# Patient Record
Sex: FEMALE | Race: WHITE | NOT HISPANIC OR LATINO
[De-identification: names, ages, dates, MRNs, and addresses within clinical notes are randomized per-mention and may not be internally consistent; named-entity substitution may affect disease eponyms.]

---

## 2017-01-04 ENCOUNTER — APPOINTMENT (OUTPATIENT)
Dept: INTERNAL MEDICINE | Facility: CLINIC | Age: 82
End: 2017-01-04

## 2017-06-28 ENCOUNTER — APPOINTMENT (OUTPATIENT)
Dept: INTERNAL MEDICINE | Facility: CLINIC | Age: 82
End: 2017-06-28

## 2017-07-19 ENCOUNTER — APPOINTMENT (OUTPATIENT)
Dept: INTERNAL MEDICINE | Facility: CLINIC | Age: 82
End: 2017-07-19

## 2017-08-31 ENCOUNTER — APPOINTMENT (OUTPATIENT)
Dept: INTERNAL MEDICINE | Facility: CLINIC | Age: 82
End: 2017-08-31
Payer: MEDICARE

## 2017-08-31 PROCEDURE — 99215 OFFICE O/P EST HI 40 MIN: CPT

## 2017-09-27 ENCOUNTER — APPOINTMENT (OUTPATIENT)
Dept: INTERNAL MEDICINE | Facility: CLINIC | Age: 82
End: 2017-09-27
Payer: MEDICARE

## 2017-09-27 PROCEDURE — 93000 ELECTROCARDIOGRAM COMPLETE: CPT

## 2017-09-27 PROCEDURE — 99215 OFFICE O/P EST HI 40 MIN: CPT | Mod: 25

## 2017-11-07 ENCOUNTER — APPOINTMENT (OUTPATIENT)
Dept: INTERNAL MEDICINE | Facility: CLINIC | Age: 82
End: 2017-11-07
Payer: MEDICARE

## 2017-11-07 PROCEDURE — 90686 IIV4 VACC NO PRSV 0.5 ML IM: CPT

## 2017-11-07 PROCEDURE — 96372 THER/PROPH/DIAG INJ SC/IM: CPT

## 2017-11-07 PROCEDURE — G0008: CPT

## 2017-11-07 PROCEDURE — 99214 OFFICE O/P EST MOD 30 MIN: CPT | Mod: 25

## 2017-11-15 ENCOUNTER — APPOINTMENT (OUTPATIENT)
Dept: INTERNAL MEDICINE | Facility: CLINIC | Age: 82
End: 2017-11-15
Payer: MEDICARE

## 2017-11-15 PROCEDURE — 99212 OFFICE O/P EST SF 10 MIN: CPT | Mod: 25

## 2017-11-15 PROCEDURE — 96372 THER/PROPH/DIAG INJ SC/IM: CPT

## 2017-11-22 ENCOUNTER — APPOINTMENT (OUTPATIENT)
Dept: INTERNAL MEDICINE | Facility: CLINIC | Age: 82
End: 2017-11-22
Payer: MEDICARE

## 2017-11-22 PROCEDURE — 96372 THER/PROPH/DIAG INJ SC/IM: CPT

## 2017-11-22 PROCEDURE — 99212 OFFICE O/P EST SF 10 MIN: CPT | Mod: 25

## 2017-11-29 ENCOUNTER — APPOINTMENT (OUTPATIENT)
Dept: INTERNAL MEDICINE | Facility: CLINIC | Age: 82
End: 2017-11-29
Payer: MEDICARE

## 2017-11-29 PROCEDURE — 99213 OFFICE O/P EST LOW 20 MIN: CPT | Mod: 25

## 2017-11-29 PROCEDURE — 96372 THER/PROPH/DIAG INJ SC/IM: CPT

## 2018-01-02 ENCOUNTER — APPOINTMENT (OUTPATIENT)
Dept: INTERNAL MEDICINE | Facility: CLINIC | Age: 83
End: 2018-01-02
Payer: MEDICARE

## 2018-01-02 PROCEDURE — 99213 OFFICE O/P EST LOW 20 MIN: CPT | Mod: 25

## 2018-01-02 PROCEDURE — 96372 THER/PROPH/DIAG INJ SC/IM: CPT

## 2018-02-13 ENCOUNTER — APPOINTMENT (OUTPATIENT)
Dept: INTERNAL MEDICINE | Facility: CLINIC | Age: 83
End: 2018-02-13
Payer: MEDICARE

## 2018-02-13 PROCEDURE — 99214 OFFICE O/P EST MOD 30 MIN: CPT | Mod: 25

## 2018-02-13 PROCEDURE — 96372 THER/PROPH/DIAG INJ SC/IM: CPT

## 2018-03-26 ENCOUNTER — APPOINTMENT (OUTPATIENT)
Dept: INTERNAL MEDICINE | Facility: CLINIC | Age: 83
End: 2018-03-26
Payer: MEDICARE

## 2018-03-26 PROCEDURE — 36415 COLL VENOUS BLD VENIPUNCTURE: CPT

## 2018-03-26 PROCEDURE — 96372 THER/PROPH/DIAG INJ SC/IM: CPT

## 2018-03-26 PROCEDURE — 99214 OFFICE O/P EST MOD 30 MIN: CPT | Mod: 25

## 2018-05-11 ENCOUNTER — APPOINTMENT (OUTPATIENT)
Dept: INTERNAL MEDICINE | Facility: CLINIC | Age: 83
End: 2018-05-11
Payer: MEDICARE

## 2018-05-11 PROCEDURE — 96372 THER/PROPH/DIAG INJ SC/IM: CPT

## 2018-06-18 ENCOUNTER — APPOINTMENT (OUTPATIENT)
Dept: INTERNAL MEDICINE | Facility: CLINIC | Age: 83
End: 2018-06-18
Payer: MEDICARE

## 2018-06-18 DIAGNOSIS — E66.9 OBESITY, UNSPECIFIED: ICD-10-CM

## 2018-06-18 DIAGNOSIS — Z82.49 FAMILY HISTORY OF ISCHEMIC HEART DISEASE AND OTHER DISEASES OF THE CIRCULATORY SYSTEM: ICD-10-CM

## 2018-06-18 PROCEDURE — 99213 OFFICE O/P EST LOW 20 MIN: CPT | Mod: 25

## 2018-06-18 PROCEDURE — 96372 THER/PROPH/DIAG INJ SC/IM: CPT

## 2018-07-20 PROBLEM — Z82.49 FAMILY HISTORY OF CARDIAC DISORDER: Status: ACTIVE | Noted: 2018-07-20

## 2018-07-20 PROBLEM — E66.9 OBESITY: Status: ACTIVE | Noted: 2018-07-20

## 2018-08-03 ENCOUNTER — RX RENEWAL (OUTPATIENT)
Age: 83
End: 2018-08-03

## 2018-08-13 ENCOUNTER — APPOINTMENT (OUTPATIENT)
Dept: INTERNAL MEDICINE | Facility: CLINIC | Age: 83
End: 2018-08-13

## 2018-08-20 ENCOUNTER — APPOINTMENT (OUTPATIENT)
Dept: INTERNAL MEDICINE | Facility: CLINIC | Age: 83
End: 2018-08-20
Payer: MEDICARE

## 2018-08-20 VITALS
HEART RATE: 75 BPM | SYSTOLIC BLOOD PRESSURE: 138 MMHG | RESPIRATION RATE: 16 BRPM | DIASTOLIC BLOOD PRESSURE: 76 MMHG | HEIGHT: 65 IN

## 2018-08-20 PROCEDURE — 99214 OFFICE O/P EST MOD 30 MIN: CPT

## 2018-08-20 RX ORDER — ALPRAZOLAM 0.5 MG/1
0.5 TABLET ORAL
Refills: 0 | Status: DISCONTINUED | COMMUNITY
End: 2018-08-20

## 2018-08-20 NOTE — PLAN
[FreeTextEntry1] : Patient's last episode likely secondary to anxiety. In joint discussion with myself, the patient and daughters, it was decided that the best course of Bactrim would be to increase her Zoloft from 50 mg once a day to 75 mg once a day. We will try this for one month patient advised she is having any adverse effects that she would go back to her original dosage of 50 mg once a day.\par \par The rest of patient's blood pressure was within normal limits will continue her current blood pressure medications at this time.\par \par Counseling included abnormal lab results, differential diagnoses, treatment options, risks and benefits, lifestyle changes, prognosis, current condition, medications, and dose adjustments. \par The patient was interactive, attentive, asked questions, and verbalized understanding

## 2018-08-20 NOTE — HISTORY OF PRESENT ILLNESS
[Family Member] : family member [FreeTextEntry8] : Isabella is a 83-year-old female past medical history of high blood pressure, dementia, asthma hyperlipidemia, who comes to the office after going to the hospital on the EKG of August 18 "not feeling right" while shopping with her family. Patient was found to have elevated blood pressures at the hospital with the last blood pressure reading being 180's oer 70's. In the office today patient blood pressure reading was 138/76. Patient started to have bedside and her sodium information the patient is unable to provide. It appears that the patient was under a lot of stress and is experiencing a lot of anxiety at the time of his blood pressure. There is also issue of the patient not compliant with eating well taking her medications for dementia. Patient feels well and has no complaints at this time

## 2018-08-20 NOTE — PHYSICAL EXAM
[No Acute Distress] : no acute distress [Well Nourished] : well nourished [Well Developed] : well developed [Well-Appearing] : well-appearing [No JVD] : no jugular venous distention [Supple] : supple [No Respiratory Distress] : no respiratory distress  [Clear to Auscultation] : lungs were clear to auscultation bilaterally [No Accessory Muscle Use] : no accessory muscle use [Normal Rate] : normal rate  [Regular Rhythm] : with a regular rhythm [Normal S1, S2] : normal S1 and S2 [Soft] : abdomen soft [Non Tender] : non-tender [Non-distended] : non-distended [No CVA Tenderness] : no CVA  tenderness [No Spinal Tenderness] : no spinal tenderness [Coordination Grossly Intact] : coordination grossly intact [No Focal Deficits] : no focal deficits [de-identified] : 6cm x 6cm lipoma noted on anterior neck

## 2018-09-17 ENCOUNTER — APPOINTMENT (OUTPATIENT)
Dept: INTERNAL MEDICINE | Facility: CLINIC | Age: 83
End: 2018-09-17
Payer: MEDICARE

## 2018-09-17 PROCEDURE — 96372 THER/PROPH/DIAG INJ SC/IM: CPT

## 2018-09-17 PROCEDURE — G0008: CPT

## 2018-09-17 PROCEDURE — 99215 OFFICE O/P EST HI 40 MIN: CPT | Mod: 25

## 2018-09-17 PROCEDURE — 90662 IIV NO PRSV INCREASED AG IM: CPT

## 2018-09-17 RX ORDER — CYANOCOBALAMIN 1000 UG/ML
1000 INJECTION INTRAMUSCULAR; SUBCUTANEOUS
Qty: 0 | Refills: 0 | Status: COMPLETED | OUTPATIENT
Start: 2018-09-17

## 2018-09-17 RX ADMIN — CYANOCOBALAMIN 0 MCG/ML: 1000 INJECTION INTRAMUSCULAR; SUBCUTANEOUS at 00:00

## 2018-09-17 NOTE — PLAN
[FreeTextEntry1] : PT HERE WITH HER DAUGHTER \par PT HAS WON DECLINE SINCE I LAST SAW HER IN JUNE PT HAD ER VISIT IN AUGUST FOR SHAKINESS AND BP WAS UP\par PT WITH SLOW BROAD BASED GAIT IS ON ARICEPT AND NAMENDA FROM NEUROLOGIST\par I GAVE HER B12 SHOT TODAY\par HER MEMORY HAS DECLINED \par I AM NOT CONVINCED THE SHAKINESS IS TREMORS MORE LIKELY ANXIETY PT FEEL SIT IS MED RELATED SHE CAN TAPER OFF THE TWO MEDS BUT SUGGEST THAT HER DAUGHTER CONTACT HER NEUROLOGIST  ABOUT THIS ALSO THE GAIT IS CONCERNING BUT DAUGHTER STATES SHE HAS HAD WORK UP FOR THIS\par FLUZONE HIGH DOES GIVEN B12 SHOT GIVEN WILL FOLLOW UP IN ONE MONTH

## 2018-09-17 NOTE — HISTORY OF PRESENT ILLNESS
[FreeTextEntry1] : PT HERE WITH DAUGHTER DIFFICULTY WALKING [de-identified] : PT HERE WITH DAUGHTER SEVERAL ISSUES UNFORTUNATELY AT THE TIME PT WAS SEEN THE COMPUTER EMR SYSTEM WAS DOWN IN THE OFFICE PT DAUGHTER REPORTS ER VISIT TO [facility] AND PT WITH ANXIETY ALSO WITH MEMORY ISSUES AND HAS SEEN NEUROLOGIST IN [place] DR BRITO  PT DAUGHTER CONCERNED ABOUT HOW SHE WALKS

## 2018-09-17 NOTE — PHYSICAL EXAM
[No Acute Distress] : no acute distress [Well Nourished] : well nourished [Well Developed] : well developed [Well-Appearing] : well-appearing [Normal Sclera/Conjunctiva] : normal sclera/conjunctiva [PERRL] : pupils equal round and reactive to light [EOMI] : extraocular movements intact [Normal Outer Ear/Nose] : the outer ears and nose were normal in appearance [Normal Oropharynx] : the oropharynx was normal [No JVD] : no jugular venous distention [Supple] : supple [No Lymphadenopathy] : no lymphadenopathy [Thyroid Normal, No Nodules] : the thyroid was normal and there were no nodules present [No Respiratory Distress] : no respiratory distress  [Clear to Auscultation] : lungs were clear to auscultation bilaterally [No Accessory Muscle Use] : no accessory muscle use [Normal Rate] : normal rate  [Regular Rhythm] : with a regular rhythm [Normal S1, S2] : normal S1 and S2 [No Murmur] : no murmur heard [No Carotid Bruits] : no carotid bruits [No Abdominal Bruit] : a ~M bruit was not heard ~T in the abdomen [No Varicosities] : no varicosities [Pedal Pulses Present] : the pedal pulses are present [No Edema] : there was no peripheral edema [No Extremity Clubbing/Cyanosis] : no extremity clubbing/cyanosis [No Palpable Aorta] : no palpable aorta [Soft] : abdomen soft [Non Tender] : non-tender [Non-distended] : non-distended [No Masses] : no abdominal mass palpated [No HSM] : no HSM [Normal Bowel Sounds] : normal bowel sounds [Normal Posterior Cervical Nodes] : no posterior cervical lymphadenopathy [Normal Anterior Cervical Nodes] : no anterior cervical lymphadenopathy [No CVA Tenderness] : no CVA  tenderness [No Spinal Tenderness] : no spinal tenderness [No Joint Swelling] : no joint swelling [No Rash] : no rash [Normal Affect] : the affect was normal [Normal Insight/Judgement] : insight and judgment were intact [de-identified] : BROAD BASED GAIT SLOW  TREMORS

## 2018-09-17 NOTE — HEALTH RISK ASSESSMENT
[One fall no injury in past year] : Patient reported one fall in the past year without injury [] : No

## 2018-10-11 ENCOUNTER — RX RENEWAL (OUTPATIENT)
Age: 83
End: 2018-10-11

## 2018-10-12 ENCOUNTER — MEDICATION RENEWAL (OUTPATIENT)
Age: 83
End: 2018-10-12

## 2018-10-23 ENCOUNTER — APPOINTMENT (OUTPATIENT)
Dept: INTERNAL MEDICINE | Facility: CLINIC | Age: 83
End: 2018-10-23
Payer: MEDICARE

## 2018-10-23 VITALS
DIASTOLIC BLOOD PRESSURE: 80 MMHG | BODY MASS INDEX: 26.33 KG/M2 | SYSTOLIC BLOOD PRESSURE: 155 MMHG | HEIGHT: 65 IN | WEIGHT: 158 LBS

## 2018-10-23 DIAGNOSIS — J30.9 ALLERGIC RHINITIS, UNSPECIFIED: ICD-10-CM

## 2018-10-23 DIAGNOSIS — I35.0 NONRHEUMATIC AORTIC (VALVE) STENOSIS: ICD-10-CM

## 2018-10-23 PROCEDURE — 99214 OFFICE O/P EST MOD 30 MIN: CPT

## 2018-10-23 NOTE — HISTORY OF PRESENT ILLNESS
[FreeTextEntry1] : Pt Complain of Runny Nose [de-identified] : [facility]\par Pt here with daughter she is concerned about the patients  worsening dementia seen by neurologist  and seeing neuro orthopedic [facility]\par pt now on carbidopa for ? parkinson

## 2018-10-23 NOTE — PHYSICAL EXAM

## 2018-10-23 NOTE — PLAN
[FreeTextEntry1] : Patient is here for followup feels ok but with decline in memory and with tremors\par told daughter follow up with neurology\par overall feels ok\par long d/w daughter

## 2018-10-23 NOTE — PHYSICAL EXAM

## 2019-01-09 ENCOUNTER — APPOINTMENT (OUTPATIENT)
Dept: INTERNAL MEDICINE | Facility: CLINIC | Age: 84
End: 2019-01-09
Payer: MEDICARE

## 2019-01-09 VITALS
SYSTOLIC BLOOD PRESSURE: 121 MMHG | WEIGHT: 158 LBS | HEIGHT: 65 IN | DIASTOLIC BLOOD PRESSURE: 78 MMHG | BODY MASS INDEX: 26.33 KG/M2

## 2019-01-09 PROCEDURE — 99215 OFFICE O/P EST HI 40 MIN: CPT | Mod: 25

## 2019-01-09 PROCEDURE — 36415 COLL VENOUS BLD VENIPUNCTURE: CPT

## 2019-01-09 RX ORDER — TRIAMTERENE AND HYDROCHLOROTHIAZIDE 37.5; 25 MG/1; MG/1
37.5-25 CAPSULE ORAL
Refills: 0 | Status: DISCONTINUED | COMMUNITY
End: 2019-01-09

## 2019-01-09 NOTE — PLAN
[FreeTextEntry1] : Patient is here for followup feels ok but with decline in memory and with tremors\par told daughter follow up with neurology\par who she saw a dr miner and he has a follow up with optho they are concerned about parkinsonism\par overall feels ok\par

## 2019-01-09 NOTE — HISTORY OF PRESENT ILLNESS
[FreeTextEntry1] : meds [de-identified] :  Patient is a 83-year-old patient  who prior entering the exam room gave me a note about his mother\par

## 2019-01-13 LAB
25(OH)D3 SERPL-MCNC: 25.7 NG/ML
ALBUMIN SERPL ELPH-MCNC: 4.7 G/DL
ALP BLD-CCNC: 110 U/L
ALT SERPL-CCNC: 16 U/L
ANION GAP SERPL CALC-SCNC: 12 MMOL/L
AST SERPL-CCNC: 17 U/L
BASOPHILS # BLD AUTO: 0.05 K/UL
BASOPHILS NFR BLD AUTO: 0.7 %
BILIRUB SERPL-MCNC: 0.9 MG/DL
BUN SERPL-MCNC: 23 MG/DL
CALCIUM SERPL-MCNC: 10.5 MG/DL
CHLORIDE SERPL-SCNC: 108 MMOL/L
CHOLEST SERPL-MCNC: 213 MG/DL
CHOLEST/HDLC SERPL: 3.4 RATIO
CO2 SERPL-SCNC: 22 MMOL/L
CREAT SERPL-MCNC: 1.1 MG/DL
EOSINOPHIL # BLD AUTO: 0.06 K/UL
EOSINOPHIL NFR BLD AUTO: 0.9 %
GLUCOSE SERPL-MCNC: 122 MG/DL
HBA1C MFR BLD HPLC: 5.3 %
HCT VFR BLD CALC: 43.6 %
HDLC SERPL-MCNC: 63 MG/DL
HGB BLD-MCNC: 14.5 G/DL
IMM GRANULOCYTES NFR BLD AUTO: 0.1 %
LDLC SERPL CALC-MCNC: 110 MG/DL
LYMPHOCYTES # BLD AUTO: 1.72 K/UL
LYMPHOCYTES NFR BLD AUTO: 25.3 %
MAN DIFF?: NORMAL
MCHC RBC-ENTMCNC: 29.2 PG
MCHC RBC-ENTMCNC: 33.3 GM/DL
MCV RBC AUTO: 87.7 FL
MONOCYTES # BLD AUTO: 0.67 K/UL
MONOCYTES NFR BLD AUTO: 9.9 %
NEUTROPHILS # BLD AUTO: 4.28 K/UL
NEUTROPHILS NFR BLD AUTO: 63.1 %
PLATELET # BLD AUTO: 168 K/UL
POTASSIUM SERPL-SCNC: 4.2 MMOL/L
PROT SERPL-MCNC: 6.7 G/DL
RBC # BLD: 4.97 M/UL
RBC # FLD: 13.7 %
SODIUM SERPL-SCNC: 142 MMOL/L
T4 SERPL-MCNC: 5.2 UG/DL
TRIGL SERPL-MCNC: 199 MG/DL
TSH SERPL-ACNC: 3.58 UIU/ML
VIT B12 SERPL-MCNC: 666 PG/ML
WBC # FLD AUTO: 6.79 K/UL

## 2019-03-13 ENCOUNTER — RX RENEWAL (OUTPATIENT)
Age: 84
End: 2019-03-13

## 2019-03-13 ENCOUNTER — APPOINTMENT (OUTPATIENT)
Dept: INTERNAL MEDICINE | Facility: CLINIC | Age: 84
End: 2019-03-13
Payer: MEDICARE

## 2019-03-13 VITALS
SYSTOLIC BLOOD PRESSURE: 140 MMHG | WEIGHT: 154.4 LBS | DIASTOLIC BLOOD PRESSURE: 70 MMHG | BODY MASS INDEX: 25.72 KG/M2 | HEIGHT: 65 IN

## 2019-03-13 PROCEDURE — 99214 OFFICE O/P EST MOD 30 MIN: CPT

## 2019-03-14 NOTE — HISTORY OF PRESENT ILLNESS
[FreeTextEntry1] : F/U  on  B/P [de-identified] :  Patient is a 83-year-old patient  who IS  HERE WITH DAUGHTER  \par HAS SEEN NERUOLOGY PT WITH COGNITVE CHANGES MEMORY LOSS PT WITH BACK PAIN SPINAL STENOSIS\par

## 2019-03-14 NOTE — PLAN
[FreeTextEntry1] : Patient is here for followup feels ok but with decline in memory and with tremors\par tPT HAS FOLLOWED WITH  NEUROLOGY ON ARICEPT AND MEMANTINE \par who she saw a dr miner and he has a follow up with optho they are concerned about parkinsonism\par AND ON LEVODOPA CABIDOPA \par LONG D/W DAUGHTER  AND PT ENCOURAGE DHER TO MOVE TO INDEPENDENT LIVING  AS WOULD BE BETTER  MENTALLY \par

## 2019-03-15 ENCOUNTER — RX RENEWAL (OUTPATIENT)
Age: 84
End: 2019-03-15

## 2019-04-24 ENCOUNTER — APPOINTMENT (OUTPATIENT)
Dept: INTERNAL MEDICINE | Facility: CLINIC | Age: 84
End: 2019-04-24
Payer: MEDICARE

## 2019-04-24 VITALS
HEIGHT: 65 IN | DIASTOLIC BLOOD PRESSURE: 70 MMHG | SYSTOLIC BLOOD PRESSURE: 130 MMHG | WEIGHT: 153 LBS | BODY MASS INDEX: 25.49 KG/M2

## 2019-04-24 PROCEDURE — 99358 PROLONG SERVICE W/O CONTACT: CPT

## 2019-04-24 PROCEDURE — 99214 OFFICE O/P EST MOD 30 MIN: CPT | Mod: 25

## 2019-04-24 NOTE — PLAN
[FreeTextEntry1] : PT HERE FOR FOLLOW UP WITH DAUGHTER \par  PT WITH FALL AT HOME  WITH LARGE  ECCYMOSIS OVER  FACE\par PT TOLD  I BELIEVE SHE NEEDS ASSISTANCE AT HOME AND THAT WHE WAS  SALLY THIS TIME \par SHE SEEMS AGREEABLE \par WILL FOLLOW UP \par SHE DOES HABVE A MED ALERT [facility]\par WILL FOLLOW UP\par EXTENSIVE REVIEW OF HOSPITAL RECORDS\par DURATION OF REVIEW 35 MINUTES

## 2019-04-24 NOTE — PHYSICAL EXAM
[No Acute Distress] : no acute distress [Well Nourished] : well nourished [Well Developed] : well developed [Well-Appearing] : well-appearing [PERRL] : pupils equal round and reactive to light [EOMI] : extraocular movements intact [Normal Outer Ear/Nose] : the outer ears and nose were normal in appearance [Normal Oropharynx] : the oropharynx was normal [No JVD] : no jugular venous distention [Supple] : supple [No Lymphadenopathy] : no lymphadenopathy [Thyroid Normal, No Nodules] : the thyroid was normal and there were no nodules present [No Respiratory Distress] : no respiratory distress  [Clear to Auscultation] : lungs were clear to auscultation bilaterally [No Accessory Muscle Use] : no accessory muscle use [Normal Rate] : normal rate  [Regular Rhythm] : with a regular rhythm [Normal S1, S2] : normal S1 and S2 [No Murmur] : no murmur heard [No Carotid Bruits] : no carotid bruits [No Abdominal Bruit] : a ~M bruit was not heard ~T in the abdomen [No Varicosities] : no varicosities [Pedal Pulses Present] : the pedal pulses are present [No Edema] : there was no peripheral edema [No Extremity Clubbing/Cyanosis] : no extremity clubbing/cyanosis [No Palpable Aorta] : no palpable aorta [Soft] : abdomen soft [Non Tender] : non-tender [Non-distended] : non-distended [No Masses] : no abdominal mass palpated [No HSM] : no HSM [Normal Bowel Sounds] : normal bowel sounds [Normal Posterior Cervical Nodes] : no posterior cervical lymphadenopathy [Normal Anterior Cervical Nodes] : no anterior cervical lymphadenopathy [No CVA Tenderness] : no CVA  tenderness [No Spinal Tenderness] : no spinal tenderness [No Joint Swelling] : no joint swelling [Grossly Normal Strength/Tone] : grossly normal strength/tone [No Rash] : no rash [Normal Gait] : normal gait [Coordination Grossly Intact] : coordination grossly intact [No Focal Deficits] : no focal deficits [Deep Tendon Reflexes (DTR)] : deep tendon reflexes were 2+ and symmetric [Normal Affect] : the affect was normal [Normal Insight/Judgement] : insight and judgment were intact [de-identified] : ECCYMOSIS OVER ENTIRE FACE

## 2019-04-24 NOTE — HISTORY OF PRESENT ILLNESS
[FreeTextEntry8] : F/U  on  ER  Visit \par PT WITH RECENT FALL AT HOME WENT TO [facility] AND HAS LARGE AREA OF ECYMOSIS\par DAUGHTER WANTS PT TO HAVE HOME ATTENDANT BUT SO FAR PT HAS REFUSED \par

## 2019-04-24 NOTE — REVIEW OF SYSTEMS
[Negative] : Heme/Lymph [Confusion] : confusion [Memory Loss] : memory loss [Unsteady Walking] : ataxia

## 2019-04-30 ENCOUNTER — RX RENEWAL (OUTPATIENT)
Age: 84
End: 2019-04-30

## 2019-06-21 ENCOUNTER — APPOINTMENT (OUTPATIENT)
Dept: INTERNAL MEDICINE | Facility: CLINIC | Age: 84
End: 2019-06-21

## 2019-08-29 ENCOUNTER — APPOINTMENT (OUTPATIENT)
Dept: INTERNAL MEDICINE | Facility: CLINIC | Age: 84
End: 2019-08-29
Payer: MEDICARE

## 2019-08-29 VITALS
BODY MASS INDEX: 25.49 KG/M2 | WEIGHT: 153 LBS | DIASTOLIC BLOOD PRESSURE: 60 MMHG | SYSTOLIC BLOOD PRESSURE: 118 MMHG | HEIGHT: 65 IN

## 2019-08-29 DIAGNOSIS — F32.9 MAJOR DEPRESSIVE DISORDER, SINGLE EPISODE, UNSPECIFIED: ICD-10-CM

## 2019-08-29 DIAGNOSIS — Z00.00 ENCOUNTER FOR GENERAL ADULT MEDICAL EXAMINATION W/OUT ABNORMAL FINDINGS: ICD-10-CM

## 2019-08-29 DIAGNOSIS — R01.1 CARDIAC MURMUR, UNSPECIFIED: ICD-10-CM

## 2019-08-29 DIAGNOSIS — E78.00 PURE HYPERCHOLESTEROLEMIA, UNSPECIFIED: ICD-10-CM

## 2019-08-29 DIAGNOSIS — E55.9 VITAMIN D DEFICIENCY, UNSPECIFIED: ICD-10-CM

## 2019-08-29 DIAGNOSIS — M16.11 UNILATERAL PRIMARY OSTEOARTHRITIS, RIGHT HIP: ICD-10-CM
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
## 2019-08-29 DIAGNOSIS — R73.03 PREDIABETES.: ICD-10-CM

## 2019-08-29 DIAGNOSIS — Z09 ENCOUNTER FOR FOLLOW-UP EXAMINATION AFTER COMPLETED TREATMENT FOR CONDITIONS OTHER THAN MALIGNANT NEOPLASM: ICD-10-CM

## 2019-08-29 DIAGNOSIS — E53.8 DEFICIENCY OF OTHER SPECIFIED B GROUP VITAMINS: ICD-10-CM

## 2019-08-29 DIAGNOSIS — R19.7 DIARRHEA, UNSPECIFIED: ICD-10-CM

## 2019-08-29 PROCEDURE — 36415 COLL VENOUS BLD VENIPUNCTURE: CPT

## 2019-08-29 PROCEDURE — 99214 OFFICE O/P EST MOD 30 MIN: CPT | Mod: 25

## 2019-08-29 NOTE — ASSESSMENT
[FreeTextEntry1] : 83 YO MALE WITH HX OF DEMENITA PARKINSONISM HTN HERE FOR FOLLOWUP WITH DAUGHTER HAS HAD SOME FALLS  IS GOING TO PT\par WILL CHECK LABS DRAWN HERE\par CONTINUE PRESENT REGIMEN

## 2019-08-29 NOTE — HISTORY OF PRESENT ILLNESS
[FreeTextEntry1] : Follow Up On B/P  &  Other Medical Problems [de-identified] : PT HERE FOR FOLLOW UP FEEL DANIS HAS HAD SOME FALLS AT HOME SOINCE LAST VISIT AND HOSP ER VISIT ON JUNE 21 [facility] RECEIVING PT AT [facility]\par PT WITH WORSENING DEMENTIA AS   PER DAUGHTER

## 2019-08-29 NOTE — PHYSICAL EXAM
[Well Nourished] : well nourished [No Acute Distress] : no acute distress [Well-Appearing] : well-appearing [Well Developed] : well developed [Normal Sclera/Conjunctiva] : normal sclera/conjunctiva [PERRL] : pupils equal round and reactive to light [EOMI] : extraocular movements intact [Normal Outer Ear/Nose] : the outer ears and nose were normal in appearance [Normal Oropharynx] : the oropharynx was normal [No JVD] : no jugular venous distention [No Lymphadenopathy] : no lymphadenopathy [Thyroid Normal, No Nodules] : the thyroid was normal and there were no nodules present [Supple] : supple [No Accessory Muscle Use] : no accessory muscle use [No Respiratory Distress] : no respiratory distress  [Normal Rate] : normal rate  [Clear to Auscultation] : lungs were clear to auscultation bilaterally [Regular Rhythm] : with a regular rhythm [Normal S1, S2] : normal S1 and S2 [No Murmur] : no murmur heard [No Abdominal Bruit] : a ~M bruit was not heard ~T in the abdomen [No Carotid Bruits] : no carotid bruits [Pedal Pulses Present] : the pedal pulses are present [No Varicosities] : no varicosities [No Edema] : there was no peripheral edema [No Palpable Aorta] : no palpable aorta [Soft] : abdomen soft [No Extremity Clubbing/Cyanosis] : no extremity clubbing/cyanosis [Non Tender] : non-tender [No Masses] : no abdominal mass palpated [Non-distended] : non-distended [No HSM] : no HSM [Normal Bowel Sounds] : normal bowel sounds [Normal Posterior Cervical Nodes] : no posterior cervical lymphadenopathy [Normal Anterior Cervical Nodes] : no anterior cervical lymphadenopathy [No CVA Tenderness] : no CVA  tenderness [No Joint Swelling] : no joint swelling [No Spinal Tenderness] : no spinal tenderness [Grossly Normal Strength/Tone] : grossly normal strength/tone [No Rash] : no rash [Normal] : normal gait, coordination grossly intact, no focal deficits and deep tendon reflexes were 2+ and symmetric [Normal Affect] : the affect was normal [Normal Insight/Judgement] : insight and judgment were intact

## 2019-09-25 ENCOUNTER — RX RENEWAL (OUTPATIENT)
Age: 84
End: 2019-09-25

## 2019-09-29 LAB
25(OH)D3 SERPL-MCNC: 50.1 NG/ML
ALBUMIN SERPL ELPH-MCNC: 4.5 G/DL
ALP BLD-CCNC: 105 U/L
ALT SERPL-CCNC: 7 U/L
ANION GAP SERPL CALC-SCNC: 13 MMOL/L
AST SERPL-CCNC: 17 U/L
BASOPHILS # BLD AUTO: 0.07 K/UL
BASOPHILS NFR BLD AUTO: 1.1 %
BILIRUB SERPL-MCNC: 1 MG/DL
BUN SERPL-MCNC: 29 MG/DL
CALCIUM SERPL-MCNC: 10.5 MG/DL
CHLORIDE SERPL-SCNC: 101 MMOL/L
CHOLEST SERPL-MCNC: 185 MG/DL
CHOLEST/HDLC SERPL: 3.6 RATIO
CO2 SERPL-SCNC: 26 MMOL/L
CREAT SERPL-MCNC: 1.07 MG/DL
EOSINOPHIL # BLD AUTO: 0.03 K/UL
EOSINOPHIL NFR BLD AUTO: 0.5 %
ESTIMATED AVERAGE GLUCOSE: 91 MG/DL
GLUCOSE SERPL-MCNC: 123 MG/DL
HBA1C MFR BLD HPLC: 4.8 %
HCT VFR BLD CALC: 43.1 %
HDLC SERPL-MCNC: 52 MG/DL
HGB BLD-MCNC: 14 G/DL
IMM GRANULOCYTES NFR BLD AUTO: 0.2 %
LDLC SERPL CALC-MCNC: 109 MG/DL
LYMPHOCYTES # BLD AUTO: 1.57 K/UL
LYMPHOCYTES NFR BLD AUTO: 25.4 %
MAN DIFF?: NORMAL
MCHC RBC-ENTMCNC: 29.6 PG
MCHC RBC-ENTMCNC: 32.5 GM/DL
MCV RBC AUTO: 91.1 FL
MONOCYTES # BLD AUTO: 0.51 K/UL
MONOCYTES NFR BLD AUTO: 8.3 %
NEUTROPHILS # BLD AUTO: 3.98 K/UL
NEUTROPHILS NFR BLD AUTO: 64.5 %
PLATELET # BLD AUTO: 148 K/UL
POTASSIUM SERPL-SCNC: 4.5 MMOL/L
PROT SERPL-MCNC: 6.7 G/DL
RBC # BLD: 4.73 M/UL
RBC # FLD: 13.4 %
SODIUM SERPL-SCNC: 140 MMOL/L
T4 SERPL-MCNC: 5.4 UG/DL
TRIGL SERPL-MCNC: 118 MG/DL
TSH SERPL-ACNC: 1.71 UIU/ML
WBC # FLD AUTO: 6.17 K/UL

## 2019-10-23 ENCOUNTER — APPOINTMENT (OUTPATIENT)
Dept: INTERNAL MEDICINE | Facility: CLINIC | Age: 84
End: 2019-10-23

## 2020-01-09 ENCOUNTER — RX RENEWAL (OUTPATIENT)
Age: 85
End: 2020-01-09

## 2020-06-03 ENCOUNTER — APPOINTMENT (OUTPATIENT)
Dept: INTERNAL MEDICINE | Facility: CLINIC | Age: 85
End: 2020-06-03
Payer: MEDICARE

## 2020-06-03 VITALS
BODY MASS INDEX: 27.32 KG/M2 | DIASTOLIC BLOOD PRESSURE: 50 MMHG | WEIGHT: 164 LBS | HEIGHT: 65 IN | TEMPERATURE: 98 F | SYSTOLIC BLOOD PRESSURE: 150 MMHG

## 2020-06-03 DIAGNOSIS — E78.5 HYPERLIPIDEMIA, UNSPECIFIED: ICD-10-CM

## 2020-06-03 DIAGNOSIS — F41.9 ANXIETY DISORDER, UNSPECIFIED: ICD-10-CM

## 2020-06-03 DIAGNOSIS — I35.0 NONRHEUMATIC AORTIC (VALVE) STENOSIS: ICD-10-CM

## 2020-06-03 DIAGNOSIS — I10 ESSENTIAL (PRIMARY) HYPERTENSION: ICD-10-CM

## 2020-06-03 DIAGNOSIS — F03.90 UNSPECIFIED DEMENTIA W/OUT BEHAVIORAL DISTURBANCE: ICD-10-CM

## 2020-06-03 DIAGNOSIS — G20 PARKINSON'S DISEASE: ICD-10-CM

## 2020-06-03 DIAGNOSIS — R26.89 OTHER ABNORMALITIES OF GAIT AND MOBILITY: ICD-10-CM

## 2020-06-03 PROCEDURE — 99358 PROLONG SERVICE W/O CONTACT: CPT

## 2020-06-03 PROCEDURE — 99214 OFFICE O/P EST MOD 30 MIN: CPT | Mod: 25

## 2020-06-03 RX ORDER — CYANOCOBALAMIN 1000 UG/ML
1000 INJECTION INTRAMUSCULAR; SUBCUTANEOUS
Refills: 0 | Status: DISCONTINUED | COMMUNITY
End: 2020-06-03

## 2020-06-03 RX ORDER — METOPROLOL SUCCINATE 100 MG/1
100 TABLET, EXTENDED RELEASE ORAL
Qty: 90 | Refills: 2 | Status: DISCONTINUED | COMMUNITY
Start: 2018-10-11 | End: 2020-06-03

## 2020-06-03 RX ORDER — IPRATROPIUM BROMIDE 21 UG/1
0.03 SPRAY NASAL TWICE DAILY
Qty: 1 | Refills: 3 | Status: DISCONTINUED | COMMUNITY
Start: 2018-10-23 | End: 2020-06-03

## 2020-06-03 NOTE — PHYSICAL EXAM
[No Acute Distress] : no acute distress [Well-Appearing] : well-appearing [Well Nourished] : well nourished [Well Developed] : well developed [EOMI] : extraocular movements intact [Normal Sclera/Conjunctiva] : normal sclera/conjunctiva [PERRL] : pupils equal round and reactive to light [No JVD] : no jugular venous distention [Normal Outer Ear/Nose] : the outer ears and nose were normal in appearance [Normal Oropharynx] : the oropharynx was normal [Supple] : supple [No Lymphadenopathy] : no lymphadenopathy [Thyroid Normal, No Nodules] : the thyroid was normal and there were no nodules present [No Respiratory Distress] : no respiratory distress  [No Accessory Muscle Use] : no accessory muscle use [Normal Rate] : normal rate  [Clear to Auscultation] : lungs were clear to auscultation bilaterally [Regular Rhythm] : with a regular rhythm [No Carotid Bruits] : no carotid bruits [No Murmur] : no murmur heard [Normal S1, S2] : normal S1 and S2 [Pedal Pulses Present] : the pedal pulses are present [No Abdominal Bruit] : a ~M bruit was not heard ~T in the abdomen [No Varicosities] : no varicosities [No Palpable Aorta] : no palpable aorta [No Edema] : there was no peripheral edema [No Extremity Clubbing/Cyanosis] : no extremity clubbing/cyanosis [Non-distended] : non-distended [Soft] : abdomen soft [Non Tender] : non-tender [No HSM] : no HSM [No Masses] : no abdominal mass palpated [Normal Bowel Sounds] : normal bowel sounds [Normal Posterior Cervical Nodes] : no posterior cervical lymphadenopathy [No Spinal Tenderness] : no spinal tenderness [Normal Anterior Cervical Nodes] : no anterior cervical lymphadenopathy [No CVA Tenderness] : no CVA  tenderness [No Joint Swelling] : no joint swelling [Grossly Normal Strength/Tone] : grossly normal strength/tone [Normal] : normal gait, coordination grossly intact, no focal deficits and deep tendon reflexes were 2+ and symmetric [Normal Affect] : the affect was normal [Normal Insight/Judgement] : insight and judgment were intact [de-identified] : RIGHT GROIN/ THIGH HEMATOMA

## 2020-06-03 NOTE — HISTORY OF PRESENT ILLNESS
[FreeTextEntry1] : hosp f/up [de-identified] : PT HERE WITH TWO DAUGHTERS WAS HOSPITALIZED AT [facility] HAD FEVER AND CHILLS AND HAD UTI AND FOUND TO HAVE AORTIC STENOSIS  HAD CARDIAC CATH AND THERE WAS CONSIDERATION FOR TAVR ALSO WITH \par   REPORTED CLOT IN ILIAC ARTERY \par  SEEN BY VACULAR DR RASMUSSEN NO BLOOD  THINNER \par  NOW OFF METOPROLOL\par  PT WITH DEMENTIA

## 2020-06-03 NOTE — PLAN
[FreeTextEntry1] : PT HERE WITH TWO DAUGHTERS WAS HOSPITALIZED AT [facility] HAD FEVER AND CHILLS AND HAD UTI AND FOUND TO HAVE AORTIC STENOSIS  HAD CARDIAC CATH AND THERE WAS CONSIDERATION FOR TAVR ALSO WITH \par   REPORTED CLOT IN ILIAC ARTERY \par  SEEN BY VACULAR DR RASMUSSEN NO BLOOD  THINNER \par  NOW OFF METOPROLOL\par  PT WITH DEMENTIA   ANSWERS QUESTIONS BUT CONFUSED\par WILL FOLLOW UP WITH DR RASMUSSEN FOR VASC\par HAS APPT WITH DR GARCIA CARDIOLOGIST TO D/W FAMILY TAVR\par \par  EXTENSIVE REVIEW OF HOSP RECORDS INCLING RADIOLOGY RPROTS LABS CONSULTS\par DURATION OF REVIEW 35 MINUTES

## 2020-08-29 ENCOUNTER — RX RENEWAL (OUTPATIENT)
Age: 85
End: 2020-08-29

## 2020-10-02 ENCOUNTER — INPATIENT (INPATIENT)
Facility: HOSPITAL | Age: 85
LOS: 6 days | Discharge: NURSING, REHAB OR LONG-TERM CARE | DRG: 522 | End: 2020-10-09
Attending: HOSPITALIST | Admitting: SURGERY
Payer: MEDICARE

## 2020-10-02 VITALS
SYSTOLIC BLOOD PRESSURE: 143 MMHG | TEMPERATURE: 98 F | HEART RATE: 65 BPM | HEIGHT: 66 IN | RESPIRATION RATE: 18 BRPM | WEIGHT: 149.91 LBS | OXYGEN SATURATION: 94 % | DIASTOLIC BLOOD PRESSURE: 64 MMHG

## 2020-10-02 LAB
ANION GAP SERPL CALC-SCNC: 11 MMOL/L — SIGNIFICANT CHANGE UP (ref 5–17)
APTT BLD: 29.1 SEC — SIGNIFICANT CHANGE UP (ref 27.5–35.5)
BASOPHILS # BLD AUTO: 0.04 K/UL — SIGNIFICANT CHANGE UP (ref 0–0.2)
BASOPHILS NFR BLD AUTO: 0.4 % — SIGNIFICANT CHANGE UP (ref 0–2)
BLD GP AB SCN SERPL QL: SIGNIFICANT CHANGE UP
BUN SERPL-MCNC: 22 MG/DL — HIGH (ref 8–20)
CALCIUM SERPL-MCNC: 10 MG/DL — SIGNIFICANT CHANGE UP (ref 8.6–10.2)
CHLORIDE SERPL-SCNC: 104 MMOL/L — SIGNIFICANT CHANGE UP (ref 98–107)
CO2 SERPL-SCNC: 23 MMOL/L — SIGNIFICANT CHANGE UP (ref 22–29)
CREAT SERPL-MCNC: 0.91 MG/DL — SIGNIFICANT CHANGE UP (ref 0.5–1.3)
EOSINOPHIL # BLD AUTO: 0.08 K/UL — SIGNIFICANT CHANGE UP (ref 0–0.5)
EOSINOPHIL NFR BLD AUTO: 0.9 % — SIGNIFICANT CHANGE UP (ref 0–6)
GLUCOSE SERPL-MCNC: 130 MG/DL — HIGH (ref 70–99)
HCT VFR BLD CALC: 39.5 % — SIGNIFICANT CHANGE UP (ref 34.5–45)
HGB BLD-MCNC: 13.5 G/DL — SIGNIFICANT CHANGE UP (ref 11.5–15.5)
IMM GRANULOCYTES NFR BLD AUTO: 0.3 % — SIGNIFICANT CHANGE UP (ref 0–1.5)
INR BLD: 1.15 RATIO — SIGNIFICANT CHANGE UP (ref 0.88–1.16)
LYMPHOCYTES # BLD AUTO: 0.67 K/UL — LOW (ref 1–3.3)
LYMPHOCYTES # BLD AUTO: 7.3 % — LOW (ref 13–44)
MCHC RBC-ENTMCNC: 29.9 PG — SIGNIFICANT CHANGE UP (ref 27–34)
MCHC RBC-ENTMCNC: 34.2 GM/DL — SIGNIFICANT CHANGE UP (ref 32–36)
MCV RBC AUTO: 87.6 FL — SIGNIFICANT CHANGE UP (ref 80–100)
MONOCYTES # BLD AUTO: 0.6 K/UL — SIGNIFICANT CHANGE UP (ref 0–0.9)
MONOCYTES NFR BLD AUTO: 6.5 % — SIGNIFICANT CHANGE UP (ref 2–14)
NEUTROPHILS # BLD AUTO: 7.76 K/UL — HIGH (ref 1.8–7.4)
NEUTROPHILS NFR BLD AUTO: 84.6 % — HIGH (ref 43–77)
PLATELET # BLD AUTO: 106 K/UL — LOW (ref 150–400)
POTASSIUM SERPL-MCNC: 4 MMOL/L — SIGNIFICANT CHANGE UP (ref 3.5–5.3)
POTASSIUM SERPL-SCNC: 4 MMOL/L — SIGNIFICANT CHANGE UP (ref 3.5–5.3)
PROTHROM AB SERPL-ACNC: 13.2 SEC — SIGNIFICANT CHANGE UP (ref 10.6–13.6)
RBC # BLD: 4.51 M/UL — SIGNIFICANT CHANGE UP (ref 3.8–5.2)
RBC # FLD: 13.1 % — SIGNIFICANT CHANGE UP (ref 10.3–14.5)
SODIUM SERPL-SCNC: 138 MMOL/L — SIGNIFICANT CHANGE UP (ref 135–145)
WBC # BLD: 9.18 K/UL — SIGNIFICANT CHANGE UP (ref 3.8–10.5)
WBC # FLD AUTO: 9.18 K/UL — SIGNIFICANT CHANGE UP (ref 3.8–10.5)

## 2020-10-02 PROCEDURE — 71045 X-RAY EXAM CHEST 1 VIEW: CPT | Mod: 26

## 2020-10-02 PROCEDURE — 73552 X-RAY EXAM OF FEMUR 2/>: CPT | Mod: 26,RT

## 2020-10-02 PROCEDURE — 76377 3D RENDER W/INTRP POSTPROCES: CPT | Mod: 26

## 2020-10-02 PROCEDURE — 99285 EMERGENCY DEPT VISIT HI MDM: CPT | Mod: CS

## 2020-10-02 PROCEDURE — 72192 CT PELVIS W/O DYE: CPT | Mod: 26

## 2020-10-02 PROCEDURE — 73502 X-RAY EXAM HIP UNI 2-3 VIEWS: CPT | Mod: 26,RT

## 2020-10-02 RX ORDER — CARBIDOPA AND LEVODOPA 25; 100 MG/1; MG/1
1 TABLET ORAL THREE TIMES A DAY
Refills: 0 | Status: DISCONTINUED | OUTPATIENT
Start: 2020-10-02 | End: 2020-10-07

## 2020-10-02 RX ORDER — ATORVASTATIN CALCIUM 80 MG/1
20 TABLET, FILM COATED ORAL AT BEDTIME
Refills: 0 | Status: DISCONTINUED | OUTPATIENT
Start: 2020-10-02 | End: 2020-10-07

## 2020-10-02 RX ORDER — AMLODIPINE BESYLATE 2.5 MG/1
5 TABLET ORAL DAILY
Refills: 0 | Status: DISCONTINUED | OUTPATIENT
Start: 2020-10-02 | End: 2020-10-07

## 2020-10-02 RX ORDER — SERTRALINE 25 MG/1
100 TABLET, FILM COATED ORAL DAILY
Refills: 0 | Status: DISCONTINUED | OUTPATIENT
Start: 2020-10-02 | End: 2020-10-07

## 2020-10-02 RX ORDER — DONEPEZIL HYDROCHLORIDE 10 MG/1
5 TABLET, FILM COATED ORAL DAILY
Refills: 0 | Status: DISCONTINUED | OUTPATIENT
Start: 2020-10-02 | End: 2020-10-07

## 2020-10-02 RX ORDER — DONEPEZIL HYDROCHLORIDE 10 MG/1
5 TABLET, FILM COATED ORAL AT BEDTIME
Refills: 0 | Status: DISCONTINUED | OUTPATIENT
Start: 2020-10-02 | End: 2020-10-07

## 2020-10-02 RX ORDER — ASPIRIN/CALCIUM CARB/MAGNESIUM 324 MG
81 TABLET ORAL AT BEDTIME
Refills: 0 | Status: DISCONTINUED | OUTPATIENT
Start: 2020-10-02 | End: 2020-10-02

## 2020-10-02 RX ORDER — MEMANTINE HYDROCHLORIDE 10 MG/1
10 TABLET ORAL
Refills: 0 | Status: DISCONTINUED | OUTPATIENT
Start: 2020-10-02 | End: 2020-10-07

## 2020-10-02 RX ORDER — CHOLECALCIFEROL (VITAMIN D3) 125 MCG
2000 CAPSULE ORAL
Refills: 0 | Status: DISCONTINUED | OUTPATIENT
Start: 2020-10-02 | End: 2020-10-07

## 2020-10-02 RX ORDER — CEFAZOLIN SODIUM 1 G
2000 VIAL (EA) INJECTION ONCE
Refills: 0 | Status: COMPLETED | OUTPATIENT
Start: 2020-10-02 | End: 2020-10-02

## 2020-10-02 RX ORDER — ACETAMINOPHEN 500 MG
650 TABLET ORAL ONCE
Refills: 0 | Status: COMPLETED | OUTPATIENT
Start: 2020-10-02 | End: 2020-10-02

## 2020-10-02 RX ORDER — LOSARTAN POTASSIUM 100 MG/1
50 TABLET, FILM COATED ORAL DAILY
Refills: 0 | Status: DISCONTINUED | OUTPATIENT
Start: 2020-10-02 | End: 2020-10-07

## 2020-10-02 RX ADMIN — ATORVASTATIN CALCIUM 20 MILLIGRAM(S): 80 TABLET, FILM COATED ORAL at 21:58

## 2020-10-02 RX ADMIN — CARBIDOPA AND LEVODOPA 1 TABLET(S): 25; 100 TABLET ORAL at 21:58

## 2020-10-02 RX ADMIN — DONEPEZIL HYDROCHLORIDE 5 MILLIGRAM(S): 10 TABLET, FILM COATED ORAL at 19:53

## 2020-10-02 RX ADMIN — Medication 81 MILLIGRAM(S): at 19:53

## 2020-10-02 RX ADMIN — Medication 650 MILLIGRAM(S): at 21:58

## 2020-10-02 RX ADMIN — Medication 2000 UNIT(S): at 21:58

## 2020-10-02 RX ADMIN — MEMANTINE HYDROCHLORIDE 10 MILLIGRAM(S): 10 TABLET ORAL at 19:53

## 2020-10-02 NOTE — PROGRESS NOTE ADULT - ASSESSMENT
84 y/o female sustained right femoral neck fracture s/p mechanical fall. Patient denies LOC. Patient with Alzheimers dementia, history obtained by daughter at bedside. Pmh parkinsonism and critical Aortic Stenosis. Patient has remote history of anesthesia and denies any complications. Prior to Or will require cardiac evaluation/clearance and echocardiogram. Spinal is contraindicated in this patient and will require pre induction arterial line, +/- central access, and +/- intraoperative GALA.

## 2020-10-02 NOTE — ED PROVIDER NOTE - OBJECTIVE STATEMENT
86 yo female with pain r hip and femur s/p fall yesterday. No head trauma loc nv visual change  got up and went into bed, can stand today but has pain with ambulation  no other complaints. No peripheral paresthesias no lacs  Med hx

## 2020-10-02 NOTE — ED PROVIDER NOTE - CONSTITUTIONAL, MLM
normal... Well appearing, awake, alert, oriented to person,  time/situation and in no apparent distress.

## 2020-10-02 NOTE — ED PROVIDER NOTE - SHIFT CHANGE DETAILS
Put in dispo  awaiting to hear from trauma if they are admitting or if she should be admitted to medicine

## 2020-10-02 NOTE — ED ADULT NURSE NOTE - OBJECTIVE STATEMENT
Pt biba from home, pt is alert and oriented to person only (baseline), daughter at bedside.  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  As per daughter, pt fell last night, was unable to walk after fall.  Pt usually walks with a walker.  Daughter denies any head injury.  Pt is now c/o severe pain to left hip area.  Shortening noted of right leg.  Pt able to move all toes and ankle of RLE.  Abd soft nondistended, nontender.

## 2020-10-02 NOTE — ED ADULT TRIAGE NOTE - CHIEF COMPLAINT QUOTE
pt arrive by ambulance from home with reports of right leg pain after a mechanical fall 4 days ago. pt noted to be confused, as per EMS they do not know her baseline mental status.

## 2020-10-02 NOTE — CONSULT NOTE ADULT - SUBJECTIVE AND OBJECTIVE BOX
[facility], THE HEART CENTER                [address] [place] [address]                                     PHONE: [phone]                                       FAX: [phone]  [email or web address]      Reason for Consult:    HPI:      PAST MEDICAL & SURGICAL HISTORY:  Falls frequently    Parkinsonism, unspecified Parkinsonism type    Depression, unspecified depression type    Alzheimer&#x27;s dementia without behavioral disturbance, unspecified timing of dementia onset    Cholesterolosis    No significant past surgical history        Telemetry:     PREVIOUS DIAGNOSTIC TESTING:      EKG:     ECHO FINDINGS:    STRESS FINDINGS:    CATHETERIZATION FINDINGS:    MEDICATIONS  (STANDING):  amLODIPine   Tablet 5 milliGRAM(s) Oral daily  aspirin enteric coated 81 milliGRAM(s) Oral at bedtime  atorvastatin 20 milliGRAM(s) Oral at bedtime  carbidopa/levodopa  25/100 1 Tablet(s) Oral three times a day  cholecalciferol 2000 Unit(s) Oral every 48 hours  donepezil 5 milliGRAM(s) Oral daily  donepezil 5 milliGRAM(s) Oral at bedtime  losartan 50 milliGRAM(s) Oral daily  memantine 10 milliGRAM(s) Oral two times a day  sertraline 100 milliGRAM(s) Oral daily    MEDICATIONS  (PRN):      FAMILY HISTORY:      SOCIAL HISTORY:  CIGARETTES:  ALCOHOL:    ROS: Negative other than as mentioned in HPI.    Vital Signs Last 24 Hrs  T(C): 36.8 (02 Oct 2020 12:55), Max: 36.8 (02 Oct 2020 12:55)  T(F): 98.2 (02 Oct 2020 12:55), Max: 98.2 (02 Oct 2020 12:55)  HR: 65 (02 Oct 2020 12:55) (65 - 65)  BP: 143/64 (02 Oct 2020 12:55) (143/64 - 143/64)  BP(mean): --  RR: 18 (02 Oct 2020 12:55) (18 - 18)  SpO2: 94% (02 Oct 2020 12:55) (94% - 94%)    PHYSICAL EXAM:  General: Appears well developed, well nourished alert and cooperative.  HEENT: Head; normocephalic, atraumatic. sclera anicteric, MMM, no JVD, neck is supple   CARDIOVASCULAR; 1/6 NIR, no rub, gallop or lift. Normal S1 and S2.  LUNGS; No rales, rhonchi or wheeze. Normal breath sounds bilaterally.  ABDOMEN ; Soft, nontender without mass or organomegaly. bowel sounds normoactive.  EXTREMITIES; No clubbing, cyanosis or edema. Distal pulses wnl. ROM normal.  SKIN; warm and dry with normal turgor.  NEURO; Alert/oriented x 3/normal motor exam.     PSYCH; normal affect.        I&O's Detail      LABS:                        13.5   9.18  )-----------( 106      ( 02 Oct 2020 18:44 )             39.5     10-02    138  |  104  |  22.0<H>  ----------------------------<  130<H>  4.0   |  23.0  |  0.91    Ca    10.0      02 Oct 2020 18:44          PT/INR - ( 02 Oct 2020 18:44 )   PT: 13.2 sec;   INR: 1.15 ratio         PTT - ( 02 Oct 2020 18:44 )  PTT:29.1 sec    I&O's Summary      RADIOLOGY & ADDITIONAL STUDIES: [facility], THE HE[facility] CENTER                [address] [place] [address]                                     PHONE: [phone]                                       FAX: [phone]  [email or web address]      Reason for Consult: pre-operative evaluation     HPI: Patient is an 86 y/o woman with Parkinson's dementia with gait instability and recurrent fall, hypertension, dyslipidemia, mild non-obstructive CAD, severe AS pending consideration for possible TAVR, PVD (right iliac stenosis and possible chronic thrombus of the left iliac), precluding femoral approach for TAVR, who lives with 24 hour aides who presents post mechanical fall found to have a non-displaced impacted right subcapital femoral neck fracture for which she is pending consideration for operative repair.   She is difficult to orient however reports buttock/hip pain.      PAST MEDICAL & SURGICAL HISTORY:  Falls frequently  Parkinsonism, unspecified Parkinsonism type  Depression, unspecified depression type  Alzheimer&#x27;s dementia without behavioral disturbance, unspecified timing of dementia onset    No significant past surgical history      PREVIOUS DIAGNOSTIC TESTING:      EKG: 10/2/2020: Normal sinus rhythm with non-specific ST-T changes     ECHO FINDINGS:     CATHETERIZATION FINDINGS: mild CAD, severe AS    MEDICATIONS  (STANDING):  amLODIPine   Tablet 5 milliGRAM(s) Oral daily  aspirin enteric coated 81 milliGRAM(s) Oral at bedtime  atorvastatin 20 milliGRAM(s) Oral at bedtime  carbidopa/levodopa  25/100 1 Tablet(s) Oral three times a day  cholecalciferol 2000 Unit(s) Oral every 48 hours  donepezil 5 milliGRAM(s) Oral daily  donepezil 5 milliGRAM(s) Oral at bedtime  losartan 50 milliGRAM(s) Oral daily  memantine 10 milliGRAM(s) Oral two times a day  sertraline 100 milliGRAM(s) Oral daily    MEDICATIONS  (PRN):    FAMILY HISTORY: unable to provide     SOCIAL HISTORY:  CIGARETTES: denies   ALCOHOL: denies     ROS: Negative other than as mentioned in HPI. limited by dementia     Vital Signs Last 24 Hrs  T(C): 36.8 (02 Oct 2020 12:55), Max: 36.8 (02 Oct 2020 12:55)  T(F): 98.2 (02 Oct 2020 12:55), Max: 98.2 (02 Oct 2020 12:55)  HR: 65 (02 Oct 2020 12:55) (65 - 65)  BP: 143/64 (02 Oct 2020 12:55) (143/64 - 143/64)  BP(mean): --  RR: 18 (02 Oct 2020 12:55) (18 - 18)  SpO2: 94% (02 Oct 2020 12:55) (94% - 94%)    PHYSICAL EXAM:  General: Appears well developed, well nourished alert and cooperative.  HEENT: Head; normocephalic, atraumatic. sclera anicteric, MMM, no JVD, neck is supple   CARDIOVASCULAR; 3/6 NIR at RUSB, no rub, gallop or lift. Normal S1 and S2.  LUNGS; No rales, rhonchi or wheeze. Normal breath sounds bilaterally.  ABDOMEN ; Soft, nontender without mass or organomegaly. bowel sounds normoactive.  EXTREMITIES; No clubbing, cyanosis or edema. Distal pulses wnl. ROM normal.  SKIN; warm and dry with normal turgor.  NEURO; Alert/oriented x 1/normal motor exam.     PSYCH; normal affect.        I&O's Detail      LABS:                        13.5   9.18  )-----------( 106      ( 02 Oct 2020 18:44 )             39.5     10-02    138  |  104  |  22.0<H>  ----------------------------<  130<H>  4.0   |  23.0  |  0.91    Ca    10.0      02 Oct 2020 18:44          PT/INR - ( 02 Oct 2020 18:44 )   PT: 13.2 sec;   INR: 1.15 ratio         PTT - ( 02 Oct 2020 18:44 )  PTT:29.1 sec    I&O's Summary      RADIOLOGY & ADDITIONAL STUDIES:

## 2020-10-02 NOTE — ED PROVIDER NOTE - PMH
Alzheimer's dementia without behavioral disturbance, unspecified timing of dementia onset    Cholesterolosis    Depression, unspecified depression type    Falls frequently    Parkinsonism, unspecified Parkinsonism type

## 2020-10-02 NOTE — ED PROVIDER NOTE - CARE PLAN
Principal Discharge DX:	Hip pain  Secondary Diagnosis:	Falls frequently   Principal Discharge DX:	Closed fracture of right hip, initial encounter  Secondary Diagnosis:	Falls frequently

## 2020-10-02 NOTE — ED PROVIDER NOTE - CLINICAL SUMMARY MEDICAL DECISION MAKING FREE TEXT BOX
elderly female with parkinsonism who has falls as per daughter, fell last  night now pain RLE reggie hip and femur  pe as doc  xrays and if + will obtain labs and admit

## 2020-10-02 NOTE — CONSULT NOTE ADULT - SUBJECTIVE AND OBJECTIVE BOX
Pt Name: GENO VERA  MRN: [id number]    Patient is a 85y Female w/ pmhx of severe AS, Dementia presenting to the emergency department with a chief complaint of right hip pain. Patient has dementia and unable to provide history. Daughters at bedside to provide history. As per daughter, patient fell yesterday (unknown why). Was able to move leg but unable to weight bear on effected extremity. Daughters waited a day to monitor for improvement. Unable to weight bear and was instructed to go to ED. Patient denies sensory/motor changes.    Patient follows with Dr. Bhatt for cardiology. Recently was told she was not a surgical candidate for tavr due to AS.     REVIEW OF SYSTEMS  Unable to obtain due to patients cognition.     PAST MEDICAL & SURGICAL HISTORY:  PAST MEDICAL & SURGICAL HISTORY:  Falls frequently    Parkinsonism, unspecified Parkinsonism type    Depression, unspecified depression type  Alzheimer&#x27;s dementia without behavioral disturbance, unspecified timing of dementia onset  Cholesterolosis  No significant past surgical history        Allergies: No Known Allergies      Medications: amLODIPine   Tablet 5 milliGRAM(s) Oral daily  aspirin enteric coated 81 milliGRAM(s) Oral at bedtime  atorvastatin 20 milliGRAM(s) Oral at bedtime  carbidopa/levodopa  25/100 1 Tablet(s) Oral three times a day  cholecalciferol 2000 Unit(s) Oral every 48 hours  donepezil 5 milliGRAM(s) Oral daily  donepezil 5 milliGRAM(s) Oral at bedtime  losartan 50 milliGRAM(s) Oral daily  memantine 10 milliGRAM(s) Oral two times a day  sertraline 100 milliGRAM(s) Oral daily      FAMILY HISTORY:  : non-contributory    Social History:     Ambulation: Walking independently [ ] With Cane [ ] With Walker [ ]  Bedbound [ ]                           13.5   9.18  )-----------( 106      ( 02 Oct 2020 18:44 )             39.5       10-02    138  |  104  |  22.0<H>  ----------------------------<  130<H>  4.0   |  23.0  |  0.91    Ca    10.0      02 Oct 2020 18:44        Vital Signs Last 24 Hrs  T(C): 36.8 (02 Oct 2020 12:55), Max: 36.8 (02 Oct 2020 12:55)  T(F): 98.2 (02 Oct 2020 12:55), Max: 98.2 (02 Oct 2020 12:55)  HR: 65 (02 Oct 2020 12:55) (65 - 65)  BP: 143/64 (02 Oct 2020 12:55) (143/64 - 143/64)  BP(mean): --  RR: 18 (02 Oct 2020 12:55) (18 - 18)  SpO2: 94% (02 Oct 2020 12:55) (94% - 94%)    Daily Height in cm: 167.64 (02 Oct 2020 12:55)    Daily       PHYSICAL EXAM (limited due to dementia):  Appearance: Alert, responsive, in no acute distress. Confused.    Injured extremity (RLE)  Skin: no rash on visible skin. Skin is clean, dry and intact. No bleeding. No abrasions. No ulcerations.  Neurological: Sensation is grossly intact to light touch.   Motor: Limited +DF/PF  Vascular: 2+ distal pulses. Cap refill < 2 sec. No signs of venous insufficiency or stasis. No extremity ulcerations. No cyanosis.    Imaging Studies:     EXAM:  XR FEMUR 2 VIEWS RT                         EXAM:  XR HIP 2-3V RT                          PROCEDURE DATE:  10/02/2020          INTERPRETATION:  Radiographs of the RIGHT hip and RIGHT femur    CLINICAL INFORMATION:  Injury with   Pain.    TECHNIQUE:   AP and oblique views of the hip were obtained.  AP lateral RIGHT femur radiographs  FINDINGS:   No prior exams are available for comparison.    There are is an impacted fracture through the RIGHT femoral neck. Femoral head lies within acetabulum. Distal femur intact. Remaining  visualized RIGHT hemipelvis intact.    No soft tissue abnormality is recognized. [-.]    IMPRESSION:    Suspect an impacted RIGHT femoral neck fracture deformity. Confirmation CT scanning recommended.         EXAM:  CT PELVIS BONY ONLY                         EXAM:  CT 3D RECONSTRUCT W RENATAERUMBanner                          PROCEDURE DATE:  10/02/2020          INTERPRETATION:  HISTORY: Right femoral neck fracture.    Helical CT imaging of the bony pelvis was performed without intravenous contrast. Sagittal and coronal reformats were provided. 3-D reformats were performed on a separate workstation.    Correlation is made with prior radiographs from October 2, 2020.    Findings:    There is an acute subcapital right femoral neck fracture with impaction of fracture fragments with apex anterior angulation. There is no dislocation. There is surrounding soft tissue edema about the right hip.    There is chronic appearing angulation at the sacrococcygeal junction likely related to remote trauma. There is mild bilateral hip arthrosis. There is moderate pubic symphysis arthrosis. Sacroiliac joints are preserved. There is lower lumbar spondylosis.    There is atherosclerotic disease. Cystic structures are seen within the ovaries bilaterally measuring 4.3 x 2.3 cm on the left and 3.1 x 2 cm on the right. Further evaluation with pelvic ultrasound is recommended. There is colonic diverticulosis.    Impression:    Acute impacted right subcapital femoral neck fracture.    Nonspecific cystic lesions within the bilateral ovaries adrenal to 4.3 cm for which further evaluation with pelvic ultrasound is recommended.    Findings discussed with Dr. Ira SANTOYO M.D., ATTENDING RADIOLOGIST  This document has been electronically signed. Oct  2 2020  5:57PM        SUNNI TAMAYO M.D., ATTENDING RADIOLOGIST  This document has been electronically signed. Oct  2 2020  4:30PM      A/P:  Pt is a 85y Female with right subcap fx    PLAN:   * Orthopedic surgical intervention (hemiarthroplasty).  * Anesthesia notified. Cardiology at bedside.  * NPO for OR tomorrow if cleared  * IV fluids ordered and to start once NPO  * Pre-operative ABX ordered  * Bed rest

## 2020-10-03 DIAGNOSIS — S72.001A FRACTURE OF UNSPECIFIED PART OF NECK OF RIGHT FEMUR, INITIAL ENCOUNTER FOR CLOSED FRACTURE: ICD-10-CM

## 2020-10-03 LAB
SARS-COV-2 IGG SERPL QL IA: NEGATIVE — SIGNIFICANT CHANGE UP
SARS-COV-2 IGM SERPL IA-ACNC: 0.09 INDEX — SIGNIFICANT CHANGE UP
SARS-COV-2 RNA SPEC QL NAA+PROBE: SIGNIFICANT CHANGE UP

## 2020-10-03 PROCEDURE — 99221 1ST HOSP IP/OBS SF/LOW 40: CPT

## 2020-10-03 PROCEDURE — 99283 EMERGENCY DEPT VISIT LOW MDM: CPT

## 2020-10-03 RX ORDER — CARBIDOPA AND LEVODOPA 25; 100 MG/1; MG/1
1 TABLET ORAL THREE TIMES A DAY
Refills: 0 | Status: DISCONTINUED | OUTPATIENT
Start: 2020-10-03 | End: 2020-10-03

## 2020-10-03 RX ORDER — ENOXAPARIN SODIUM 100 MG/ML
30 INJECTION SUBCUTANEOUS
Refills: 0 | Status: DISCONTINUED | OUTPATIENT
Start: 2020-10-03 | End: 2020-10-03

## 2020-10-03 RX ORDER — AMLODIPINE BESYLATE 2.5 MG/1
5 TABLET ORAL DAILY
Refills: 0 | Status: DISCONTINUED | OUTPATIENT
Start: 2020-10-03 | End: 2020-10-03

## 2020-10-03 RX ORDER — MEMANTINE HYDROCHLORIDE 10 MG/1
10 TABLET ORAL
Refills: 0 | Status: DISCONTINUED | OUTPATIENT
Start: 2020-10-03 | End: 2020-10-03

## 2020-10-03 RX ORDER — DONEPEZIL HYDROCHLORIDE 10 MG/1
5 TABLET, FILM COATED ORAL AT BEDTIME
Refills: 0 | Status: DISCONTINUED | OUTPATIENT
Start: 2020-10-03 | End: 2020-10-03

## 2020-10-03 RX ORDER — ACETAMINOPHEN 500 MG
650 TABLET ORAL EVERY 6 HOURS
Refills: 0 | Status: DISCONTINUED | OUTPATIENT
Start: 2020-10-03 | End: 2020-10-07

## 2020-10-03 RX ORDER — ATORVASTATIN CALCIUM 80 MG/1
20 TABLET, FILM COATED ORAL AT BEDTIME
Refills: 0 | Status: DISCONTINUED | OUTPATIENT
Start: 2020-10-03 | End: 2020-10-03

## 2020-10-03 RX ORDER — SERTRALINE 25 MG/1
100 TABLET, FILM COATED ORAL DAILY
Refills: 0 | Status: DISCONTINUED | OUTPATIENT
Start: 2020-10-03 | End: 2020-10-03

## 2020-10-03 RX ORDER — SODIUM CHLORIDE 9 MG/ML
1000 INJECTION, SOLUTION INTRAVENOUS
Refills: 0 | Status: DISCONTINUED | OUTPATIENT
Start: 2020-10-03 | End: 2020-10-05

## 2020-10-03 RX ORDER — LOSARTAN POTASSIUM 100 MG/1
50 TABLET, FILM COATED ORAL DAILY
Refills: 0 | Status: DISCONTINUED | OUTPATIENT
Start: 2020-10-03 | End: 2020-10-03

## 2020-10-03 RX ADMIN — DONEPEZIL HYDROCHLORIDE 5 MILLIGRAM(S): 10 TABLET, FILM COATED ORAL at 21:49

## 2020-10-03 RX ADMIN — SODIUM CHLORIDE 80 MILLILITER(S): 9 INJECTION, SOLUTION INTRAVENOUS at 01:47

## 2020-10-03 RX ADMIN — CARBIDOPA AND LEVODOPA 1 TABLET(S): 25; 100 TABLET ORAL at 05:51

## 2020-10-03 RX ADMIN — Medication 650 MILLIGRAM(S): at 16:38

## 2020-10-03 RX ADMIN — CARBIDOPA AND LEVODOPA 1 TABLET(S): 25; 100 TABLET ORAL at 21:49

## 2020-10-03 RX ADMIN — MEMANTINE HYDROCHLORIDE 10 MILLIGRAM(S): 10 TABLET ORAL at 05:51

## 2020-10-03 RX ADMIN — MEMANTINE HYDROCHLORIDE 10 MILLIGRAM(S): 10 TABLET ORAL at 17:48

## 2020-10-03 RX ADMIN — SODIUM CHLORIDE 80 MILLILITER(S): 9 INJECTION, SOLUTION INTRAVENOUS at 21:50

## 2020-10-03 RX ADMIN — Medication 650 MILLIGRAM(S): at 17:46

## 2020-10-03 RX ADMIN — SERTRALINE 100 MILLIGRAM(S): 25 TABLET, FILM COATED ORAL at 14:24

## 2020-10-03 RX ADMIN — ATORVASTATIN CALCIUM 20 MILLIGRAM(S): 80 TABLET, FILM COATED ORAL at 21:49

## 2020-10-03 RX ADMIN — Medication 650 MILLIGRAM(S): at 01:23

## 2020-10-03 RX ADMIN — CARBIDOPA AND LEVODOPA 1 TABLET(S): 25; 100 TABLET ORAL at 14:24

## 2020-10-03 RX ADMIN — DONEPEZIL HYDROCHLORIDE 5 MILLIGRAM(S): 10 TABLET, FILM COATED ORAL at 14:24

## 2020-10-03 NOTE — H&P ADULT - ASSESSMENT
A 86 yo F, frail, w/ h/o Alz, PD, and aortic stenosis who fell and has a right femoral neck fx. Plan for ortho to perform arthroplasty.     - Admit to Trauma SErvice  - NPO past-midnight  - Cards consult appreciated, f/u TTE  - Anesthesia consult appretiated,   - Pain control as needed  - DVT ppx, hold AM for procedure   -  Resume home meds with sips of water

## 2020-10-03 NOTE — H&P ADULT - HISTORY OF PRESENT ILLNESS
A 86 yo F with h/o Alzheimer's who lives alone w/ 24/7 nursing aid fell 1 day ago. She normally mobilizes with walker and requires assistance for daily living. She was c/o pain to daughter and unable to bare weight. The rest of HPI was taken from daughter provided patient has Alz and PD and was slightly confused. Daughter denies that patient hit her head or had external signs of bleeding, takes no antiplatelet or anticoagulation therapy. Denies CP, SOB, vertigo, or tinnitus.

## 2020-10-03 NOTE — ED ADULT NURSE REASSESSMENT NOTE - NS ED NURSE REASSESS COMMENT FT1
Report given to CDU nurse, Patient in stable condition, awake and confused. No respiratory distress. 1:1 at bedside.

## 2020-10-03 NOTE — H&P ADULT - ATTENDING COMMENTS
The patient was seen and examined  Details per the resident's H&P  This is an 85-year old woman who fell a day prior to presentation  The patient was worked up by the ED and found to have a right hip fracture  No LOC  No hypotension    Exam:  Neurologic:  GCS=15, pupils equal and reactive  Lungs B/L air entry  Pelvis:  Right sided tenderness  Extremities:  R leg with external rotation, N/V intact    Impression:  R hip fracture    Plan:  Admit  Orthopedics consult  CXR, ECG  Cardiology consult  DVT prophylaxis

## 2020-10-03 NOTE — H&P ADULT - NSICDXPASTMEDICALHX_GEN_ALL_CORE_FT
PAST MEDICAL HISTORY:  Alzheimer's dementia without behavioral disturbance, unspecified timing of dementia onset     Cholesterolosis     Depression, unspecified depression type     Falls frequently     Parkinsonism, unspecified Parkinsonism type

## 2020-10-03 NOTE — H&P ADULT - NSHPPHYSICALEXAM_GEN_ALL_CORE
Gen: Awake, alert, disoriented, GCS 14   Cards: RRR, +murmur   Pulm: non-labored   Abd: soft, nd, non-tender  Pelvis: R hip hematoma  Ext: pulses palpable, no ischemic changes, no inversions/eversions

## 2020-10-03 NOTE — PROGRESS NOTE ADULT - SUBJECTIVE AND OBJECTIVE BOX
[facility], THE HEART CENTER                                   [address] [place] [address]                                                      PHONE: [phone]                                                         FAX: [phone]  [email or web address] [email or web address]  ---------------------------------------------------------------------------------------------------------------------------------    Overnight events/patient complaints:  Patient is confused this mornign and cannot give a hx    PAST MEDICAL & SURGICAL HISTORY:  Falls frequently    Parkinsonism, unspecified Parkinsonism type    Depression, unspecified depression type    Alzheimer&#x27;s dementia without behavioral disturbance, unspecified timing of dementia onset    Cholesterolosis    No significant past surgical history        No Known Allergies    MEDICATIONS  (STANDING):  amLODIPine   Tablet 5 milliGRAM(s) Oral daily  atorvastatin 20 milliGRAM(s) Oral at bedtime  carbidopa/levodopa  25/100 1 Tablet(s) Oral three times a day  cholecalciferol 2000 Unit(s) Oral every 48 hours  donepezil 5 milliGRAM(s) Oral daily  donepezil 5 milliGRAM(s) Oral at bedtime  lactated ringers. 1000 milliLiter(s) (80 mL/Hr) IV Continuous <Continuous>  losartan 50 milliGRAM(s) Oral daily  memantine 10 milliGRAM(s) Oral two times a day  sertraline 100 milliGRAM(s) Oral daily    MEDICATIONS  (PRN):      Vital Signs Last 24 Hrs  T(C): 36.7 (03 Oct 2020 07:49), Max: 37.1 (03 Oct 2020 00:51)  T(F): 98 (03 Oct 2020 07:49), Max: 98.8 (03 Oct 2020 00:51)  HR: 77 (03 Oct 2020 07:49) (65 - 77)  BP: 153/72 (03 Oct 2020 07:49) (120/68 - 153/72)  BP(mean): --  RR: 19 (03 Oct 2020 07:49) (18 - 19)  SpO2: 96% (03 Oct 2020 07:49) (94% - 96%)  ICU Vital Signs Last 24 Hrs  GENO VERA  I&O's Detail    I&O's Summary    Drug Dosing Weight  GENO VERA      PHYSICAL EXAM:  General: Appears well developed, well nourished alert and cooperative.  HEENT: Head; normocephalic, atraumatic.  Eyes: Pupils reactive, cornea wnl.  Neck: Supple, no nodes adenopathy, no NVD or carotid bruit or thyromegaly.  CARDIOVASCULAR: 3/6 NIR at RUSB, no rub, gallop or lift. Normal S1 and S2.  LUNGS: No rales, rhonchi or wheeze. Normal breath sounds bilaterally.  ABDOMEN: Soft, nontender without mass or organomegaly. bowel sounds normoactive.  EXTREMITIES: No clubbing, cyanosis or edema. Distal pulses wnl.   SKIN: warm and dry with normal turgor.  NEURO: Alert/oriented x 1    PSYCH: confused        LABS:                        13.5   9.18  )-----------( 106      ( 02 Oct 2020 18:44 )             39.5     10-02    138  |  104  |  22.0<H>  ----------------------------<  130<H>  4.0   |  23.0  |  0.91    Ca    10.0      02 Oct 2020 18:44      GENO VERA      PT/INR - ( 02 Oct 2020 18:44 )   PT: 13.2 sec;   INR: 1.15 ratio         PTT - ( 02 Oct 2020 18:44 )  PTT:29.1 sec      RADIOLOGY & ADDITIONAL STUDIES:    INTERPRETATION OF TELEMETRY (personally reviewed):    ECG:    ECHO:    STRESS TEST:    CARDIAC CATHETERIZATION:    ASSESSMENT AND PLAN:  In summary, GENO VERA is an 85y Female with past medical history significant for dementia, severe AS, nonobstructive CAD, PVD presents with fall found to have hip fx.    Preoperative risk assessment  Patient is a high risk for surgery.  She currently has an active cardiac contraindication with severe aortic stenosis.  If patient orthopedic surgery deems it appropriate to perform surgery under local anesthesia with less invasive procedure, she might tolerate it hemodynamically.  She would be high risk for general anesthesia.    If she needs a surgery with general anesthesia, would recommend BAV prior to surgery to bridge her through the surgery and then TAVR workup afterwards    HLD  Continue atorvastatin 20mg qhs    HTN  Continue losartan and amlodipine    Thank you for allowing [facility] to participate in the care of this patient.  Please feel free to call with any questions or concerns.

## 2020-10-04 LAB
ANION GAP SERPL CALC-SCNC: 12 MMOL/L — SIGNIFICANT CHANGE UP (ref 5–17)
APPEARANCE UR: ABNORMAL
APTT BLD: 28.4 SEC — SIGNIFICANT CHANGE UP (ref 27.5–35.5)
BACTERIA # UR AUTO: ABNORMAL
BASOPHILS # BLD AUTO: 0.05 K/UL — SIGNIFICANT CHANGE UP (ref 0–0.2)
BASOPHILS NFR BLD AUTO: 0.6 % — SIGNIFICANT CHANGE UP (ref 0–2)
BILIRUB UR-MCNC: NEGATIVE — SIGNIFICANT CHANGE UP
BUN SERPL-MCNC: 20 MG/DL — SIGNIFICANT CHANGE UP (ref 8–20)
CALCIUM SERPL-MCNC: 9.3 MG/DL — SIGNIFICANT CHANGE UP (ref 8.6–10.2)
CHLORIDE SERPL-SCNC: 104 MMOL/L — SIGNIFICANT CHANGE UP (ref 98–107)
CO2 SERPL-SCNC: 23 MMOL/L — SIGNIFICANT CHANGE UP (ref 22–29)
COLOR SPEC: YELLOW — SIGNIFICANT CHANGE UP
CREAT SERPL-MCNC: 0.78 MG/DL — SIGNIFICANT CHANGE UP (ref 0.5–1.3)
DIFF PNL FLD: ABNORMAL
EOSINOPHIL # BLD AUTO: 0.18 K/UL — SIGNIFICANT CHANGE UP (ref 0–0.5)
EOSINOPHIL NFR BLD AUTO: 2.2 % — SIGNIFICANT CHANGE UP (ref 0–6)
EPI CELLS # UR: SIGNIFICANT CHANGE UP
GLUCOSE SERPL-MCNC: 101 MG/DL — HIGH (ref 70–99)
GLUCOSE UR QL: NEGATIVE MG/DL — SIGNIFICANT CHANGE UP
HCT VFR BLD CALC: 35.7 % — SIGNIFICANT CHANGE UP (ref 34.5–45)
HGB BLD-MCNC: 12 G/DL — SIGNIFICANT CHANGE UP (ref 11.5–15.5)
IMM GRANULOCYTES NFR BLD AUTO: 0.5 % — SIGNIFICANT CHANGE UP (ref 0–1.5)
INR BLD: 1.1 RATIO — SIGNIFICANT CHANGE UP (ref 0.88–1.16)
KETONES UR-MCNC: ABNORMAL
LEUKOCYTE ESTERASE UR-ACNC: ABNORMAL
LYMPHOCYTES # BLD AUTO: 0.96 K/UL — LOW (ref 1–3.3)
LYMPHOCYTES # BLD AUTO: 11.6 % — LOW (ref 13–44)
MAGNESIUM SERPL-MCNC: 2 MG/DL — SIGNIFICANT CHANGE UP (ref 1.6–2.6)
MCHC RBC-ENTMCNC: 29.4 PG — SIGNIFICANT CHANGE UP (ref 27–34)
MCHC RBC-ENTMCNC: 33.6 GM/DL — SIGNIFICANT CHANGE UP (ref 32–36)
MCV RBC AUTO: 87.5 FL — SIGNIFICANT CHANGE UP (ref 80–100)
MONOCYTES # BLD AUTO: 0.98 K/UL — HIGH (ref 0–0.9)
MONOCYTES NFR BLD AUTO: 11.9 % — SIGNIFICANT CHANGE UP (ref 2–14)
NEUTROPHILS # BLD AUTO: 6.05 K/UL — SIGNIFICANT CHANGE UP (ref 1.8–7.4)
NEUTROPHILS NFR BLD AUTO: 73.2 % — SIGNIFICANT CHANGE UP (ref 43–77)
NITRITE UR-MCNC: NEGATIVE — SIGNIFICANT CHANGE UP
PH UR: 7 — SIGNIFICANT CHANGE UP (ref 5–8)
PHOSPHATE SERPL-MCNC: 1.9 MG/DL — LOW (ref 2.4–4.7)
PLATELET # BLD AUTO: 108 K/UL — LOW (ref 150–400)
POTASSIUM SERPL-MCNC: 3.7 MMOL/L — SIGNIFICANT CHANGE UP (ref 3.5–5.3)
POTASSIUM SERPL-SCNC: 3.7 MMOL/L — SIGNIFICANT CHANGE UP (ref 3.5–5.3)
PROT UR-MCNC: 30 MG/DL
PROTHROM AB SERPL-ACNC: 12.7 SEC — SIGNIFICANT CHANGE UP (ref 10.6–13.6)
RBC # BLD: 4.08 M/UL — SIGNIFICANT CHANGE UP (ref 3.8–5.2)
RBC # FLD: 13.2 % — SIGNIFICANT CHANGE UP (ref 10.3–14.5)
RBC CASTS # UR COMP ASSIST: ABNORMAL /HPF (ref 0–4)
SODIUM SERPL-SCNC: 139 MMOL/L — SIGNIFICANT CHANGE UP (ref 135–145)
SP GR SPEC: 1.01 — SIGNIFICANT CHANGE UP (ref 1.01–1.02)
UROBILINOGEN FLD QL: 4 MG/DL
WBC # BLD: 8.26 K/UL — SIGNIFICANT CHANGE UP (ref 3.8–10.5)
WBC # FLD AUTO: 8.26 K/UL — SIGNIFICANT CHANGE UP (ref 3.8–10.5)
WBC UR QL: >50

## 2020-10-04 PROCEDURE — 99231 SBSQ HOSP IP/OBS SF/LOW 25: CPT | Mod: GC

## 2020-10-04 RX ORDER — LANOLIN ALCOHOL/MO/W.PET/CERES
3 CREAM (GRAM) TOPICAL AT BEDTIME
Refills: 0 | Status: DISCONTINUED | OUTPATIENT
Start: 2020-10-04 | End: 2020-10-07

## 2020-10-04 RX ORDER — SODIUM,POTASSIUM PHOSPHATES 278-250MG
2 POWDER IN PACKET (EA) ORAL EVERY 4 HOURS
Refills: 0 | Status: COMPLETED | OUTPATIENT
Start: 2020-10-04 | End: 2020-10-04

## 2020-10-04 RX ADMIN — DONEPEZIL HYDROCHLORIDE 5 MILLIGRAM(S): 10 TABLET, FILM COATED ORAL at 11:37

## 2020-10-04 RX ADMIN — LOSARTAN POTASSIUM 50 MILLIGRAM(S): 100 TABLET, FILM COATED ORAL at 05:56

## 2020-10-04 RX ADMIN — ATORVASTATIN CALCIUM 20 MILLIGRAM(S): 80 TABLET, FILM COATED ORAL at 21:14

## 2020-10-04 RX ADMIN — Medication 650 MILLIGRAM(S): at 22:10

## 2020-10-04 RX ADMIN — MEMANTINE HYDROCHLORIDE 10 MILLIGRAM(S): 10 TABLET ORAL at 05:56

## 2020-10-04 RX ADMIN — AMLODIPINE BESYLATE 5 MILLIGRAM(S): 2.5 TABLET ORAL at 05:56

## 2020-10-04 RX ADMIN — CARBIDOPA AND LEVODOPA 1 TABLET(S): 25; 100 TABLET ORAL at 21:14

## 2020-10-04 RX ADMIN — SERTRALINE 100 MILLIGRAM(S): 25 TABLET, FILM COATED ORAL at 11:36

## 2020-10-04 RX ADMIN — Medication 2000 UNIT(S): at 18:11

## 2020-10-04 RX ADMIN — SODIUM CHLORIDE 80 MILLILITER(S): 9 INJECTION, SOLUTION INTRAVENOUS at 00:29

## 2020-10-04 RX ADMIN — Medication 62.5 MILLIMOLE(S): at 11:36

## 2020-10-04 RX ADMIN — Medication 3 MILLIGRAM(S): at 21:14

## 2020-10-04 RX ADMIN — CARBIDOPA AND LEVODOPA 1 TABLET(S): 25; 100 TABLET ORAL at 14:06

## 2020-10-04 RX ADMIN — CARBIDOPA AND LEVODOPA 1 TABLET(S): 25; 100 TABLET ORAL at 05:56

## 2020-10-04 RX ADMIN — MEMANTINE HYDROCHLORIDE 10 MILLIGRAM(S): 10 TABLET ORAL at 18:11

## 2020-10-04 RX ADMIN — Medication 650 MILLIGRAM(S): at 04:00

## 2020-10-04 RX ADMIN — Medication 2 PACKET(S): at 14:06

## 2020-10-04 RX ADMIN — Medication 650 MILLIGRAM(S): at 21:14

## 2020-10-04 RX ADMIN — Medication 2 PACKET(S): at 11:36

## 2020-10-04 RX ADMIN — DONEPEZIL HYDROCHLORIDE 5 MILLIGRAM(S): 10 TABLET, FILM COATED ORAL at 21:14

## 2020-10-04 RX ADMIN — Medication 650 MILLIGRAM(S): at 05:00

## 2020-10-04 NOTE — PROGRESS NOTE ADULT - SUBJECTIVE AND OBJECTIVE BOX
HPI/OVERNIGHT EVENTS: NAEON. Patient has baseline dementia and confusion, but has no new complaints. Patient's daughter, who is her health care proxy, provided additional information yesterday about patient's history, to include prior history of aortic stenosis. As patient is high risk for anesthesia given severe aortic stenosis, Cards is tentatively planning for balloon valvuloplasty prior to OR with ortho.    MEDICATIONS  (STANDING):  amLODIPine   Tablet 5 milliGRAM(s) Oral daily  atorvastatin 20 milliGRAM(s) Oral at bedtime  carbidopa/levodopa  25/100 1 Tablet(s) Oral three times a day  cholecalciferol 2000 Unit(s) Oral every 48 hours  donepezil 5 milliGRAM(s) Oral daily  donepezil 5 milliGRAM(s) Oral at bedtime  lactated ringers. 1000 milliLiter(s) (80 mL/Hr) IV Continuous <Continuous>  losartan 50 milliGRAM(s) Oral daily  memantine 10 milliGRAM(s) Oral two times a day  sertraline 100 milliGRAM(s) Oral daily    MEDICATIONS  (PRN):  acetaminophen   Tablet .. 650 milliGRAM(s) Oral every 6 hours PRN Moderate Pain (4 - 6)      Vital Signs Last 24 Hrs  T(C): 37.1 (04 Oct 2020 04:51), Max: 37.1 (04 Oct 2020 04:51)  T(F): 98.7 (04 Oct 2020 04:51), Max: 98.7 (04 Oct 2020 04:51)  HR: 66 (04 Oct 2020 04:51) (66 - 80)  BP: 145/75 (04 Oct 2020 04:51) (133/67 - 157/74)  RR: 18 (04 Oct 2020 04:51) (18 - 19)  SpO2: 91% (04 Oct 2020 04:51) (91% - 96%)    Gen: Alert but frequently confused. Redirectable. NAD  Pulm: non-labored respirations  CV: RRR,   Abd: soft, NTND  Ext: pulses palpable b/l LE      I&O's Detail    03 Oct 2020 07:01  -  04 Oct 2020 07:00  --------------------------------------------------------  IN:    Lactated Ringers: 480 mL  Total IN: 480 mL    OUT:    Voided (mL): 600 mL  Total OUT: 600 mL    Total NET: -120 mL          LABS:                        12.0   8.26  )-----------( 108      ( 04 Oct 2020 06:06 )             35.7     10-04    139  |  104  |  20.0  ----------------------------<  101<H>  3.7   |  23.0  |  0.78    Ca    9.3      04 Oct 2020 06:06  Phos  1.9     10-04  Mg     2.0     10-04      PT/INR - ( 04 Oct 2020 06:06 )   PT: 12.7 sec;   INR: 1.10 ratio         PTT - ( 04 Oct 2020 06:06 )  PTT:28.4 sec

## 2020-10-04 NOTE — PROGRESS NOTE ADULT - SUBJECTIVE AND OBJECTIVE BOX
[place] [facility] - [facility], THE HEART CENTER                                   [address] [place] [address]                                                      PHONE: [phone]                                                         FAX: [phone]  [email or web address]  ---------------------------------------------------------------------------------------------------------------------------------    Overnight events/patient complaints:  Patient feels well overnight.  I tried to reach out to the daughter and left a message to call back regarding BAV.     PAST MEDICAL & SURGICAL HISTORY:  Falls frequently    Parkinsonism, unspecified Parkinsonism type    Depression, unspecified depression type    Alzheimer&#x27;s dementia without behavioral disturbance, unspecified timing of dementia onset    Cholesterolosis    No significant past surgical history        No Known Allergies    MEDICATIONS  (STANDING):  amLODIPine   Tablet 5 milliGRAM(s) Oral daily  atorvastatin 20 milliGRAM(s) Oral at bedtime  carbidopa/levodopa  25/100 1 Tablet(s) Oral three times a day  cholecalciferol 2000 Unit(s) Oral every 48 hours  donepezil 5 milliGRAM(s) Oral daily  donepezil 5 milliGRAM(s) Oral at bedtime  lactated ringers. 1000 milliLiter(s) (80 mL/Hr) IV Continuous <Continuous>  losartan 50 milliGRAM(s) Oral daily  memantine 10 milliGRAM(s) Oral two times a day  potassium phosphate / sodium phosphate Powder (PHOS-NaK) 2 Packet(s) Oral every 4 hours  sertraline 100 milliGRAM(s) Oral daily  sodium phosphate IVPB 15 milliMole(s) IV Intermittent once    MEDICATIONS  (PRN):  acetaminophen   Tablet .. 650 milliGRAM(s) Oral every 6 hours PRN Moderate Pain (4 - 6)      Vital Signs Last 24 Hrs  T(C): 36.4 (04 Oct 2020 09:46), Max: 37.1 (04 Oct 2020 04:51)  T(F): 97.5 (04 Oct 2020 09:46), Max: 98.7 (04 Oct 2020 04:51)  HR: 70 (04 Oct 2020 09:46) (66 - 80)  BP: 117/66 (04 Oct 2020 09:46) (117/66 - 157/74)  BP(mean): --  RR: 18 (04 Oct 2020 09:46) (18 - 19)  SpO2: 95% (04 Oct 2020 09:46) (91% - 95%)  ICU Vital Signs Last 24 Hrs  GENOJOSE VERA  I&O's Detail    03 Oct 2020 07:01  -  04 Oct 2020 07:00  --------------------------------------------------------  IN:    Lactated Ringers: 480 mL  Total IN: 480 mL    OUT:    Voided (mL): 600 mL  Total OUT: 600 mL    Total NET: -120 mL        I&O's Summary    03 Oct 2020 07:01  -  04 Oct 2020 07:00  --------------------------------------------------------  IN: 480 mL / OUT: 600 mL / NET: -120 mL      Drug Dosing Weight  GENO VERA        PHYSICAL EXAM:  General: Appears well developed, well nourished alert and cooperative.  HEENT: Head; normocephalic, atraumatic.  Eyes: Pupils reactive, cornea wnl.  Neck: Supple, no nodes adenopathy, no NVD or carotid bruit or thyromegaly.  CARDIOVASCULAR: 3/6 NIR at RUSB, no rub, gallop or lift. Normal S1 and S2.  LUNGS: No rales, rhonchi or wheeze. Normal breath sounds bilaterally.  ABDOMEN: Soft, nontender without mass or organomegaly. bowel sounds normoactive.  EXTREMITIES: No clubbing, cyanosis or edema. Distal pulses wnl.   SKIN: warm and dry with normal turgor.  NEURO: Alert/oriented x 1    PSYCH: confused        LABS:                        12.0   8.26  )-----------( 108      ( 04 Oct 2020 06:06 )             35.7     10-04    139  |  104  |  20.0  ----------------------------<  101<H>  3.7   |  23.0  |  0.78    Ca    9.3      04 Oct 2020 06:06  Phos  1.9     10-04  Mg     2.0     10-04      GENO VERA      PT/INR - ( 04 Oct 2020 06:06 )   PT: 12.7 sec;   INR: 1.10 ratio         PTT - ( 04 Oct 2020 06:06 )  PTT:28.4 sec  Urinalysis Basic - ( 04 Oct 2020 10:13 )    Color: Yellow / Appearance: Slightly Turbid / S.010 / pH: x  Gluc: x / Ketone: Trace  / Bili: Negative / Urobili: 4 mg/dL   Blood: x / Protein: 30 mg/dL / Nitrite: Negative   Leuk Esterase: Moderate / RBC: 3-5 /HPF / WBC >50   Sq Epi: x / Non Sq Epi: Occasional / Bacteria: Many        RADIOLOGY & ADDITIONAL STUDIES:    INTERPRETATION OF TELEMETRY (personally reviewed):    ECG:    ECHO:    STRESS TEST:    CARDIAC CATHETERIZATION:    ASSESSMENT AND PLAN:  In summary, GENO VERA is an 85y Female with past medical history significant for dementia, severe AS, nonobstructive CAD, PVD presents with fall found to have hip fx.    Preoperative risk assessment  Patient is a high risk for surgery.  She currently has an active cardiac contraindication with severe aortic stenosis.  If patient orthopedic surgery deems it appropriate to perform surgery under local anesthesia with less invasive procedure, she might tolerate it hemodynamically.  She would be high risk for general anesthesia.    Would recommend BAV prior to surgery to bridge her through the surgery and then TAVR workup afterwards    HLD  Continue atorvastatin 20mg qhs    HTN  Continue losartan and amlodipine    Thank you for allowing [facility] to participate in the care of this patient.  Please feel free to call with any questions or concerns.    [place] [facility] - [facility], THE HEART CENTER                                   [address] [place] [address]                                                      PHONE: [phone]                                                         FAX: [phone]  [email or web address]  ---------------------------------------------------------------------------------------------------------------------------------    Overnight events/patient complaints:  Patient feels well overnight.  I tried to reach out to the daughter and left a message to call back regarding BAV.     PAST MEDICAL & SURGICAL HISTORY:  Falls frequently    Parkinsonism, unspecified Parkinsonism type    Depression, unspecified depression type    Alzheimer&#x27;s dementia without behavioral disturbance, unspecified timing of dementia onset    Cholesterolosis    No significant past surgical history        No Known Allergies    MEDICATIONS  (STANDING):  amLODIPine   Tablet 5 milliGRAM(s) Oral daily  atorvastatin 20 milliGRAM(s) Oral at bedtime  carbidopa/levodopa  25/100 1 Tablet(s) Oral three times a day  cholecalciferol 2000 Unit(s) Oral every 48 hours  donepezil 5 milliGRAM(s) Oral daily  donepezil 5 milliGRAM(s) Oral at bedtime  lactated ringers. 1000 milliLiter(s) (80 mL/Hr) IV Continuous <Continuous>  losartan 50 milliGRAM(s) Oral daily  memantine 10 milliGRAM(s) Oral two times a day  potassium phosphate / sodium phosphate Powder (PHOS-NaK) 2 Packet(s) Oral every 4 hours  sertraline 100 milliGRAM(s) Oral daily  sodium phosphate IVPB 15 milliMole(s) IV Intermittent once    MEDICATIONS  (PRN):  acetaminophen   Tablet .. 650 milliGRAM(s) Oral every 6 hours PRN Moderate Pain (4 - 6)      Vital Signs Last 24 Hrs  T(C): 36.4 (04 Oct 2020 09:46), Max: 37.1 (04 Oct 2020 04:51)  T(F): 97.5 (04 Oct 2020 09:46), Max: 98.7 (04 Oct 2020 04:51)  HR: 70 (04 Oct 2020 09:46) (66 - 80)  BP: 117/66 (04 Oct 2020 09:46) (117/66 - 157/74)  BP(mean): --  RR: 18 (04 Oct 2020 09:46) (18 - 19)  SpO2: 95% (04 Oct 2020 09:46) (91% - 95%)  ICU Vital Signs Last 24 Hrs  GENOJOSE VERA  I&O's Detail    03 Oct 2020 07:01  -  04 Oct 2020 07:00  --------------------------------------------------------  IN:    Lactated Ringers: 480 mL  Total IN: 480 mL    OUT:    Voided (mL): 600 mL  Total OUT: 600 mL    Total NET: -120 mL        I&O's Summary    03 Oct 2020 07:01  -  04 Oct 2020 07:00  --------------------------------------------------------  IN: 480 mL / OUT: 600 mL / NET: -120 mL      Drug Dosing Weight  GENO VERA        PHYSICAL EXAM:  General: Appears well developed, well nourished alert and cooperative.  HEENT: Head; normocephalic, atraumatic.  Eyes: Pupils reactive, cornea wnl.  Neck: Supple, no nodes adenopathy, no NVD or carotid bruit or thyromegaly.  CARDIOVASCULAR: 3/6 NIR at RUSB, no rub, gallop or lift. Normal S1 and S2.  LUNGS: No rales, rhonchi or wheeze. Normal breath sounds bilaterally.  ABDOMEN: Soft, nontender without mass or organomegaly. bowel sounds normoactive.  EXTREMITIES: No clubbing, cyanosis or edema. Distal pulses wnl.   SKIN: warm and dry with normal turgor.  NEURO: Alert/oriented x 1    PSYCH: confused        LABS:                        12.0   8.26  )-----------( 108      ( 04 Oct 2020 06:06 )             35.7     10-    139  |  104  |  20.0  ----------------------------<  101<H>  3.7   |  23.0  |  0.78    Ca    9.3      04 Oct 2020 06:06  Phos  1.9     10-04  Mg     2.0     10-04      GENO VERA      PT/INR - ( 04 Oct 2020 06:06 )   PT: 12.7 sec;   INR: 1.10 ratio         PTT - ( 04 Oct 2020 06:06 )  PTT:28.4 sec  Urinalysis Basic - ( 04 Oct 2020 10:13 )    Color: Yellow / Appearance: Slightly Turbid / S.010 / pH: x  Gluc: x / Ketone: Trace  / Bili: Negative / Urobili: 4 mg/dL   Blood: x / Protein: 30 mg/dL / Nitrite: Negative   Leuk Esterase: Moderate / RBC: 3-5 /HPF / WBC >50   Sq Epi: x / Non Sq Epi: Occasional / Bacteria: Many        RADIOLOGY & ADDITIONAL STUDIES:    INTERPRETATION OF TELEMETRY (personally reviewed):    ECG:    ECHO:    STRESS TEST:    CARDIAC CATHETERIZATION:    ASSESSMENT AND PLAN:  In summary, GENO VERA is an 85y Female with past medical history significant for dementia, severe AS, nonobstructive CAD, PVD presents with fall found to have hip fx.    Preoperative risk assessment  Patient is a high risk for surgery.  She currently has an active cardiac contraindication with severe aortic stenosis.  If patient orthopedic surgery deems it appropriate to perform surgery under local anesthesia with less invasive procedure, she might tolerate it hemodynamically.  She would be high risk for general anesthesia.    Would recommend BAV prior to surgery to bridge her through the surgery and then TAVR workup afterwards.  I will speak to the daughter about potential for BAV on 10/5    HLD  Continue atorvastatin 20mg qhs    HTN  Continue losartan and amlodipine    Thank you for allowing [facility] to participate in the care of this patient.  Please feel free to call with any questions or concerns.

## 2020-10-04 NOTE — PROGRESS NOTE ADULT - ASSESSMENT
A/P: 84 y/o F w/multiple medical comorbidities (including Alzheimer's, Parkinson's, and AS) admitted s/p fall and R femoral neck fx. Ortho planning for arthroplasty once medically stable per Cards.    -Tentatively planning for balloon valvuloplasty on Monday, 10/5 w/Cards (Dr. Guzman following)  -Strict NWB of RLE  -1-to-1 sitter  -F/U Ortho for any additional planning

## 2020-10-05 LAB
ANION GAP SERPL CALC-SCNC: 13 MMOL/L — SIGNIFICANT CHANGE UP (ref 5–17)
ANION GAP SERPL CALC-SCNC: 13 MMOL/L — SIGNIFICANT CHANGE UP (ref 5–17)
BASOPHILS # BLD AUTO: 0.04 K/UL — SIGNIFICANT CHANGE UP (ref 0–0.2)
BASOPHILS NFR BLD AUTO: 0.7 % — SIGNIFICANT CHANGE UP (ref 0–2)
BLD GP AB SCN SERPL QL: SIGNIFICANT CHANGE UP
BUN SERPL-MCNC: 17 MG/DL — SIGNIFICANT CHANGE UP (ref 8–20)
BUN SERPL-MCNC: 17 MG/DL — SIGNIFICANT CHANGE UP (ref 8–20)
CALCIUM SERPL-MCNC: 8.9 MG/DL — SIGNIFICANT CHANGE UP (ref 8.6–10.2)
CALCIUM SERPL-MCNC: 9.4 MG/DL — SIGNIFICANT CHANGE UP (ref 8.6–10.2)
CHLORIDE SERPL-SCNC: 104 MMOL/L — SIGNIFICANT CHANGE UP (ref 98–107)
CHLORIDE SERPL-SCNC: 106 MMOL/L — SIGNIFICANT CHANGE UP (ref 98–107)
CO2 SERPL-SCNC: 23 MMOL/L — SIGNIFICANT CHANGE UP (ref 22–29)
CO2 SERPL-SCNC: 24 MMOL/L — SIGNIFICANT CHANGE UP (ref 22–29)
CREAT SERPL-MCNC: 0.81 MG/DL — SIGNIFICANT CHANGE UP (ref 0.5–1.3)
CREAT SERPL-MCNC: 0.87 MG/DL — SIGNIFICANT CHANGE UP (ref 0.5–1.3)
EOSINOPHIL # BLD AUTO: 0.38 K/UL — SIGNIFICANT CHANGE UP (ref 0–0.5)
EOSINOPHIL NFR BLD AUTO: 6.2 % — HIGH (ref 0–6)
GLUCOSE SERPL-MCNC: 112 MG/DL — HIGH (ref 70–99)
GLUCOSE SERPL-MCNC: 97 MG/DL — SIGNIFICANT CHANGE UP (ref 70–99)
HCT VFR BLD CALC: 33 % — LOW (ref 34.5–45)
HCT VFR BLD CALC: 38.2 % — SIGNIFICANT CHANGE UP (ref 34.5–45)
HGB BLD-MCNC: 11 G/DL — LOW (ref 11.5–15.5)
HGB BLD-MCNC: 12.9 G/DL — SIGNIFICANT CHANGE UP (ref 11.5–15.5)
IMM GRANULOCYTES NFR BLD AUTO: 0.3 % — SIGNIFICANT CHANGE UP (ref 0–1.5)
LYMPHOCYTES # BLD AUTO: 1.1 K/UL — SIGNIFICANT CHANGE UP (ref 1–3.3)
LYMPHOCYTES # BLD AUTO: 18 % — SIGNIFICANT CHANGE UP (ref 13–44)
MAGNESIUM SERPL-MCNC: 1.9 MG/DL — SIGNIFICANT CHANGE UP (ref 1.6–2.6)
MAGNESIUM SERPL-MCNC: 2.6 MG/DL — SIGNIFICANT CHANGE UP (ref 1.6–2.6)
MCHC RBC-ENTMCNC: 29.1 PG — SIGNIFICANT CHANGE UP (ref 27–34)
MCHC RBC-ENTMCNC: 29.6 PG — SIGNIFICANT CHANGE UP (ref 27–34)
MCHC RBC-ENTMCNC: 33.3 GM/DL — SIGNIFICANT CHANGE UP (ref 32–36)
MCHC RBC-ENTMCNC: 33.8 GM/DL — SIGNIFICANT CHANGE UP (ref 32–36)
MCV RBC AUTO: 87.3 FL — SIGNIFICANT CHANGE UP (ref 80–100)
MCV RBC AUTO: 87.6 FL — SIGNIFICANT CHANGE UP (ref 80–100)
MONOCYTES # BLD AUTO: 0.93 K/UL — HIGH (ref 0–0.9)
MONOCYTES NFR BLD AUTO: 15.2 % — HIGH (ref 2–14)
NEUTROPHILS # BLD AUTO: 3.65 K/UL — SIGNIFICANT CHANGE UP (ref 1.8–7.4)
NEUTROPHILS NFR BLD AUTO: 59.6 % — SIGNIFICANT CHANGE UP (ref 43–77)
PHOSPHATE SERPL-MCNC: 3.1 MG/DL — SIGNIFICANT CHANGE UP (ref 2.4–4.7)
PLATELET # BLD AUTO: 108 K/UL — LOW (ref 150–400)
PLATELET # BLD AUTO: 148 K/UL — LOW (ref 150–400)
POTASSIUM SERPL-MCNC: 3.4 MMOL/L — LOW (ref 3.5–5.3)
POTASSIUM SERPL-MCNC: 4.1 MMOL/L — SIGNIFICANT CHANGE UP (ref 3.5–5.3)
POTASSIUM SERPL-SCNC: 3.4 MMOL/L — LOW (ref 3.5–5.3)
POTASSIUM SERPL-SCNC: 4.1 MMOL/L — SIGNIFICANT CHANGE UP (ref 3.5–5.3)
RBC # BLD: 3.78 M/UL — LOW (ref 3.8–5.2)
RBC # BLD: 4.36 M/UL — SIGNIFICANT CHANGE UP (ref 3.8–5.2)
RBC # FLD: 12.9 % — SIGNIFICANT CHANGE UP (ref 10.3–14.5)
RBC # FLD: 13.2 % — SIGNIFICANT CHANGE UP (ref 10.3–14.5)
SODIUM SERPL-SCNC: 141 MMOL/L — SIGNIFICANT CHANGE UP (ref 135–145)
SODIUM SERPL-SCNC: 141 MMOL/L — SIGNIFICANT CHANGE UP (ref 135–145)
WBC # BLD: 6.12 K/UL — SIGNIFICANT CHANGE UP (ref 3.8–10.5)
WBC # BLD: 6.74 K/UL — SIGNIFICANT CHANGE UP (ref 3.8–10.5)
WBC # FLD AUTO: 6.12 K/UL — SIGNIFICANT CHANGE UP (ref 3.8–10.5)
WBC # FLD AUTO: 6.74 K/UL — SIGNIFICANT CHANGE UP (ref 3.8–10.5)

## 2020-10-05 PROCEDURE — 93010 ELECTROCARDIOGRAM REPORT: CPT

## 2020-10-05 PROCEDURE — 99231 SBSQ HOSP IP/OBS SF/LOW 25: CPT

## 2020-10-05 PROCEDURE — 93306 TTE W/DOPPLER COMPLETE: CPT | Mod: 26

## 2020-10-05 PROCEDURE — 93308 TTE F-UP OR LMTD: CPT | Mod: 26,59

## 2020-10-05 PROCEDURE — 99232 SBSQ HOSP IP/OBS MODERATE 35: CPT

## 2020-10-05 RX ORDER — POTASSIUM CHLORIDE 20 MEQ
10 PACKET (EA) ORAL
Refills: 0 | Status: COMPLETED | OUTPATIENT
Start: 2020-10-05 | End: 2020-10-05

## 2020-10-05 RX ORDER — MAGNESIUM SULFATE 500 MG/ML
2 VIAL (ML) INJECTION ONCE
Refills: 0 | Status: COMPLETED | OUTPATIENT
Start: 2020-10-05 | End: 2020-10-05

## 2020-10-05 RX ORDER — HEPARIN SODIUM 5000 [USP'U]/ML
5000 INJECTION INTRAVENOUS; SUBCUTANEOUS EVERY 12 HOURS
Refills: 0 | Status: DISCONTINUED | OUTPATIENT
Start: 2020-10-06 | End: 2020-10-06

## 2020-10-05 RX ORDER — CHLORHEXIDINE GLUCONATE 213 G/1000ML
1 SOLUTION TOPICAL
Refills: 0 | Status: DISCONTINUED | OUTPATIENT
Start: 2020-10-05 | End: 2020-10-06

## 2020-10-05 RX ADMIN — AMLODIPINE BESYLATE 5 MILLIGRAM(S): 2.5 TABLET ORAL at 06:08

## 2020-10-05 RX ADMIN — Medication 50 GRAM(S): at 16:06

## 2020-10-05 RX ADMIN — Medication 3 MILLIGRAM(S): at 22:23

## 2020-10-05 RX ADMIN — Medication 100 MILLIEQUIVALENT(S): at 11:54

## 2020-10-05 RX ADMIN — CARBIDOPA AND LEVODOPA 1 TABLET(S): 25; 100 TABLET ORAL at 22:23

## 2020-10-05 RX ADMIN — LOSARTAN POTASSIUM 50 MILLIGRAM(S): 100 TABLET, FILM COATED ORAL at 06:08

## 2020-10-05 RX ADMIN — CARBIDOPA AND LEVODOPA 1 TABLET(S): 25; 100 TABLET ORAL at 06:08

## 2020-10-05 RX ADMIN — CHLORHEXIDINE GLUCONATE 1 APPLICATION(S): 213 SOLUTION TOPICAL at 18:06

## 2020-10-05 RX ADMIN — DONEPEZIL HYDROCHLORIDE 5 MILLIGRAM(S): 10 TABLET, FILM COATED ORAL at 22:27

## 2020-10-05 RX ADMIN — ATORVASTATIN CALCIUM 20 MILLIGRAM(S): 80 TABLET, FILM COATED ORAL at 22:33

## 2020-10-05 RX ADMIN — MEMANTINE HYDROCHLORIDE 10 MILLIGRAM(S): 10 TABLET ORAL at 18:06

## 2020-10-05 RX ADMIN — MEMANTINE HYDROCHLORIDE 10 MILLIGRAM(S): 10 TABLET ORAL at 06:08

## 2020-10-05 RX ADMIN — Medication 100 MILLIEQUIVALENT(S): at 10:26

## 2020-10-05 NOTE — PROGRESS NOTE ADULT - SUBJECTIVE AND OBJECTIVE BOX
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   She is difficult to orient however reports buttock/hip pain.      Pt presents to the [facility] area from inpatient unit ([address] [place] [address]) for BAV with Dr Juan Bhatt. A and O x1,   Pt had her sinemet, norvasc, cozaar, K+ supplementation, namenda this am    PRE-PROCEDURE ASSESSMENT    -NPO after midnight confirmed  -Patient seen and examined-pt has a large soft  erythematous mass-like lesion luis carlos-medial neck area  -Labs reviewed  -consent   -Questions answered    ASA-3  MALL-1  Creat-0.87  eGFR-61  BRA-2.6%    PAST MEDICAL & SURGICAL HISTORY:  Falls frequently  Parkinsonism, unspecified Parkinsonism type  Depression, unspecified depression type  Alzheimer&#x27;s dementia without behavioral disturbance, unspecified timing of dementia onset    No significant past surgical history      PREVIOUS DIAGNOSTIC TESTING:      EKG: 10/2/2020: Normal sinus rhythm with non-specific ST-T changes     ECHO FINDINGS:     CATHETERIZATION FINDINGS: mild CAD, severe AS     Patient is an 86 y/o woman with Parkinson's dementia with gait instability and recurrent fall, hypertension, dyslipidemia, mild non-obstructive CAD, severe AS pending consideration for possible TAVR, PVD (right iliac stenosis and possible chronic thrombus of the left iliac), precluding femoral approach for TAVR, who lives with 24 hour aides who presents post mechanical fall found to have a non-displaced impacted right subcapital femoral neck fracture for which she is pending consideration for operative repair.   She is difficult to orient however reports buttock/hip pain.      Pt presents to the [facility] area from inpatient unit ([address] [place] [address]) for BAV with Dr Juan Bhatt. A and O x1,   Pt had her sinemet, norvasc, cozaar, K+ supplementation, namenda this am    PRE-PROCEDURE ASSESSMENT    -NPO after midnight confirmed  -Patient seen and examined-pt has a large soft  erythematous mass-like lesion luis carlos-medial neck area  -Labs reviewed  -consent   -Questions answered    ASA-3  MALL-2  Creat-0.87  eGFR-61  BRA-2.6%    PAST MEDICAL & SURGICAL HISTORY:  Falls frequently  Parkinsonism, unspecified Parkinsonism type  Depression, unspecified depression type  Alzheimer&#x27;s dementia without behavioral disturbance, unspecified timing of dementia onset    No significant past surgical history      PREVIOUS DIAGNOSTIC TESTING:      EKG: 10/2/2020: Normal sinus rhythm with non-specific ST-T changes     ECHO FINDINGS:     CATHETERIZATION FINDINGS: mild CAD, severe AS     Patient is an 86 y/o woman with Parkinson's dementia with gait instability and recurrent fall, hypertension, dyslipidemia, mild non-obstructive CAD, severe AS pending consideration for possible TAVR, PVD (right iliac stenosis and possible chronic thrombus of the left iliac), precluding femoral approach for TAVR, who lives with 24 hour aides who presents post mechanical fall found to have a non-displaced impacted right subcapital femoral neck fracture for which she is pending consideration for operative repair.   She is difficult to orient however reports buttock/hip pain.      Pt presents to the [facility] area from inpatient unit ([address] [place] [address]) for BAV ( Balloon Aortic Valvuloplasty) with Dr Juan Bhatt. A and O x1,   Pt had her sinemet, norvasc, cozaar, K+ supplementation, namenda this am    PRE-PROCEDURE ASSESSMENT    -NPO after midnight confirmed  -Patient seen and examined-pt has a large soft  erythematous mass-like lesion luis carlos-medial neck area  -Labs reviewed  -consent   -Questions answered    ASA-3  MALL-2  Creat-0.87  eGFR-61  BRA-2.6%    PAST MEDICAL & SURGICAL HISTORY:  Falls frequently  Parkinsonism, unspecified Parkinsonism type  Depression, unspecified depression type  Alzheimer&#x27;s dementia without behavioral disturbance, unspecified timing of dementia onset    No significant past surgical history      PREVIOUS DIAGNOSTIC TESTING:      EKG: 10/2/2020: Normal sinus rhythm with non-specific ST-T changes     ECHO FINDINGS:     CATHETERIZATION FINDINGS: mild CAD, severe AS     Patient is an 84 y/o woman with Parkinson's dementia with gait instability and recurrent fall, hypertension, dyslipidemia, mild non-obstructive CAD, severe AS pending consideration for possible TAVR, PVD (right iliac stenosis and possible chronic thrombus of the left iliac), precluding femoral approach for TAVR, who lives with 24 hour aides who presents post mechanical fall found to have a non-displaced impacted right subcapital femoral neck fracture for which she is pending consideration for operative repair.   She is difficult to orient however reports buttock/hip pain.      Pt presents to the [facility] area from inpatient unit ([address] [place] [address]) for BAV ( Balloon Aorto Valvuloplasty) with Dr Juan Bhatt. A and O x1,   Pt had her sinemet, norvasc, cozaar, K+ supplementation, namenda this am    PRE-PROCEDURE ASSESSMENT    -NPO after midnight confirmed  -Patient seen and examined-pt has a large soft  erythematous mass-like lesion luis carlos-medial neck area  -Labs reviewed  -consent   -Questions answered    ASA-3  MALL-2  Creat-0.87  eGFR-61  BRA-2.6%    PAST MEDICAL & SURGICAL HISTORY:  Falls frequently  Parkinsonism, unspecified Parkinsonism type  Depression, unspecified depression type  Alzheimer&#x27;s dementia without behavioral disturbance, unspecified timing of dementia onset    No significant past surgical history      PREVIOUS DIAGNOSTIC TESTING:      EKG: 10/2/2020: Normal sinus rhythm with non-specific ST-T changes     ECHO FINDINGS:     CATHETERIZATION FINDINGS: mild CAD, severe AS     Patient is an 86 y/o woman with Parkinson's dementia with gait instability and recurrent fall, hypertension, dyslipidemia, mild non-obstructive CAD, severe AS pending consideration for possible TAVR, PVD (right iliac stenosis and possible chronic thrombus of the left iliac), precluding femoral approach for TAVR, who lives with 24 hour aides who presents post mechanical fall found to have a non-displaced impacted right subcapital femoral neck fracture for which she is pending consideration for operative repair.   She is difficult to orient however reports buttock/hip pain.      Pt presents to the [facility] area from inpatient unit ([address] [place] [address]) for BAV ( Balloon Aortic Valvuloplasty) with Dr Juan Bhatt. A and O x1,   Pt had her sinemet, norvasc, cozaar, K+ supplementation, namenda this am    PRE-PROCEDURE ASSESSMENT    -NPO after midnight confirmed  -Patient seen and examined-pt has a large soft  erythematous mass-like lesion luis carlos-medial neck area  -Labs reviewed  -consent   -Questions answered    ASA-3  MALL-2  Creat-0.87  eGFR-61  BRA-2.6%    PAST MEDICAL & SURGICAL HISTORY:  Falls frequently  Parkinsonism, unspecified Parkinsonism type  Depression, unspecified depression type  Alzheimer&#x27;s dementia without behavioral disturbance, unspecified timing of dementia onset    No significant past surgical history      PREVIOUS DIAGNOSTIC TESTING:      EKG: 10/2/2020: Normal sinus rhythm with non-specific ST-T changes     ECHO FINDINGS:     CATHETERIZATION FINDINGS: mild CAD, severe AS

## 2020-10-05 NOTE — PROGRESS NOTE ADULT - SUBJECTIVE AND OBJECTIVE BOX
Cardiology Nurse Practitioner Progress note:     s/p Balloon Aortic Valvuloplasty via #6LFA ( now with Perclose agent) and #5Fr LFV (sheath removed in Cath Lab and manual pressure applied) by Dr Juan Bhatt  Pt had episodes of WCT during procedure ( Mg and K supplementation in progress) and worsening AI-requests pt be transferred to ICU-spoke to MICU PA-report given-will come to cath holding to evaluate  Patient feels well.  Denies chest pain, shortness of breath, dizziness or palpitations at this time.        EKG:NSR with Inc LBBB HR -65, S-T and T-wave abnormality laterally  TELE: NSR with Inc LBBB HR 60s  GENERAL: appears comfortable, daughter @ bedside  CV: RRR, S1 S2, no murmur, rub, clicks or gallop noted  RESP: LCTA bilaterally, no wheeze, no rhonchi or crackles noted  GI/: soft, NT/ND  NEURO: AOx4, no focal deficits  EXTREMITIES: no edema, clubbing or cyanosis noted  PROCEDURE SITE:  Left groin dressing intact site CDI with no bleeding, no hematoma, area soft, non tender, positive pedal pulses bilaterally        T(C): 36.8 (10-05-20 @ 13:32), Max: 37.2 (10-05-20 @ 00:08)  HR: 65 (10-05-20 @ 16:15) (64 - 91)  BP: 152/67 (10-05-20 @ 16:15) (121/71 - 180/70)  RR: 17 (10-05-20 @ 16:15) (16 - 18)  SpO2: 95% (10-05-20 @ 16:15) (92% - 97%)  Wt(kg): --  CBC Full  -  ( 05 Oct 2020 05:35 )  WBC Count : 6.12 K/uL  RBC Count : 3.78 M/uL  Hemoglobin : 11.0 g/dL  Hematocrit : 33.0 %  Platelet Count - Automated : 108 K/uL  Mean Cell Volume : 87.3 fl  Mean Cell Hemoglobin : 29.1 pg  Mean Cell Hemoglobin Concentration : 33.3 gm/dL  Auto Neutrophil # : 3.65 K/uL  Auto Lymphocyte # : 1.10 K/uL  Auto Monocyte # : 0.93 K/uL  Auto Eosinophil # : 0.38 K/uL  Auto Basophil # : 0.04 K/uL  Auto Neutrophil % : 59.6 %  Auto Lymphocyte % : 18.0 %  Auto Monocyte % : 15.2 %  Auto Eosinophil % : 6.2 %  Auto Basophil % : 0.7 %    10-05    141  |  106  |  17.0  ----------------------------<  97  3.4<L>   |  23.0  |  0.87    Ca    8.9      05 Oct 2020 05:35  Phos  3.1     10-05  Mg     1.9     10-05              MEDICATIONS  (STANDING):  amLODIPine   Tablet 5 milliGRAM(s) Oral daily  atorvastatin 20 milliGRAM(s) Oral at bedtime  carbidopa/levodopa  25/100 1 Tablet(s) Oral three times a day  cholecalciferol 2000 Unit(s) Oral every 48 hours  donepezil 5 milliGRAM(s) Oral daily  donepezil 5 milliGRAM(s) Oral at bedtime  lactated ringers. 1000 milliLiter(s) (80 mL/Hr) IV Continuous <Continuous>  losartan 50 milliGRAM(s) Oral daily  melatonin 3 milliGRAM(s) Oral at bedtime  memantine 10 milliGRAM(s) Oral two times a day  potassium chloride  10 mEq/100 mL IVPB 10 milliEquivalent(s) IV Intermittent every 1 hour  sertraline 100 milliGRAM(s) Oral daily    MEDICATIONS  (PRN):  acetaminophen   Tablet .. 650 milliGRAM(s) Oral every 6 hours PRN Moderate Pain (4 - 6)    INDICATIONS: Aortic valve insufficiency.  HISTORY: The patient has hypertension.  PRIOR DIAGNOSTIC TEST RESULTS: Echocardiography: severe aortic valve  stenosis.  PROCEDURE:  --  Left heart catheterization.  --  Sonosite.  --  Temporary Pacemaker.  --  Valvuloplasty.  --  Hemostasis with Perclose-Intervention.  --  Hemostasis with Perclose-Intervention.  Local anesthetic given. Left femoral vein access. Left femoral artery  access. Sheath exchange. The sheath in the left femoral artery was  exchanged. Left heart catheterization. Sonosite. Temporary Pacemaker.  RADIATION EXPOSURE: 7.5 min. Valvuloplasty. Hemostasis with  Perclose-Intervention. Hemostasis with Perclose-Intervention.  CONTRAST GIVEN: Omnipaque 19 ml. Omnipaque 10 ml.  MEDICATIONS GIVEN: Midazolam, 0.5 mg, IV. Fentanyl, 25 mcg, IV. Labetalol  (Trandate), 20 mg, IV. Heparin, 7000 units, IV. Protamine sulfate, 20 mg,  IV. 1% Lidocaine, 10 ml, subcutaneously. Neosynephrine (Phenylephrine),  100 mcg, IV. 0.9NS, 50 ml, IV.  COMPLICATIONS: There were no complications. No complications occurred  during the cath lab visit. No complications occurred during the cath lab  visit.  DIAGNOSTIC IMPRESSIONS: BAV performed using 21 mm balloon with rapid  ventricular pacing. The mean aortic gradient dropped from 18 mm Hg to 11  mm Hg.  DIAGNOSTIC RECOMMENDATIONS: echo  INTERVENTIONAL IMPRESSIONS: BAV performed using 21mm balloon with rapid  ventricular pacing. The mean aortic gradient dropped from 18 mm Hg to 11  mm Hg.  INTERVENTIONAL RECOMMENDATIONS: echo  Prepared and signed by  Juan Bhatt MD  `    Date of Exam:        10/5/2020 3:16:54 PM  Sonographer:         Tiffany Morris  Referring Physician: Juan Bhatt MD  Copy report sent to: Eligio Alcala    Procedure:     Follow up or Limited Echocardiogram.  Indications:   Abnormalelectrocardiogram [ECG] [EKG] - R94.31  Diagnosis:     Abnormal electrocardiogram [ECG] [EKG] - R94.31  Study Details: Technically adequate study. Study quality was adversely affected                 due to portable.    SPECTRAL DOPPLER ANALYSIS (where applicable):  Aortic Insufficiency:  AI Half-time: 412 msec      PHYSICIAN INTERPRETATION:  Left Ventricle:  Global LV systolic function was normal.  Pericardium: There is no evidence of pericardial effusion.  Aortic Valve: Sclerotic aortic valve with decreased opening. Moderate to severe aortic valve regurgitation is seen.      Summary:   1. Normal global left ventricular systolic function.   2. There is no evidence of pericardial effusion.   3. Moderate to severe aortic regurgitation.   4. Sclerotic aortic valve with decreased opening.      HPI:  A 86 yo F with h/o Alzheimer's who lives alone w/ 24/7 nursing aid fell 1 day ago. She normally mobilizes with walker and requires assistance for daily living. She was c/o pain to daughter and unable to bare weight. The rest of HPI was taken from daughter provided patient has Alz and PD and was slightly confused. Daughter denies that patient hit her head or had external signs of bleeding, takes no antiplatelet or anticoagulation therapy. Denies CP, SOB, vertigo, or tinnitus.  (03 Oct 2020 00:41)    s/p Balloon Aortic Valvuloplasty via #6LFA ( now with Perclose agent) and #5Fr LFV (sheath removed in Cath Lab and manual pressure applied) by Dr Juan Bhatt (Report enclosed)    ASSESSMENT/PLAN:    -Transfer pt to MICU as per Dr Bhatt secondary to WCT during procedure and worsening AI-repor given to MICU PA  Left groin precautions/ protocol  HOB @ 30 degrees @ 5PM  -labs, EKG and procedure site assessed  -Plan of care discussed with patient, daughter @ bedside  -continue medical management of BP  -optimize K and Mg

## 2020-10-05 NOTE — CONSULT NOTE ADULT - ATTENDING COMMENTS
Patient seen and examined at bedside this afternoon. Daughter present at bedside. Pain controlled, resting in bed. Exam as noted above. Patient's CT scan demonstrates a displaced femoral neck fracture. Plan for hemiarthroplasty when cleared by cardiology. Patient's daughter verbalizes understanding and agrees with the plan. All questions answered to her satisfaction.
Patient seen and examined independently.  Admitted to MICU for monitoring post BAV.  Currently hemodynamically stable.  Awake and alert but confused.  High risk of delirium given underlying dementia, medical condition, and unfamiliar environment.

## 2020-10-05 NOTE — CONSULT NOTE ADULT - SUBJECTIVE AND OBJECTIVE BOX
Patient is a 85y old  Female who presents with a chief complaint of blunt trauma, fall from standing, r femoral neck fx (05 Oct 2020 16:27)      BRIEF HOSPITAL COURSE: ***    Events last 24 hours: ***    PAST MEDICAL & SURGICAL HISTORY:  Falls frequently    Parkinsonism, unspecified Parkinsonism type    Depression, unspecified depression type    Alzheimer&#x27;s dementia without behavioral disturbance, unspecified timing of dementia onset    Cholesterolosis    No significant past surgical history      Allergies    No Known Allergies    Intolerances      FAMILY HISTORY:    SOCIAL HISTORY:     Review of Systems:  CONSTITUTIONAL: No fever, chills, or fatigue  EYES: No eye pain, visual disturbances, or discharge  ENMT:  No difficulty hearing, tinnitus, vertigo; No sinus or throat pain  NECK: No pain or stiffness  RESPIRATORY: No cough, wheezing, chills or hemoptysis; No shortness of breath  CARDIOVASCULAR: No chest pain, palpitations, dizziness, or leg swelling  GASTROINTESTINAL: No abdominal or epigastric pain. No nausea, vomiting, or hematemesis; No diarrhea or constipation. No melena or hematochezia.  GENITOURINARY: No dysuria, frequency, hematuria, or incontinence  NEUROLOGICAL: No headaches, memory loss, loss of strength, numbness, or tremors  SKIN: No itching, burning, rashes, or lesions   MUSCULOSKELETAL: No joint pain or swelling; No muscle, back, or extremity pain  PSYCHIATRIC: No depression, anxiety, mood swings, or difficulty sleeping      Medications:    amLODIPine   Tablet 5 milliGRAM(s) Oral daily  losartan 50 milliGRAM(s) Oral daily      acetaminophen   Tablet .. 650 milliGRAM(s) Oral every 6 hours PRN  carbidopa/levodopa  25/100 1 Tablet(s) Oral three times a day  donepezil 5 milliGRAM(s) Oral daily  donepezil 5 milliGRAM(s) Oral at bedtime  melatonin 3 milliGRAM(s) Oral at bedtime  memantine 10 milliGRAM(s) Oral two times a day  sertraline 100 milliGRAM(s) Oral daily            atorvastatin 20 milliGRAM(s) Oral at bedtime    cholecalciferol 2000 Unit(s) Oral every 48 hours  lactated ringers. 1000 milliLiter(s) IV Continuous <Continuous>  potassium chloride  10 mEq/100 mL IVPB 10 milliEquivalent(s) IV Intermittent every 1 hour                ICU Vital Signs Last 24 Hrs  T(C): 36.8 (05 Oct 2020 13:32), Max: 37.2 (05 Oct 2020 00:08)  T(F): 98.3 (05 Oct 2020 13:32), Max: 99 (05 Oct 2020 00:08)  HR: 67 (05 Oct 2020 16:30) (64 - 91)  BP: 151/67 (05 Oct 2020 16:30) (121/71 - 180/70)  BP(mean): --  ABP: --  ABP(mean): --  RR: 16 (05 Oct 2020 16:30) (16 - 18)  SpO2: 94% (05 Oct 2020 16:30) (92% - 97%)    Vital Signs Last 24 Hrs  T(C): 36.8 (05 Oct 2020 13:32), Max: 37.2 (05 Oct 2020 00:08)  T(F): 98.3 (05 Oct 2020 13:32), Max: 99 (05 Oct 2020 00:08)  HR: 67 (05 Oct 2020 16:30) (64 - 91)  BP: 151/67 (05 Oct 2020 16:30) (121/71 - 180/70)  BP(mean): --  RR: 16 (05 Oct 2020 16:30) (16 - 18)  SpO2: 94% (05 Oct 2020 16:30) (92% - 97%)        I&O's Detail    04 Oct 2020 07:01  -  05 Oct 2020 07:00  --------------------------------------------------------  IN:    Lactated Ringers: 960 mL    Oral Fluid: 240 mL  Total IN: 1200 mL    OUT:  Total OUT: 0 mL    Total NET: 1200 mL            LABS:                        11.0   6.12  )-----------( 108      ( 05 Oct 2020 05:35 )             33.0     10-05    141  |  106  |  17.0  ----------------------------<  97  3.4<L>   |  23.0  |  0.87    Ca    8.9      05 Oct 2020 05:35  Phos  3.1     10-05  Mg     1.9     10-05            CAPILLARY BLOOD GLUCOSE        PT/INR - ( 04 Oct 2020 06:06 )   PT: 12.7 sec;   INR: 1.10 ratio         PTT - ( 04 Oct 2020 06:06 )  PTT:28.4 sec  Urinalysis Basic - ( 04 Oct 2020 10:13 )    Color: Yellow / Appearance: Slightly Turbid / S.010 / pH: x  Gluc: x / Ketone: Trace  / Bili: Negative / Urobili: 4 mg/dL   Blood: x / Protein: 30 mg/dL / Nitrite: Negative   Leuk Esterase: Moderate / RBC: 3-5 /HPF / WBC >50   Sq Epi: x / Non Sq Epi: Occasional / Bacteria: Many      CULTURES:      Physical Examination:    VITALS AT TIME OF EXAM:  HR:  BP:  RR:  SPO2:    General: Well appearing, lying in bed in NAD      HEENT: Pupils equal, reactive to light.  Symmetric. No scleral icterus or injection    PULM: Clear to auscultation bilaterally, no wheezes, rales or rhonchi, no significant sputum production or increased respiratory effort    NECK: Supple, no lymphadenopathy, trachea midline    CVS: Regular rate and rhythm, no murmurs, +s1/s2    ABD: Soft, nondistended, nontender, normoactive bowel sounds    EXT: No edema, nontender    SKIN: Warm and well perfused    NEURO: Alert, oriented, interactive, nonfocal    DEVICES:   LINES:  KAY:    POCUS:    RADIOLOGY: ***    CRITICAL CARE TIME SPENT: ***   Patient is a 85y old  Female who presents with a chief complaint of blunt trauma, fall from standing, r femoral neck fx (05 Oct 2020 16:27)      BRIEF HOSPITAL COURSE:   Pt is an 84 y/o female with PMHx of HTN, HLD, Alzheimers dementia, severe AS, nonobstructive mild CAD and PVD presents from home s/p fall. Pt found to have right femoral neck fracture. In setting of severe AS, pt was recommended BAV prior to orthopedic surgery. Also planned for eventual TAVR.     Now s/p balloon valvuloplasty, procedure c/b episodes of wide complex tachyarrhythmia w/ worsening aortic insufficiency. Cath team requesting medical ICU monitoring overnight.     PAST MEDICAL & SURGICAL HISTORY:  Falls frequently    Parkinsonism, unspecified Parkinsonism type    Depression, unspecified depression type    Alzheimer&#x27;s dementia without behavioral disturbance, unspecified timing of dementia onset    Cholesterolosis    No significant past surgical history      Allergies    No Known Allergies    Intolerances      FAMILY HISTORY: No pertinent hx reported    SOCIAL HISTORY: Denies tobacco, alcohol or illicit drug use     Review of Systems:  CONSTITUTIONAL: No fever, chills, or fatigue  EYES: No eye pain, visual disturbances, or discharge  ENMT:  No difficulty hearing, tinnitus, vertigo; No sinus or throat pain  NECK: No pain or stiffness  RESPIRATORY: No cough, wheezing, chills or hemoptysis; No shortness of breath  CARDIOVASCULAR: No chest pain, palpitations, dizziness, or leg swelling  GASTROINTESTINAL: No abdominal or epigastric pain. No nausea, vomiting, or hematemesis; No diarrhea or constipation. No melena or hematochezia.  GENITOURINARY: No dysuria, frequency, hematuria, or incontinence  NEUROLOGICAL: No headaches, memory loss, loss of strength, numbness, or tremors  SKIN: No itching, burning, rashes, or lesions   MUSCULOSKELETAL: No joint pain or swelling; No muscle, back, or extremity pain  PSYCHIATRIC: No depression, anxiety, mood swings, or difficulty sleeping      Medications:    amLODIPine   Tablet 5 milliGRAM(s) Oral daily  losartan 50 milliGRAM(s) Oral daily      acetaminophen   Tablet .. 650 milliGRAM(s) Oral every 6 hours PRN  carbidopa/levodopa  25/100 1 Tablet(s) Oral three times a day  donepezil 5 milliGRAM(s) Oral daily  donepezil 5 milliGRAM(s) Oral at bedtime  melatonin 3 milliGRAM(s) Oral at bedtime  memantine 10 milliGRAM(s) Oral two times a day  sertraline 100 milliGRAM(s) Oral daily            atorvastatin 20 milliGRAM(s) Oral at bedtime    cholecalciferol 2000 Unit(s) Oral every 48 hours  lactated ringers. 1000 milliLiter(s) IV Continuous <Continuous>  potassium chloride  10 mEq/100 mL IVPB 10 milliEquivalent(s) IV Intermittent every 1 hour                ICU Vital Signs Last 24 Hrs  T(C): 36.8 (05 Oct 2020 13:32), Max: 37.2 (05 Oct 2020 00:08)  T(F): 98.3 (05 Oct 2020 13:32), Max: 99 (05 Oct 2020 00:08)  HR: 67 (05 Oct 2020 16:30) (64 - 91)  BP: 151/67 (05 Oct 2020 16:30) (121/71 - 180/70)  BP(mean): --  ABP: --  ABP(mean): --  RR: 16 (05 Oct 2020 16:30) (16 - 18)  SpO2: 94% (05 Oct 2020 16:30) (92% - 97%)    Vital Signs Last 24 Hrs  T(C): 36.8 (05 Oct 2020 13:32), Max: 37.2 (05 Oct 2020 00:08)  T(F): 98.3 (05 Oct 2020 13:32), Max: 99 (05 Oct 2020 00:08)  HR: 67 (05 Oct 2020 16:30) (64 - 91)  BP: 151/67 (05 Oct 2020 16:30) (121/71 - 180/70)  BP(mean): --  RR: 16 (05 Oct 2020 16:30) (16 - 18)  SpO2: 94% (05 Oct 2020 16:30) (92% - 97%)        I&O's Detail    04 Oct 2020 07:01  -  05 Oct 2020 07:00  --------------------------------------------------------  IN:    Lactated Ringers: 960 mL    Oral Fluid: 240 mL  Total IN: 1200 mL    OUT:  Total OUT: 0 mL    Total NET: 1200 mL            LABS:                        11.0   6.12  )-----------( 108      ( 05 Oct 2020 05:35 )             33.0     10-05    141  |  106  |  17.0  ----------------------------<  97  3.4<L>   |  23.0  |  0.87    Ca    8.9      05 Oct 2020 05:35  Phos  3.1     10-05  Mg     1.9     10-05            CAPILLARY BLOOD GLUCOSE        PT/INR - ( 04 Oct 2020 06:06 )   PT: 12.7 sec;   INR: 1.10 ratio         PTT - ( 04 Oct 2020 06:06 )  PTT:28.4 sec  Urinalysis Basic - ( 04 Oct 2020 10:13 )    Color: Yellow / Appearance: Slightly Turbid / S.010 / pH: x  Gluc: x / Ketone: Trace  / Bili: Negative / Urobili: 4 mg/dL   Blood: x / Protein: 30 mg/dL / Nitrite: Negative   Leuk Esterase: Moderate / RBC: 3-5 /HPF / WBC >50   Sq Epi: x / Non Sq Epi: Occasional / Bacteria: Many      CULTURES:N      Physical Examination:    VITALS AT TIME OF EXAM:  HR: 70s nsr  BP: 151/89  RR: 14  SPO2: 100% RA    General: Well appearing, lying in bed in NAD      HEENT: Pupils equal, reactive to light.  Symmetric. No scleral icterus or injection    PULM: Clear to auscultation bilaterally, no wheezes, rales or rhonchi, no significant sputum production or increased respiratory effort    NECK: Supple, no lymphadenopathy, trachea midline    CVS: Regular rate and rhythm, no murmurs, +s1/s2    ABD: Soft, nondistended, nontender, normoactive bowel sounds    EXT: No edema, nontender, groin site clean    SKIN: Warm and well perfused    NEURO: Alert, oriented x1, interactive, nonfocal    DEVICES: N  LINES: N  KAY: N    POCUS:N    RADIOLOGY: < from: CT Pelvis Bony Only No Cont (10.02.20 @ 17:36) >  Impression:    Acute impacted right subcapital femoral neck fracture.    Nonspecific cystic lesions within the bilateral ovaries adrenal to 4.3 cm for which further evaluation with pelvic ultrasound is recommended.    Findings discussed with Dr. Rtoh    < end of copied text >

## 2020-10-05 NOTE — PROGRESS NOTE ADULT - SUBJECTIVE AND OBJECTIVE BOX
HPI/OVERNIGHT EVENTS: Patient seen and examined at bedside. She is awake and alert, oriented to self. 1:1 at bedside. Patient states she is experiencing pain to RLE. Remainder of subjective exam is limited as patient w/ known hx of dementia. Unable to reliably state if she is experiencing any other complaints at this time. When prompted patient does deny SOB, CP, or numbness/tingling to affected extremity, however reliability of this information is limited.    MEDICATIONS  (STANDING):  amLODIPine   Tablet 5 milliGRAM(s) Oral daily  atorvastatin 20 milliGRAM(s) Oral at bedtime  carbidopa/levodopa  25/100 1 Tablet(s) Oral three times a day  cholecalciferol 2000 Unit(s) Oral every 48 hours  donepezil 5 milliGRAM(s) Oral daily  donepezil 5 milliGRAM(s) Oral at bedtime  lactated ringers. 1000 milliLiter(s) (80 mL/Hr) IV Continuous <Continuous>  losartan 50 milliGRAM(s) Oral daily  melatonin 3 milliGRAM(s) Oral at bedtime  memantine 10 milliGRAM(s) Oral two times a day  sertraline 100 milliGRAM(s) Oral daily    MEDICATIONS  (PRN):  acetaminophen   Tablet .. 650 milliGRAM(s) Oral every 6 hours PRN Moderate Pain (4 - 6)      Vital Signs Last 24 Hrs  T(C): 37.2 (05 Oct 2020 00:08), Max: 37.2 (05 Oct 2020 00:08)  T(F): 99 (05 Oct 2020 00:08), Max: 99 (05 Oct 2020 00:08)  HR: 74 (05 Oct 2020 00:08) (66 - 91)  BP: 121/71 (05 Oct 2020 00:08) (117/66 - 153/76)  BP(mean): --  RR: 18 (05 Oct 2020 00:08) (18 - 18)  SpO2: 93% (05 Oct 2020 00:08) (91% - 95%)    Constitutional: patient appears comfortable lying in bed, in no acute distress, calm & cooperative w/ exam  HEENT: EOMI / PERRL b/l, no active drainage or redness  Neck: No JVD, ROM without pain  Respiratory: respirations are unlabored, no accessory muscle use, no conversational dyspnea  Cardiovascular: regular rate & rhythm  Gastrointestinal: abdomen soft, non-tender, non-distended, no rebound tenderness / guarding  Neurological: pleasantly demented, oriented to self only  Musculoskeletal: RLE is non-tender, compartments soft, able to move toes without difficulty, RLE is warm w/ brisk cap refill, 2+ DP pulse      I&O's Detail    03 Oct 2020 07:01  -  04 Oct 2020 07:00  --------------------------------------------------------  IN:    Lactated Ringers: 480 mL  Total IN: 480 mL    OUT:    Voided (mL): 600 mL  Total OUT: 600 mL    Total NET: -120 mL          LABS:                        12.0   8.26  )-----------( 108      ( 04 Oct 2020 06:06 )             35.7     10-04    139  |  104  |  20.0  ----------------------------<  101<H>  3.7   |  23.0  |  0.78    Ca    9.3      04 Oct 2020 06:06  Phos  1.9     10-04  Mg     2.0     10-04      PT/INR - ( 04 Oct 2020 06:06 )   PT: 12.7 sec;   INR: 1.10 ratio         PTT - ( 04 Oct 2020 06:06 )  PTT:28.4 sec  Urinalysis Basic - ( 04 Oct 2020 10:13 )    Color: Yellow / Appearance: Slightly Turbid / S.010 / pH: x  Gluc: x / Ketone: Trace  / Bili: Negative / Urobili: 4 mg/dL   Blood: x / Protein: 30 mg/dL / Nitrite: Negative   Leuk Esterase: Moderate / RBC: 3-5 /HPF / WBC >50   Sq Epi: x / Non Sq Epi: Occasional / Bacteria: Many

## 2020-10-05 NOTE — PROGRESS NOTE ADULT - SUBJECTIVE AND OBJECTIVE BOX
Pt being followed for a right fem neck fracture.  Awaiting surgical intervention.  Pt has pain to right LE with movement.   Going for cardiac procedure today    Vital Signs Last 24 Hrs  T(C): 36.4 (05 Oct 2020 07:10), Max: 37.2 (05 Oct 2020 00:08)  T(F): 97.6 (05 Oct 2020 07:10), Max: 99 (05 Oct 2020 00:08)  HR: 65 (05 Oct 2020 07:10) (65 - 91)  BP: 125/67 (05 Oct 2020 07:10) (117/66 - 153/76)  BP(mean): --  RR: 18 (05 Oct 2020 07:10) (18 - 18)  SpO2: 93% (05 Oct 2020 07:10) (92% - 95%)    Pt lying in bed NAD confused nursing at bedside  RLE calf soft NT  dorsiflexion +, sensation intact   pulses palp  ROM right hip not tested secondary to pain                          11.0   6.12  )-----------( 108      ( 05 Oct 2020 05:35 )             33.0   10-05    141  |  106  |  17.0  ----------------------------<  97  3.4<L>   |  23.0  |  0.87    Ca    8.9      05 Oct 2020 05:35  Phos  3.1     10-05  Mg     1.9     10-05    A/P:  Right femoral neck fx  - pain control   - NWB RLE  -plan for OR with Dr. Mojica on Wednesday

## 2020-10-05 NOTE — PROGRESS NOTE ADULT - SUBJECTIVE AND OBJECTIVE BOX
[facility]                                   [address] [place] [address]                                                      PHONE: [phone]                                                         FAX: [phone]  [email or web address]  ---------------------------------------------------------------------------------------------------------------------------------    Please see cath report.    BAV performed using 21 mm balloon using rapid vent pacing.   Mean gradient dropped from 18 mm Hg to 11 mm Hg.   Will follow echo      Juan Bhatt MD    Office [phone]  Cell     [phone]

## 2020-10-05 NOTE — PROGRESS NOTE ADULT - ASSESSMENT
86 y/o female w/ PMH dementia, parkinsons dz, severe aortic stenosis, now s/p fall sustaining R femoral neck fracture. Ortho planning for operative intervention however pt is high risk from cardiovascular perspective due to severe aortic stenosis  - Ortho recommendations appreciated - plan for operative fixation is pending optimization  - Cardiology planning on balloon valvuloplasty prior to procedure (today 10/5) then TAVR work-up afterward  - Continue 1:1 constant observation  - Keep NPO p MN w/ IVF for cardiology procedure today  - Pt to remain on bedrest / NWB to RLE until OR w/ ortho at which time wt bearing status will be readressed  - Pain control w/ tylenol, as needed  - DVT ppx w/ b/l SCDs  - Keep HOB elevated, incentive spirometer, & encourage cough / deep breathing exercises for pulm hygiene

## 2020-10-06 ENCOUNTER — TRANSCRIPTION ENCOUNTER (OUTPATIENT)
Age: 85
End: 2020-10-06

## 2020-10-06 LAB
ALBUMIN SERPL ELPH-MCNC: 3.8 G/DL — SIGNIFICANT CHANGE UP (ref 3.3–5.2)
ALP SERPL-CCNC: 119 U/L — SIGNIFICANT CHANGE UP (ref 40–120)
ALT FLD-CCNC: 7 U/L — SIGNIFICANT CHANGE UP
ANION GAP SERPL CALC-SCNC: 15 MMOL/L — SIGNIFICANT CHANGE UP (ref 5–17)
AST SERPL-CCNC: 16 U/L — SIGNIFICANT CHANGE UP
BASOPHILS # BLD AUTO: 0.06 K/UL — SIGNIFICANT CHANGE UP (ref 0–0.2)
BASOPHILS NFR BLD AUTO: 0.9 % — SIGNIFICANT CHANGE UP (ref 0–2)
BILIRUB SERPL-MCNC: 1.8 MG/DL — SIGNIFICANT CHANGE UP (ref 0.4–2)
BUN SERPL-MCNC: 18 MG/DL — SIGNIFICANT CHANGE UP (ref 8–20)
CALCIUM SERPL-MCNC: 9.7 MG/DL — SIGNIFICANT CHANGE UP (ref 8.6–10.2)
CHLORIDE SERPL-SCNC: 104 MMOL/L — SIGNIFICANT CHANGE UP (ref 98–107)
CO2 SERPL-SCNC: 23 MMOL/L — SIGNIFICANT CHANGE UP (ref 22–29)
CREAT SERPL-MCNC: 0.76 MG/DL — SIGNIFICANT CHANGE UP (ref 0.5–1.3)
EOSINOPHIL # BLD AUTO: 0.23 K/UL — SIGNIFICANT CHANGE UP (ref 0–0.5)
EOSINOPHIL NFR BLD AUTO: 3.4 % — SIGNIFICANT CHANGE UP (ref 0–6)
GLUCOSE SERPL-MCNC: 102 MG/DL — HIGH (ref 70–99)
HCT VFR BLD CALC: 37 % — SIGNIFICANT CHANGE UP (ref 34.5–45)
HGB BLD-MCNC: 12.1 G/DL — SIGNIFICANT CHANGE UP (ref 11.5–15.5)
IMM GRANULOCYTES NFR BLD AUTO: 0.3 % — SIGNIFICANT CHANGE UP (ref 0–1.5)
LYMPHOCYTES # BLD AUTO: 1.17 K/UL — SIGNIFICANT CHANGE UP (ref 1–3.3)
LYMPHOCYTES # BLD AUTO: 17.1 % — SIGNIFICANT CHANGE UP (ref 13–44)
MAGNESIUM SERPL-MCNC: 2.5 MG/DL — SIGNIFICANT CHANGE UP (ref 1.6–2.6)
MCHC RBC-ENTMCNC: 29.3 PG — SIGNIFICANT CHANGE UP (ref 27–34)
MCHC RBC-ENTMCNC: 32.7 GM/DL — SIGNIFICANT CHANGE UP (ref 32–36)
MCV RBC AUTO: 89.6 FL — SIGNIFICANT CHANGE UP (ref 80–100)
MONOCYTES # BLD AUTO: 0.93 K/UL — HIGH (ref 0–0.9)
MONOCYTES NFR BLD AUTO: 13.6 % — SIGNIFICANT CHANGE UP (ref 2–14)
NEUTROPHILS # BLD AUTO: 4.45 K/UL — SIGNIFICANT CHANGE UP (ref 1.8–7.4)
NEUTROPHILS NFR BLD AUTO: 64.7 % — SIGNIFICANT CHANGE UP (ref 43–77)
PHOSPHATE SERPL-MCNC: 3 MG/DL — SIGNIFICANT CHANGE UP (ref 2.4–4.7)
PLATELET # BLD AUTO: 148 K/UL — LOW (ref 150–400)
POTASSIUM SERPL-MCNC: 4.1 MMOL/L — SIGNIFICANT CHANGE UP (ref 3.5–5.3)
POTASSIUM SERPL-SCNC: 4.1 MMOL/L — SIGNIFICANT CHANGE UP (ref 3.5–5.3)
PROT SERPL-MCNC: 6.6 G/DL — SIGNIFICANT CHANGE UP (ref 6.6–8.7)
RBC # BLD: 4.13 M/UL — SIGNIFICANT CHANGE UP (ref 3.8–5.2)
RBC # FLD: 12.9 % — SIGNIFICANT CHANGE UP (ref 10.3–14.5)
SODIUM SERPL-SCNC: 141 MMOL/L — SIGNIFICANT CHANGE UP (ref 135–145)
WBC # BLD: 6.86 K/UL — SIGNIFICANT CHANGE UP (ref 3.8–10.5)
WBC # FLD AUTO: 6.86 K/UL — SIGNIFICANT CHANGE UP (ref 3.8–10.5)

## 2020-10-06 PROCEDURE — 99232 SBSQ HOSP IP/OBS MODERATE 35: CPT | Mod: 57

## 2020-10-06 PROCEDURE — 99223 1ST HOSP IP/OBS HIGH 75: CPT

## 2020-10-06 PROCEDURE — 93308 TTE F-UP OR LMTD: CPT | Mod: 26

## 2020-10-06 PROCEDURE — 93010 ELECTROCARDIOGRAM REPORT: CPT

## 2020-10-06 RX ORDER — POVIDONE-IODINE 5 %
1 AEROSOL (ML) TOPICAL ONCE
Refills: 0 | Status: DISCONTINUED | OUTPATIENT
Start: 2020-10-06 | End: 2020-10-07

## 2020-10-06 RX ORDER — TRANEXAMIC ACID 100 MG/ML
1000 INJECTION, SOLUTION INTRAVENOUS ONCE
Refills: 0 | Status: DISCONTINUED | OUTPATIENT
Start: 2020-10-07 | End: 2020-10-07

## 2020-10-06 RX ORDER — SODIUM CHLORIDE 9 MG/ML
1000 INJECTION, SOLUTION INTRAVENOUS
Refills: 0 | Status: DISCONTINUED | OUTPATIENT
Start: 2020-10-06 | End: 2020-10-06

## 2020-10-06 RX ORDER — CEFTRIAXONE 500 MG/1
1000 INJECTION, POWDER, FOR SOLUTION INTRAMUSCULAR; INTRAVENOUS EVERY 24 HOURS
Refills: 0 | Status: DISCONTINUED | OUTPATIENT
Start: 2020-10-06 | End: 2020-10-07

## 2020-10-06 RX ORDER — SODIUM CHLORIDE 9 MG/ML
1000 INJECTION, SOLUTION INTRAVENOUS
Refills: 0 | Status: DISCONTINUED | OUTPATIENT
Start: 2020-10-07 | End: 2020-10-07

## 2020-10-06 RX ADMIN — CARBIDOPA AND LEVODOPA 1 TABLET(S): 25; 100 TABLET ORAL at 05:52

## 2020-10-06 RX ADMIN — DONEPEZIL HYDROCHLORIDE 5 MILLIGRAM(S): 10 TABLET, FILM COATED ORAL at 21:46

## 2020-10-06 RX ADMIN — AMLODIPINE BESYLATE 5 MILLIGRAM(S): 2.5 TABLET ORAL at 05:54

## 2020-10-06 RX ADMIN — SODIUM CHLORIDE 60 MILLILITER(S): 9 INJECTION, SOLUTION INTRAVENOUS at 05:55

## 2020-10-06 RX ADMIN — HEPARIN SODIUM 5000 UNIT(S): 5000 INJECTION INTRAVENOUS; SUBCUTANEOUS at 17:23

## 2020-10-06 RX ADMIN — CHLORHEXIDINE GLUCONATE 1 APPLICATION(S): 213 SOLUTION TOPICAL at 05:54

## 2020-10-06 RX ADMIN — MEMANTINE HYDROCHLORIDE 10 MILLIGRAM(S): 10 TABLET ORAL at 17:21

## 2020-10-06 RX ADMIN — Medication 3 MILLIGRAM(S): at 21:46

## 2020-10-06 RX ADMIN — LOSARTAN POTASSIUM 50 MILLIGRAM(S): 100 TABLET, FILM COATED ORAL at 05:51

## 2020-10-06 RX ADMIN — CEFTRIAXONE 100 MILLIGRAM(S): 500 INJECTION, POWDER, FOR SOLUTION INTRAMUSCULAR; INTRAVENOUS at 17:27

## 2020-10-06 RX ADMIN — CARBIDOPA AND LEVODOPA 1 TABLET(S): 25; 100 TABLET ORAL at 21:46

## 2020-10-06 RX ADMIN — Medication 650 MILLIGRAM(S): at 07:59

## 2020-10-06 RX ADMIN — Medication 100 MILLIEQUIVALENT(S): at 11:00

## 2020-10-06 RX ADMIN — CARBIDOPA AND LEVODOPA 1 TABLET(S): 25; 100 TABLET ORAL at 14:37

## 2020-10-06 RX ADMIN — ATORVASTATIN CALCIUM 20 MILLIGRAM(S): 80 TABLET, FILM COATED ORAL at 21:46

## 2020-10-06 RX ADMIN — Medication 650 MILLIGRAM(S): at 05:54

## 2020-10-06 RX ADMIN — DONEPEZIL HYDROCHLORIDE 5 MILLIGRAM(S): 10 TABLET, FILM COATED ORAL at 13:38

## 2020-10-06 RX ADMIN — Medication 2000 UNIT(S): at 21:45

## 2020-10-06 RX ADMIN — MEMANTINE HYDROCHLORIDE 10 MILLIGRAM(S): 10 TABLET ORAL at 05:51

## 2020-10-06 RX ADMIN — SERTRALINE 100 MILLIGRAM(S): 25 TABLET, FILM COATED ORAL at 13:39

## 2020-10-06 RX ADMIN — HEPARIN SODIUM 5000 UNIT(S): 5000 INJECTION INTRAVENOUS; SUBCUTANEOUS at 05:54

## 2020-10-06 NOTE — PROGRESS NOTE ADULT - ASSESSMENT
86 y/o female w/ PMH dementia, parkinsons dz, severe aortic stenosis, now s/p fall sustaining R femoral neck fracture. s/p balloon valvuloplasty for aortic stenosis  clinically stable  OR tomorrow with orthopedics  continue pain control

## 2020-10-06 NOTE — PROGRESS NOTE ADULT - SUBJECTIVE AND OBJECTIVE BOX
INTERVAL HPI/OVERNIGHT EVENTS:  Patient was seen and examined at bedside this AM.  No acute events overnight. POD1 balloon valvuloplasty, observed in MICU, uncomplicated post procedural course, will be downgraded to surgical floors today, optimized for fixation of R femoral neck fracture with orthopedic surgery          MEDICATIONS  (STANDING):  amLODIPine   Tablet 5 milliGRAM(s) Oral daily  atorvastatin 20 milliGRAM(s) Oral at bedtime  carbidopa/levodopa  25/100 1 Tablet(s) Oral three times a day  cefTRIAXone   IVPB 1000 milliGRAM(s) IV Intermittent every 24 hours  chlorhexidine 2% Cloths 1 Application(s) Topical <User Schedule>  cholecalciferol 2000 Unit(s) Oral every 48 hours  donepezil 5 milliGRAM(s) Oral daily  donepezil 5 milliGRAM(s) Oral at bedtime  heparin   Injectable 5000 Unit(s) SubCutaneous every 12 hours  losartan 50 milliGRAM(s) Oral daily  melatonin 3 milliGRAM(s) Oral at bedtime  memantine 10 milliGRAM(s) Oral two times a day  sertraline 100 milliGRAM(s) Oral daily    MEDICATIONS  (PRN):  acetaminophen   Tablet .. 650 milliGRAM(s) Oral every 6 hours PRN Moderate Pain (4 - 6)      Vital Signs Last 24 Hrs  T(C): 36.7 (06 Oct 2020 11:16), Max: 37.3 (05 Oct 2020 17:55)  T(F): 98 (06 Oct 2020 11:16), Max: 99.1 (05 Oct 2020 17:55)  HR: 81 (06 Oct 2020 13:00) (64 - 83)  BP: 146/65 (06 Oct 2020 13:00) (99/56 - 180/70)  BP(mean): 94 (06 Oct 2020 13:00) (71 - 107)  RR: 21 (06 Oct 2020 13:00) (15 - 57)  SpO2: 97% (06 Oct 2020 13:00) (92% - 99%)    Physical Exam:  Constitutional: patient appears comfortable lying in bed, in no acute distress, calm & cooperative w/ exam  HEENT: EOMI / PERRL b/l, no active drainage or redness  Neck: No JVD, ROM without pain  Respiratory: respirations are unlabored, no accessory muscle use, no conversational dyspnea  Cardiovascular: regular rate & rhythm  Gastrointestinal: abdomen soft, non-tender, non-distended, no rebound tenderness / guarding  Neurological: pleasantly demented, oriented to self only  Musculoskeletal: RLE is non-tender, compartments soft, able to move toes without difficulty, RLE is warm w/ brisk cap refill, 2+ DP pulse        I&O's Detail    05 Oct 2020 07:01  -  06 Oct 2020 07:00  --------------------------------------------------------  IN:    Lactated Ringers: 360 mL    Oral Fluid: 120 mL  Total IN: 480 mL    OUT:    Voided (mL): 315 mL  Total OUT: 315 mL    Total NET: 165 mL      06 Oct 2020 07:01  -  06 Oct 2020 15:57  --------------------------------------------------------  IN:    Oral Fluid: 840 mL  Total IN: 840 mL    OUT:    Incontinent per Collection Bag (mL): 500 mL  Total OUT: 500 mL    Total NET: 340 mL          LABS:                        12.1   6.86  )-----------( 148      ( 06 Oct 2020 05:50 )             37.0     10-06    141  |  104  |  18.0  ----------------------------<  102<H>  4.1   |  23.0  |  0.76    Ca    9.7      06 Oct 2020 05:50  Phos  3.0     10-06  Mg     2.5     10-06    TPro  6.6  /  Alb  3.8  /  TBili  1.8  /  DBili  x   /  AST  16  /  ALT  7   /  AlkPhos  119  10-06          RADIOLOGY & ADDITIONAL STUDIES:

## 2020-10-06 NOTE — PROGRESS NOTE ADULT - SUBJECTIVE AND OBJECTIVE BOX
CC: right hip fracture    HPI:  A 86 yo F with h/o Alzheimer's who lives alone w nursing aid fell at home. She normally mobilizes with walker and requires assistance for daily living. She was c/o pain to daughter and unable to bare weight. Pt was admitted to trauma service for right hip fracture. Cardiology evaluated pt and for Severe AS. Pt is s/p aorta valvuloplasty. Post procedure pt had wide complex  tachycardia and upgraded to MICU. Potassium and magnesium replenishing to keep K>4 and Mag>2. Pt stable and now downgraded to medical service.     Pt denies any sob, no chest pain, no cough or pain.       PAST MEDICAL & SURGICAL HISTORY:  Falls frequently  Parkinsonism, unspecified Parkinsonism type  Depression, unspecified depression type  Alzheimer&#x27;s dementia without behavioral disturbance, unspecified timing of dementia onset  Cholesterolosis      Social History:  Tabacco - Denies  ETOH - Denies  Illicit drug abuse - denies    FAMILY HISTORY:  Pt cant recall her family medical hx    Allergies  No Known Allergies      REVIEW OF SYSTEMS:  All other ROS are negative except noted in HPI    MEDICATIONS  (STANDING):  amLODIPine   Tablet 5 milliGRAM(s) Oral daily  atorvastatin 20 milliGRAM(s) Oral at bedtime  carbidopa/levodopa  25/100 1 Tablet(s) Oral three times a day  cefTRIAXone   IVPB 1000 milliGRAM(s) IV Intermittent every 24 hours  chlorhexidine 2% Cloths 1 Application(s) Topical <User Schedule>  cholecalciferol 2000 Unit(s) Oral every 48 hours  donepezil 5 milliGRAM(s) Oral daily  donepezil 5 milliGRAM(s) Oral at bedtime  heparin   Injectable 5000 Unit(s) SubCutaneous every 12 hours  losartan 50 milliGRAM(s) Oral daily  melatonin 3 milliGRAM(s) Oral at bedtime  memantine 10 milliGRAM(s) Oral two times a day  potassium chloride  10 mEq/100 mL IVPB 10 milliEquivalent(s) IV Intermittent every 1 hour  sertraline 100 milliGRAM(s) Oral daily    MEDICATIONS  (PRN):  acetaminophen   Tablet .. 650 milliGRAM(s) Oral every 6 hours PRN Moderate Pain (4 - 6)      Vital Signs Last 24 Hrs  T(C): 36.6 (06 Oct 2020 07:18), Max: 37.3 (05 Oct 2020 17:55)  T(F): 97.8 (06 Oct 2020 07:18), Max: 99.1 (05 Oct 2020 17:55)  HR: 67 (06 Oct 2020 07:00) (64 - 80)  BP: 129/93 (06 Oct 2020 07:00) (115/56 - 180/70)  BP(mean): 106 (06 Oct 2020 07:00) (71 - 107)  RR: 18 (06 Oct 2020 07:00) (15 - 57)  SpO2: 96% (06 Oct 2020 07:00) (92% - 99%)    PHYSICAL EXAM:  GENERAL: NAD, well-groomed, well-developed  HEAD:  Atraumatic, Normocephalic  EYES: EOMI, PERRLA, conjunctiva and sclera clear  NERVOUS SYSTEM:  Motor Strength 5/5 B/L upper and lower extremities; sensory intact  CHEST/LUNG: CTA  b/l,  no rales, rhonchi, wheezing  HEART: Regular rate and rhythm; No murmurs  ABDOMEN: Soft, Nontender, Nondistended; Bowel sounds present  EXTREMITIES:  2+ Peripheral Pulses, No clubbing, cyanosis, or edema ,       LABS:                        12.1   6.86  )-----------( 148      ( 06 Oct 2020 05:50 )             37.0     10-06    141  |  104  |  18.0  ----------------------------<  102<H>  4.1   |  23.0  |  0.76    Ca    9.7      06 Oct 2020 05:50  Phos  3.0     10-06  Mg     2.5     10-06    TPro  6.6  /  Alb  3.8  /  TBili  1.8  /  DBili  x   /  AST  16  /  ALT  7   /  AlkPhos  119  10-06      Urinalysis Basic - ( 04 Oct 2020 10:13 )    Color: Yellow / Appearance: Slightly Turbid / S.010 / pH: x  Gluc: x / Ketone: Trace  / Bili: Negative / Urobili: 4 mg/dL   Blood: x / Protein: 30 mg/dL / Nitrite: Negative   Leuk Esterase: Moderate / RBC: 3-5 /HPF / WBC >50   Sq Epi: x / Non Sq Epi: Occasional / Bacteria: Many

## 2020-10-06 NOTE — PROGRESS NOTE ADULT - ASSESSMENT
A 86 yo F with h/o Alzheimer's who lives alone w/ 24/7 nursing aid fell at home. She normally mobilizes with walker and requires assistance for daily living. She was c/o pain to daughter and unable to bare weight. Pt was admitted to trauma service for right hip fracture. Cardiology evaluated pt and for Severe AS. Pt is s/p aorta valvuloplasty. Post procedure pt had wide complex  tachycardia and upgraded to MICU. Potassium and magnesium replenishing to keep K>4 and Mag>2. Pt stable and now downgraded to medical service.      1) Right hip fracture  - for OR via Ortho pending    2) Severe Aortic Stenosis s/p aortic balloon valvuloplasty  - cardio following    3) Wide complex tachycardia   - resolved  - keep K>4 & Mag >2  - on tele    4) UTI  - on Rocephin  - Urine cx pending results    5) HTN  - on Norvasc and Losartan  - monitor BP    6) HLD  - on statin    7) Parkinson Disease  - on sinemet    8) Depression   - on zoloft    9) AZ Dementia  - on Aricept and Namenda A 84 yo F with h/o Alzheimer's who lives alone w/ 24/7 nursing aid fell at home. She normally mobilizes with walker and requires assistance for daily living. She was c/o pain to daughter and unable to bare weight. Pt was admitted to trauma service for right hip fracture. Cardiology evaluated pt and for Severe AS. Pt is s/p aorta valvuloplasty. Post procedure pt had wide complex  tachycardia and upgraded to MICU. Potassium and magnesium replenishing to keep K>4 and Mag>2. Pt stable and now downgraded to medical service.      1) Right hip fracture  - for OR via Ortho pending    2) Severe Aortic Stenosis s/p aortic balloon valvuloplasty  - cardio following    3) Wide complex tachycardia   - resolved  - keep K>4 & Mag >2  - on tele    4) UTI  - on Rocephin  - Urine cx pending results    5) HTN  - on Norvasc and Losartan  - monitor BP    6) HLD  - on statin    7) Parkinson Disease  - on sinemet    8) Depression   - on zoloft    9) AZ Dementia  - on Aricept and Namenda      10) Prophylactic Measure  - DVT ppx: Heparin SQ

## 2020-10-06 NOTE — PROGRESS NOTE ADULT - SUBJECTIVE AND OBJECTIVE BOX
Ortho Preop Note    GENO VERA    [id number]    Patient is a 85y old  Female who presents with a chief complaint of blunt trauma, fall from standing, r femoral neck fx (06 Oct 2020 08:50)      Diagnosis: right femoral neck fracture    Procedure: pending hemiarthroplasty    Surgeon: Dr. Mojica                          12.1   6.86  )-----------( 148      ( 06 Oct 2020 05:50 )             37.0     10-    141  |  104  |  18.0  ----------------------------<  102<H>  4.1   |  23.0  |  0.76    Ca    9.7      06 Oct 2020 05:50  Phos  3.0     10-06  Mg     2.5     10-06    TPro  6.6  /  Alb  3.8  /  TBili  1.8  /  DBili  x   /  AST  16  /  ALT  7   /  AlkPhos  119  10      Urinalysis Basic - ( 04 Oct 2020 10:13 )    Color: Yellow / Appearance: Slightly Turbid / S.010 / pH: x  Gluc: x / Ketone: Trace  / Bili: Negative / Urobili: 4 mg/dL   Blood: x / Protein: 30 mg/dL / Nitrite: Negative   Leuk Esterase: Moderate / RBC: 3-5 /HPF / WBC >50   Sq Epi: x / Non Sq Epi: Occasional / Bacteria: Many        T(C): 36.6 (10-06-20 @ 07:18), Max: 37.3 (10-05-20 @ 17:55)  HR: 67 (10-06-20 @ 07:00) (64 - 80)  BP: 129/93 (10-06-20 @ 07:00) (115/56 - 180/70)  RR: 18 (10-06-20 @ 07:00) (15 - 57)  SpO2: 96% (10-06-20 @ 07:00) (92% - 99%)    PE:    Gen: Alert to name only, responsive, in discomfort with movement    Skin: Warm, dry and intact. No erythema or ecchymosis or bleeding noted of the injured site.    PV: 2+ DP. No cyanosis. No calf tenderness.    Sensation: Grossly intact to light touch of the affected extremity.    Motor: + 5/5 Motor function of the right ankle and knee of the affected extremity. + right hip tenderness. No calf tenderess.      Assessment & Plan:    85y  Female with a right femoral neck fracture     - For OR once cleared by medicine / PLANNED OR for Wednesday  - Bed rest - NWB of the RLE  - DVTP - as prescribed

## 2020-10-06 NOTE — PROGRESS NOTE ADULT - SUBJECTIVE AND OBJECTIVE BOX
85y Female for rt hip hemiaethroplast        PAST MEDICAL & SURGICAL HISTORY:  Dementia  Aortic stenosis s/p aortic ballon angioplast ,     Parkinsonism, unspecified Parkinsonism type    Depression, unspecified depression type        Cholesterolosis    No significant past surgical history        MEDICATIONS  (STANDING):  amLODIPine   Tablet 5 milliGRAM(s) Oral daily  atorvastatin 20 milliGRAM(s) Oral at bedtime  carbidopa/levodopa  25/100 1 Tablet(s) Oral three times a day  cefTRIAXone   IVPB 1000 milliGRAM(s) IV Intermittent every 24 hours  chlorhexidine 2% Cloths 1 Application(s) Topical <User Schedule>  cholecalciferol 2000 Unit(s) Oral every 48 hours  donepezil 5 milliGRAM(s) Oral daily  donepezil 5 milliGRAM(s) Oral at bedtime  heparin   Injectable 5000 Unit(s) SubCutaneous every 12 hours  losartan 50 milliGRAM(s) Oral daily  melatonin 3 milliGRAM(s) Oral at bedtime  memantine 10 milliGRAM(s) Oral two times a day  sertraline 100 milliGRAM(s) Oral daily        Allergies    No Known Allergies      Vital Signs Last 24 Hrs  T(C): 36.7 (06 Oct 2020 11:16), Max: 37.3 (05 Oct 2020 17:55)  T(F): 98 (06 Oct 2020 11:16), Max: 99.1 (05 Oct 2020 17:55)  HR: 81 (06 Oct 2020 13:00) (64 - 83)  BP: 146/65 (06 Oct 2020 13:00) (99/56 - 180/70)  BP(mean): 94 (06 Oct 2020 13:00) (71 - 107)  RR: 21 (06 Oct 2020 13:00) (15 - 57)  SpO2: 97% (06 Oct 2020 13:00) (92% - 99%)            LABS:                        12.1   6.86  )-----------( 148      ( 06 Oct 2020 05:50 )             37.0     10-06    141  |  104  |  18.0  ----------------------------<  102<H>  4.1   |  23.0  |  0.76    Ca    9.7      06 Oct 2020 05:50  Phos  3.0     10-06  Mg     2.5     10-06    TPro  6.6  /  Alb  3.8  /  TBili  1.8  /  DBili  x   /  AST  16  /  ALT  7   /  AlkPhos  119  10-06    patient got cardiac cath and TTE , Pending cardiac clearance   Patient is an ASA class   III

## 2020-10-06 NOTE — PROGRESS NOTE ADULT - ASSESSMENT
84 yo f pmhx HTN, HLD, Alzheimers dementia, severe AS, nonobstructive CAD, PVD admitted s/p mechanical fall at home and sustained right femoral neck fracture s/p aortic balloon valvuloplasty.      NEURO: Alzheimers on namenda, zoloft, aricept and sinemet.   CV: HTN on norvasc and losartan.  HLD on statin therapy   RESP: No active issues.   RENAL: Monitor lytes, replace as needed.  potassium repleted this am.   GI: Dash/TLC diet, NPO after midnight.   ENDO: No active issues  ID: urine cx sent this am, started on rocephin.   HEME: Heparin for vte ppx  DISPO: Full code.      Patient seen and examined, vitals/chart/medications reviewed, case discussed with MICU attending Dr. Vaughn.  At this time patient stable for transfer to [facility], endorsed to Dr. De Leon. 84 yo f pmhx HTN, HLD, Alzheimers dementia, severe AS, nonobstructive CAD, PVD admitted s/p mechanical fall at home and sustained right femoral neck fracture s/p aortic balloon valvuloplasty.      NEURO: Alzheimers on namenda, zoloft, aricept and sinemet.   CV: HTN on norvasc and losartan.  HLD on statin therapy.  Severe AS s/p BAV, now with worsening AI.  TTE pending for today. Cards following.  RESP: No active issues.   RENAL: Monitor lytes, replace as needed.  potassium repleted this am.   GI: Dash/TLC diet, NPO after midnight.   ENDO: No active issues  ID: urine cx sent this am, started on rocephin.   HEME: Heparin for vte ppx  DISPO: Full code.      Patient seen and examined, vitals/chart/medications reviewed, case discussed with MICU attending Dr. Vaughn.  At this time patient stable for transfer to [facility], endorsed to Dr. De Leon.

## 2020-10-06 NOTE — CHART NOTE - NSCHARTNOTEFT_GEN_A_CORE
Cardiology NP f/u note: site check SP BAV 10/5/20 with Dr. Bhatt via Left groin  Pt denies complaints of chest pain/sob/dizziness/palps  is confused and disoriented secondary to dementia   [facility] cardiology managing care and following  Left groin site soft no hematoma, dressing removed   distal pulses 2+

## 2020-10-06 NOTE — PROGRESS NOTE ADULT - SUBJECTIVE AND OBJECTIVE BOX
[facility] - [facility]                                   [address] [place] [address]                                                      PHONE: [phone]                                                         FAX: [phone]  [email or web address]  ---------------------------------------------------------------------------------------------------------------------------------    Overnight events/patient complaints:  Patient tolerated BAV well.  She is in the ICU and hemodynamically stable.  She is unable to give hx but states that she is feeling well.     PAST MEDICAL & SURGICAL HISTORY:  Falls frequently    Parkinsonism, unspecified Parkinsonism type    Depression, unspecified depression type    Alzheimer&#x27;s dementia without behavioral disturbance, unspecified timing of dementia onset    Cholesterolosis    No significant past surgical history        No Known Allergies    MEDICATIONS  (STANDING):  amLODIPine   Tablet 5 milliGRAM(s) Oral daily  atorvastatin 20 milliGRAM(s) Oral at bedtime  carbidopa/levodopa  25/100 1 Tablet(s) Oral three times a day  cefTRIAXone   IVPB 1000 milliGRAM(s) IV Intermittent every 24 hours  chlorhexidine 2% Cloths 1 Application(s) Topical <User Schedule>  cholecalciferol 2000 Unit(s) Oral every 48 hours  donepezil 5 milliGRAM(s) Oral daily  donepezil 5 milliGRAM(s) Oral at bedtime  heparin   Injectable 5000 Unit(s) SubCutaneous every 12 hours  losartan 50 milliGRAM(s) Oral daily  melatonin 3 milliGRAM(s) Oral at bedtime  memantine 10 milliGRAM(s) Oral two times a day  potassium chloride  10 mEq/100 mL IVPB 10 milliEquivalent(s) IV Intermittent every 1 hour  sertraline 100 milliGRAM(s) Oral daily    MEDICATIONS  (PRN):  acetaminophen   Tablet .. 650 milliGRAM(s) Oral every 6 hours PRN Moderate Pain (4 - 6)      Vital Signs Last 24 Hrs  T(C): 36.6 (06 Oct 2020 07:18), Max: 37.3 (05 Oct 2020 17:55)  T(F): 97.8 (06 Oct 2020 07:18), Max: 99.1 (05 Oct 2020 17:55)  HR: 67 (06 Oct 2020 07:00) (64 - 80)  BP: 129/93 (06 Oct 2020 07:00) (115/56 - 180/70)  BP(mean): 106 (06 Oct 2020 07:00) (71 - 107)  RR: 18 (06 Oct 2020 07:00) (15 - 57)  SpO2: 96% (06 Oct 2020 07:00) (92% - 99%)  ICU Vital Signs Last 24 Hrs  GENO VERA  I&O's Detail    05 Oct 2020 07:01  -  06 Oct 2020 07:00  --------------------------------------------------------  IN:    Lactated Ringers: 360 mL    Oral Fluid: 120 mL  Total IN: 480 mL    OUT:    Voided (mL): 315 mL  Total OUT: 315 mL    Total NET: 165 mL        I&O's Summary    05 Oct 2020 07:01  -  06 Oct 2020 07:00  --------------------------------------------------------  IN: 480 mL / OUT: 315 mL / NET: 165 mL      Drug Dosing Weight  GENOJOSE KENNEDYJONATHON      PHYSICAL EXAM:  PHYSICAL EXAM:  General: Appears well developed, well nourished alert and cooperative.  HEENT: Head; normocephalic, atraumatic.  Eyes: Pupils reactive, cornea wnl.  Neck: Supple, no nodes adenopathy, no NVD or carotid bruit or thyromegaly.  CARDIOVASCULAR: 3/6 NIR at RUSB, no rub, gallop or lift. Normal S1 and S2.  LUNGS: No rales, rhonchi or wheeze. Normal breath sounds bilaterally.  ABDOMEN: Soft, nontender without mass or organomegaly. bowel sounds normoactive.  EXTREMITIES: Cath site with minimal ecchymosis; nontender  SKIN: warm and dry with normal turgor.  NEURO: Alert/oriented x 1    PSYCH: confused        LABS:                        12.1   6.86  )-----------( 148      ( 06 Oct 2020 05:50 )             37.0     10-    141  |  104  |  18.0  ----------------------------<  102<H>  4.1   |  23.0  |  0.76    Ca    9.7      06 Oct 2020 05:50  Phos  3.0     10-  Mg     2.5     10    TPro  6.6  /  Alb  3.8  /  TBili  1.8  /  DBili  x   /  AST  16  /  ALT  7   /  AlkPhos  119  10-    GENO VERA        Urinalysis Basic - ( 04 Oct 2020 10:13 )    Color: Yellow / Appearance: Slightly Turbid / S.010 / pH: x  Gluc: x / Ketone: Trace  / Bili: Negative / Urobili: 4 mg/dL   Blood: x / Protein: 30 mg/dL / Nitrite: Negative   Leuk Esterase: Moderate / RBC: 3-5 /HPF / WBC >50   Sq Epi: x / Non Sq Epi: Occasional / Bacteria: Many        RADIOLOGY & ADDITIONAL STUDIES:    INTERPRETATION OF TELEMETRY (personally reviewed):    ECG:    ECHO:    STRESS TEST:    CARDIAC CATHETERIZATION:    ASSESSMENT AND PLAN:  In summary, GENO VERA is an 85y Female with past medical history significant for dementia, severe AS, nonobstructive CAD, PVD presents with fall found to have hip fx.    Preoperative risk assessment  Patient had BAV yesterday which was successful.  Gradient down from 18 to 11.  Echo pending today.  Will review echocardiogram but it is likely that she is currently optimized for surgery unless there is severe aortic valvular stenosis or insufficiency.  Patient can proceed to surgery barring the results of the echo.       HLD  Continue atorvastatin 20mg qhs    HTN  Continue losartan and amlodipine    Thank you for allowing [facility] to participate in the care of this patient.  Please feel free to call with any questions or concerns.

## 2020-10-07 ENCOUNTER — TRANSCRIPTION ENCOUNTER (OUTPATIENT)
Age: 85
End: 2020-10-07

## 2020-10-07 LAB
ANION GAP SERPL CALC-SCNC: 13 MMOL/L — SIGNIFICANT CHANGE UP (ref 5–17)
APTT BLD: 29.8 SEC — SIGNIFICANT CHANGE UP (ref 27.5–35.5)
BUN SERPL-MCNC: 16 MG/DL — SIGNIFICANT CHANGE UP (ref 8–20)
CALCIUM SERPL-MCNC: 9.2 MG/DL — SIGNIFICANT CHANGE UP (ref 8.6–10.2)
CHLORIDE SERPL-SCNC: 105 MMOL/L — SIGNIFICANT CHANGE UP (ref 98–107)
CO2 SERPL-SCNC: 22 MMOL/L — SIGNIFICANT CHANGE UP (ref 22–29)
CREAT SERPL-MCNC: 0.72 MG/DL — SIGNIFICANT CHANGE UP (ref 0.5–1.3)
GLUCOSE BLDC GLUCOMTR-MCNC: 133 MG/DL — HIGH (ref 70–99)
GLUCOSE SERPL-MCNC: 105 MG/DL — HIGH (ref 70–99)
HCT VFR BLD CALC: 33.2 % — LOW (ref 34.5–45)
HGB BLD-MCNC: 11.3 G/DL — LOW (ref 11.5–15.5)
INR BLD: 1.11 RATIO — SIGNIFICANT CHANGE UP (ref 0.88–1.16)
MAGNESIUM SERPL-MCNC: 2.2 MG/DL — SIGNIFICANT CHANGE UP (ref 1.6–2.6)
MCHC RBC-ENTMCNC: 29.7 PG — SIGNIFICANT CHANGE UP (ref 27–34)
MCHC RBC-ENTMCNC: 34 GM/DL — SIGNIFICANT CHANGE UP (ref 32–36)
MCV RBC AUTO: 87.1 FL — SIGNIFICANT CHANGE UP (ref 80–100)
PHOSPHATE SERPL-MCNC: 2.6 MG/DL — SIGNIFICANT CHANGE UP (ref 2.4–4.7)
PLATELET # BLD AUTO: 138 K/UL — LOW (ref 150–400)
POTASSIUM SERPL-MCNC: 3.8 MMOL/L — SIGNIFICANT CHANGE UP (ref 3.5–5.3)
POTASSIUM SERPL-SCNC: 3.8 MMOL/L — SIGNIFICANT CHANGE UP (ref 3.5–5.3)
PROTHROM AB SERPL-ACNC: 12.8 SEC — SIGNIFICANT CHANGE UP (ref 10.6–13.6)
RBC # BLD: 3.81 M/UL — SIGNIFICANT CHANGE UP (ref 3.8–5.2)
RBC # FLD: 12.8 % — SIGNIFICANT CHANGE UP (ref 10.3–14.5)
SARS-COV-2 RNA SPEC QL NAA+PROBE: SIGNIFICANT CHANGE UP
SODIUM SERPL-SCNC: 140 MMOL/L — SIGNIFICANT CHANGE UP (ref 135–145)
WBC # BLD: 5.89 K/UL — SIGNIFICANT CHANGE UP (ref 3.8–10.5)
WBC # FLD AUTO: 5.89 K/UL — SIGNIFICANT CHANGE UP (ref 3.8–10.5)

## 2020-10-07 PROCEDURE — 27236 TREAT THIGH FRACTURE: CPT | Mod: LT

## 2020-10-07 PROCEDURE — 27236 TREAT THIGH FRACTURE: CPT | Mod: AS,LT

## 2020-10-07 PROCEDURE — 99233 SBSQ HOSP IP/OBS HIGH 50: CPT

## 2020-10-07 PROCEDURE — 73501 X-RAY EXAM HIP UNI 1 VIEW: CPT | Mod: 26,RT

## 2020-10-07 PROCEDURE — 99231 SBSQ HOSP IP/OBS SF/LOW 25: CPT

## 2020-10-07 PROCEDURE — 93010 ELECTROCARDIOGRAM REPORT: CPT

## 2020-10-07 RX ORDER — DONEPEZIL HYDROCHLORIDE 10 MG/1
5 TABLET, FILM COATED ORAL AT BEDTIME
Refills: 0 | Status: DISCONTINUED | OUTPATIENT
Start: 2020-10-07 | End: 2020-10-09

## 2020-10-07 RX ORDER — LANOLIN ALCOHOL/MO/W.PET/CERES
3 CREAM (GRAM) TOPICAL AT BEDTIME
Refills: 0 | Status: DISCONTINUED | OUTPATIENT
Start: 2020-10-07 | End: 2020-10-09

## 2020-10-07 RX ORDER — SERTRALINE 25 MG/1
100 TABLET, FILM COATED ORAL DAILY
Refills: 0 | Status: DISCONTINUED | OUTPATIENT
Start: 2020-10-07 | End: 2020-10-09

## 2020-10-07 RX ORDER — DONEPEZIL HYDROCHLORIDE 10 MG/1
5 TABLET, FILM COATED ORAL DAILY
Refills: 0 | Status: DISCONTINUED | OUTPATIENT
Start: 2020-10-07 | End: 2020-10-09

## 2020-10-07 RX ORDER — AMLODIPINE BESYLATE 2.5 MG/1
5 TABLET ORAL DAILY
Refills: 0 | Status: DISCONTINUED | OUTPATIENT
Start: 2020-10-07 | End: 2020-10-09

## 2020-10-07 RX ORDER — LOSARTAN POTASSIUM 100 MG/1
50 TABLET, FILM COATED ORAL DAILY
Refills: 0 | Status: DISCONTINUED | OUTPATIENT
Start: 2020-10-07 | End: 2020-10-08

## 2020-10-07 RX ORDER — FENTANYL CITRATE 50 UG/ML
25 INJECTION INTRAVENOUS ONCE
Refills: 0 | Status: DISCONTINUED | OUTPATIENT
Start: 2020-10-07 | End: 2020-10-07

## 2020-10-07 RX ORDER — FENTANYL CITRATE 50 UG/ML
25 INJECTION INTRAVENOUS
Refills: 0 | Status: DISCONTINUED | OUTPATIENT
Start: 2020-10-07 | End: 2020-10-07

## 2020-10-07 RX ORDER — SODIUM CHLORIDE 9 MG/ML
1000 INJECTION, SOLUTION INTRAVENOUS
Refills: 0 | Status: DISCONTINUED | OUTPATIENT
Start: 2020-10-07 | End: 2020-10-07

## 2020-10-07 RX ORDER — MEMANTINE HYDROCHLORIDE 10 MG/1
10 TABLET ORAL
Refills: 0 | Status: DISCONTINUED | OUTPATIENT
Start: 2020-10-07 | End: 2020-10-09

## 2020-10-07 RX ORDER — CHOLECALCIFEROL (VITAMIN D3) 125 MCG
2000 CAPSULE ORAL
Refills: 0 | Status: DISCONTINUED | OUTPATIENT
Start: 2020-10-07 | End: 2020-10-09

## 2020-10-07 RX ORDER — ONDANSETRON 8 MG/1
4 TABLET, FILM COATED ORAL ONCE
Refills: 0 | Status: DISCONTINUED | OUTPATIENT
Start: 2020-10-07 | End: 2020-10-07

## 2020-10-07 RX ORDER — CEFAZOLIN SODIUM 1 G
2000 VIAL (EA) INJECTION
Refills: 0 | Status: COMPLETED | OUTPATIENT
Start: 2020-10-07 | End: 2020-10-08

## 2020-10-07 RX ORDER — CARBIDOPA AND LEVODOPA 25; 100 MG/1; MG/1
1 TABLET ORAL THREE TIMES A DAY
Refills: 0 | Status: DISCONTINUED | OUTPATIENT
Start: 2020-10-07 | End: 2020-10-09

## 2020-10-07 RX ORDER — ATORVASTATIN CALCIUM 80 MG/1
20 TABLET, FILM COATED ORAL AT BEDTIME
Refills: 0 | Status: DISCONTINUED | OUTPATIENT
Start: 2020-10-07 | End: 2020-10-09

## 2020-10-07 RX ORDER — CHLORHEXIDINE GLUCONATE 213 G/1000ML
1 SOLUTION TOPICAL DAILY
Refills: 0 | Status: DISCONTINUED | OUTPATIENT
Start: 2020-10-07 | End: 2020-10-09

## 2020-10-07 RX ORDER — ACETAMINOPHEN 500 MG
650 TABLET ORAL EVERY 6 HOURS
Refills: 0 | Status: DISCONTINUED | OUTPATIENT
Start: 2020-10-07 | End: 2020-10-08

## 2020-10-07 RX ORDER — LIDOCAINE 4 G/100G
1 CREAM TOPICAL DAILY
Refills: 0 | Status: DISCONTINUED | OUTPATIENT
Start: 2020-10-07 | End: 2020-10-09

## 2020-10-07 RX ORDER — ENOXAPARIN SODIUM 100 MG/ML
40 INJECTION SUBCUTANEOUS DAILY
Refills: 0 | Status: DISCONTINUED | OUTPATIENT
Start: 2020-10-07 | End: 2020-10-09

## 2020-10-07 RX ORDER — FENTANYL CITRATE 50 UG/ML
50 INJECTION INTRAVENOUS
Refills: 0 | Status: DISCONTINUED | OUTPATIENT
Start: 2020-10-07 | End: 2020-10-07

## 2020-10-07 RX ADMIN — FENTANYL CITRATE 25 MICROGRAM(S): 50 INJECTION INTRAVENOUS at 16:13

## 2020-10-07 RX ADMIN — ENOXAPARIN SODIUM 40 MILLIGRAM(S): 100 INJECTION SUBCUTANEOUS at 17:22

## 2020-10-07 RX ADMIN — DONEPEZIL HYDROCHLORIDE 5 MILLIGRAM(S): 10 TABLET, FILM COATED ORAL at 21:09

## 2020-10-07 RX ADMIN — FENTANYL CITRATE 25 MICROGRAM(S): 50 INJECTION INTRAVENOUS at 13:49

## 2020-10-07 RX ADMIN — CARBIDOPA AND LEVODOPA 1 TABLET(S): 25; 100 TABLET ORAL at 21:10

## 2020-10-07 RX ADMIN — FENTANYL CITRATE 25 MICROGRAM(S): 50 INJECTION INTRAVENOUS at 17:15

## 2020-10-07 RX ADMIN — MEMANTINE HYDROCHLORIDE 10 MILLIGRAM(S): 10 TABLET ORAL at 05:19

## 2020-10-07 RX ADMIN — LIDOCAINE 1 PATCH: 4 CREAM TOPICAL at 20:46

## 2020-10-07 RX ADMIN — FENTANYL CITRATE 25 MICROGRAM(S): 50 INJECTION INTRAVENOUS at 14:19

## 2020-10-07 RX ADMIN — AMLODIPINE BESYLATE 5 MILLIGRAM(S): 2.5 TABLET ORAL at 05:19

## 2020-10-07 RX ADMIN — MEMANTINE HYDROCHLORIDE 10 MILLIGRAM(S): 10 TABLET ORAL at 21:10

## 2020-10-07 RX ADMIN — Medication 100 MILLIGRAM(S): at 18:44

## 2020-10-07 RX ADMIN — LOSARTAN POTASSIUM 50 MILLIGRAM(S): 100 TABLET, FILM COATED ORAL at 05:19

## 2020-10-07 RX ADMIN — SODIUM CHLORIDE 75 MILLILITER(S): 9 INJECTION, SOLUTION INTRAVENOUS at 13:49

## 2020-10-07 RX ADMIN — FENTANYL CITRATE 50 MICROGRAM(S): 50 INJECTION INTRAVENOUS at 13:58

## 2020-10-07 RX ADMIN — CHLORHEXIDINE GLUCONATE 1 APPLICATION(S): 213 SOLUTION TOPICAL at 17:22

## 2020-10-07 RX ADMIN — Medication 2000 UNIT(S): at 21:08

## 2020-10-07 RX ADMIN — FENTANYL CITRATE 25 MICROGRAM(S): 50 INJECTION INTRAVENOUS at 15:43

## 2020-10-07 RX ADMIN — FENTANYL CITRATE 25 MICROGRAM(S): 50 INJECTION INTRAVENOUS at 17:00

## 2020-10-07 RX ADMIN — SODIUM CHLORIDE 60 MILLILITER(S): 9 INJECTION, SOLUTION INTRAVENOUS at 00:01

## 2020-10-07 RX ADMIN — Medication 3 MILLIGRAM(S): at 21:09

## 2020-10-07 RX ADMIN — CARBIDOPA AND LEVODOPA 1 TABLET(S): 25; 100 TABLET ORAL at 05:19

## 2020-10-07 RX ADMIN — LIDOCAINE 1 PATCH: 4 CREAM TOPICAL at 17:21

## 2020-10-07 RX ADMIN — ATORVASTATIN CALCIUM 20 MILLIGRAM(S): 80 TABLET, FILM COATED ORAL at 21:10

## 2020-10-07 RX ADMIN — Medication 100 MILLIGRAM(S): at 11:20

## 2020-10-07 RX ADMIN — FENTANYL CITRATE 50 MICROGRAM(S): 50 INJECTION INTRAVENOUS at 14:28

## 2020-10-07 NOTE — PROGRESS NOTE ADULT - ASSESSMENT
A/P: 84 y/o F w/PMH of Alzheimer's, Parkinsons, and severe AS, now s/p balloon valvuloplasty. Stable on floor; cleared by Cardiology for surgery    -Cleared for OR w/Ortho today, 10/7  -One-to-one sitter  -Plan to consult PT post-op  -Will provide updates to patient's daughter Chey (health care proxy): [phone]

## 2020-10-07 NOTE — CONSULT NOTE ADULT - REASON FOR ADMISSION
blunt trauma, fall from standing, r femoral neck fx
blunt trauma, fall from standing, r femoral neck fx

## 2020-10-07 NOTE — CONSULT NOTE ADULT - SUBJECTIVE AND OBJECTIVE BOX
Patient is a 85y old  Female who presents with a chief complaint of blunt trauma, fall from standing, r femoral neck fx (07 Oct 2020 15:05)      BRIEF HOSPITAL COURSE:   84 yo f pmhx HTN, HLD, Alzheimers dementia, severe AS, nonobstructive CAD, PVD admitted s/p mechanical fall at home and sustained right femoral neck fracture.      10/5: went to cath lab for aortic balloon valvuloplasty, intraop patient sustained wide complex tachycardia with worsening aortic insuff and was monitored in MICU.    10/6: downgraded to telemetry    Events last 24 hours:   Went to OR for Hemiarthroplasty of left hip.  Seen in PACU for evaluation.       PAST MEDICAL & SURGICAL HISTORY:  Falls frequently  Parkinsonism, unspecified Parkinsonism type  Depression, unspecified depression type  Alzheimer&#x27;s dementia without behavioral disturbance, unspecified timing of dementia onset  Cholesterolosis  No significant past surgical history      Allergies  No Known Allergies      FAMILY HISTORY:  Unknown      Social History:   From home, lives with daughter       Review of Systems:  "I just need to get up, I just need to get out of here"      Vitals During Exam:   HR: 97  BP: 146/71 mmHg   RR: 16  sPO2: 98% on RA    Physical Examination:    General: Elderly, confused female, lying in bed, NAD    HEENT: NC/AT, Pupils equal, reactive to light.  Symmetric.    PULM: Symmetrical thorax expansion upon respiration.  Clear to auscultation bilaterally, no significant sputum production    CVS: Regular rate and rhythm, + murmur, No rubs, or gallops appreciated    ABD: Soft, nondistended, nontender, normoactive bowel sounds, no masses appreciated    EXT: nonpitting edema, nontender    SKIN: Warm and well perfused, no rashes noted.    NEURO: Alert, confused      Medications:  ceFAZolin   IVPB 2000 milliGRAM(s) IV Intermittent <User Schedule>  amLODIPine   Tablet 5 milliGRAM(s) Oral daily  losartan 50 milliGRAM(s) Oral daily  acetaminophen   Tablet .. 650 milliGRAM(s) Oral every 6 hours PRN  carbidopa/levodopa  25/100 1 Tablet(s) Oral three times a day  donepezil 5 milliGRAM(s) Oral daily  donepezil 5 milliGRAM(s) Oral at bedtime  fentaNYL    Injectable 25 MICROGram(s) IV Push every 5 minutes PRN  fentaNYL    Injectable 50 MICROGram(s) IV Push every 5 minutes PRN  melatonin 3 milliGRAM(s) Oral at bedtime  memantine 10 milliGRAM(s) Oral two times a day  ondansetron Injectable 4 milliGRAM(s) IV Push once PRN  sertraline 100 milliGRAM(s) Oral daily  enoxaparin Injectable 40 milliGRAM(s) SubCutaneous daily  atorvastatin 20 milliGRAM(s) Oral at bedtime  cholecalciferol 2000 Unit(s) Oral every 48 hours  lactated ringers. 1000 milliLiter(s) IV Continuous <Continuous>  chlorhexidine 2% Cloths 1 Application(s) Topical daily  lidocaine   Patch 1 Patch Transdermal daily      ICU Vital Signs Last 24 Hrs  T(C): 36.2 (07 Oct 2020 15:55), Max: 37 (06 Oct 2020 21:12)  T(F): 97.2 (07 Oct 2020 15:55), Max: 98.6 (06 Oct 2020 21:12)  HR: 93 (07 Oct 2020 15:55) (70 - 94)  BP: 142/61 (07 Oct 2020 15:55) (137/60 - 172/66)  BP(mean): --  ABP: --  ABP(mean): --  RR: 22 (07 Oct 2020 15:55) (14 - 22)  SpO2: 100% (07 Oct 2020 15:55) (96% - 100%)    Vital Signs Last 24 Hrs  T(C): 36.2 (07 Oct 2020 15:55), Max: 37 (06 Oct 2020 21:12)  T(F): 97.2 (07 Oct 2020 15:55), Max: 98.6 (06 Oct 2020 21:12)  HR: 93 (07 Oct 2020 15:55) (70 - 94)  BP: 142/61 (07 Oct 2020 15:55) (137/60 - 172/66)  BP(mean): --  RR: 22 (07 Oct 2020 15:55) (14 - 22)  SpO2: 100% (07 Oct 2020 15:55) (96% - 100%)      I&O's Detail    06 Oct 2020 07:01  -  07 Oct 2020 07:00  --------------------------------------------------------  IN:    IV PiggyBack: 100 mL    Oral Fluid: 1320 mL  Total IN: 1420 mL    OUT:    Incontinent per Collection Bag (mL): 1100 mL  Total OUT: 1100 mL  Total NET: 320 mL      LABS:                        11.3   5.89  )-----------( 138      ( 07 Oct 2020 07:30 )             33.2     10-07    140  |  105  |  16.0  ----------------------------<  105<H>  3.8   |  22.0  |  0.72    Ca    9.2      07 Oct 2020 07:30  Phos  2.6     10-07  Mg     2.2     10-07    TPro  6.6  /  Alb  3.8  /  TBili  1.8  /  DBili  x   /  AST  16  /  ALT  7   /  AlkPhos  119  10-06      CAPILLARY BLOOD GLUCOSE  POCT Blood Glucose.: 133 mg/dL (07 Oct 2020 13:42)      PT/INR - ( 07 Oct 2020 07:30 )   PT: 12.8 sec;   INR: 1.11 ratio    PTT - ( 07 Oct 2020 07:30 )  PTT:29.8 sec      CULTURES:  Culture Results:   >100,000 CFU/ml Gram Negative Rods (10-06 @ 06:31)      RADIOLOGY:   < from: Xray Hip w/ Pelvis 1 View, Right (10.07.20 @ 13:43) >   EXAM:  XR HIP WITH PELV 1V RT                          PROCEDURE DATE:  10/07/2020      INTERPRETATION:  HISTORY: Hemiarthroplasty    TECHNIQUE: Two views of the RIGHT hip are submitted.    Findings: Evaluation demonstrates both femoral and acetabular components well-seated and in good anatomic alignment.    There is no fracture.    The visualized pelvis is within normal limits. A surgical drain is identified.    Impression:  Prosthetic Hip replacement in proper anatomical alignment..    SUNNI TAMAYO M.D., ATTENDING RADIOLOGIST  This document has been electronically signed. Oct  7 2020  3:09PM    < end of copied text >      SUPPLEMENTAL O2: RA  LINES: Peripheral   IVF: N  KAY: N  PPx: --  CONTACT: --

## 2020-10-07 NOTE — PRE PROCEDURE NOTE - PRE PROCEDURE EVALUATION
Preoperative Note    Preop Dx: Right femoral neck fracture  Surgeon: Lincoln Mojica MD  Procedure: Right hip arthroplasty    Vital Signs Last 24 Hrs  T(C): 36.7 (07 Oct 2020 05:07), Max: 37 (06 Oct 2020 21:12)  T(F): 98.1 (07 Oct 2020 05:07), Max: 98.6 (06 Oct 2020 21:12)  HR: 92 (07 Oct 2020 05:07) (70 - 92)  BP: 150/57 (07 Oct 2020 05:07) (99/56 - 157/77)  BP(mean): 83 (06 Oct 2020 16:00) (73 - 94)  RR: 18 (07 Oct 2020 05:07) (18 - 34)  SpO2: 96% (07 Oct 2020 05:07) (94% - 100%)                        12.1   6.86  )-----------( 148      ( 06 Oct 2020 05:50 )             37.0     10-06    141  |  104  |  18.0  ----------------------------<  102<H>  4.1   |  23.0  |  0.76    Ca    9.7      06 Oct 2020 05:50  Phos  3.0     10-06  Mg     2.5     10-06    TPro  6.6  /  Alb  3.8  /  TBili  1.8  /  DBili  x   /  AST  16  /  ALT  7   /  AlkPhos  119  10-06    Coags: 10/7 pending; 10/4: INR 1.10, aPTT 28.4  EKG: Reviewed  CXR: Not performed  Type and Screen: 10/5: O negative        A/P: 85y Female w/Right femoral neck fx s/p fall from standing, now scheduled for right hip arthroplasty    - OR 10/07/20 for Right hip arthroplasty with Dr. Mojica  - NPO past midnight, except medications  - IVF while NPO  - Morning Labs: CBC, BMP, coags, type & screen  - Diabetic orders adjusted for NPO period.  - Consent signed and in chart  - Medical clearance for OR

## 2020-10-07 NOTE — PROGRESS NOTE ADULT - SUBJECTIVE AND OBJECTIVE BOX
HPI/OVERNIGHT EVENTS: NAEON. Patient stable in MICU and downgraded overnight. Patient is poorly oriented and largely unable to participate in her care, but does note that she has been tolerating PO without issue and only complains of right hip pain. Denies chest pain, palpitations, SOB, f/c/n/v/d.    MEDICATIONS  (STANDING):  amLODIPine   Tablet 5 milliGRAM(s) Oral daily  atorvastatin 20 milliGRAM(s) Oral at bedtime  carbidopa/levodopa  25/100 1 Tablet(s) Oral three times a day  cefTRIAXone   IVPB 1000 milliGRAM(s) IV Intermittent every 24 hours  cholecalciferol 2000 Unit(s) Oral every 48 hours  donepezil 5 milliGRAM(s) Oral daily  donepezil 5 milliGRAM(s) Oral at bedtime  lactated ringers. 1000 milliLiter(s) (60 mL/Hr) IV Continuous <Continuous>  losartan 50 milliGRAM(s) Oral daily  melatonin 3 milliGRAM(s) Oral at bedtime  memantine 10 milliGRAM(s) Oral two times a day  povidone iodine 5% Nasal Swab 1 Application(s) Both Nostrils once  sertraline 100 milliGRAM(s) Oral daily  tranexamic acid IVPB 1000 milliGRAM(s) IV Intermittent once  tranexamic acid IVPB 1000 milliGRAM(s) IV Intermittent once    MEDICATIONS  (PRN):  acetaminophen   Tablet .. 650 milliGRAM(s) Oral every 6 hours PRN Moderate Pain (4 - 6)      Vital Signs Last 24 Hrs  T(C): 36.8 (06 Oct 2020 23:19), Max: 37.1 (06 Oct 2020 04:12)  T(F): 98.2 (06 Oct 2020 23:19), Max: 98.7 (06 Oct 2020 04:12)  HR: 81 (06 Oct 2020 23:19) (64 - 92)  BP: 137/60 (06 Oct 2020 23:19) (99/56 - 163/66)  BP(mean): 83 (06 Oct 2020 16:00) (73 - 107)  RR: 20 (06 Oct 2020 23:19) (15 - 34)  SpO2: 97% (06 Oct 2020 23:19) (92% - 100%)    Gen: Laying in bed, NAD. Alert but poorly oriented  HEENT: EOMI / PERRL b/l, no active drainage or redness  Neck: No JVD, full ROM without pain  Pulm: CTAB w/o wheezes/rales/rhonchi  CV: 3/6 holosystolic murmur best heard at RUSB c/w known history of AS. Regular rate  Abd: Soft, NTND  Extremities: TTP along right trochanter. Otherwise atraumatic      I&O's Detail    05 Oct 2020 07:01  -  06 Oct 2020 07:00  --------------------------------------------------------  IN:    Lactated Ringers: 360 mL    Oral Fluid: 120 mL  Total IN: 480 mL    OUT:    Voided (mL): 315 mL  Total OUT: 315 mL    Total NET: 165 mL      06 Oct 2020 07:01  -  07 Oct 2020 01:04  --------------------------------------------------------  IN:    IV PiggyBack: 100 mL    Oral Fluid: 1320 mL  Total IN: 1420 mL    OUT:    Incontinent per Collection Bag (mL): 1100 mL  Total OUT: 1100 mL    Total NET: 320 mL          LABS:                        12.1   6.86  )-----------( 148      ( 06 Oct 2020 05:50 )             37.0     10-06    141  |  104  |  18.0  ----------------------------<  102<H>  4.1   |  23.0  |  0.76    Ca    9.7      06 Oct 2020 05:50  Phos  3.0     10-06  Mg     2.5     10-06    TPro  6.6  /  Alb  3.8  /  TBili  1.8  /  DBili  x   /  AST  16  /  ALT  7   /  AlkPhos  119  10-06

## 2020-10-07 NOTE — CONSULT NOTE ADULT - ASSESSMENT
86 yo f pmhx HTN, HLD, Alzheimers dementia, severe AS, nonobstructive CAD, PVD admitted s/p mechanical fall at home and sustained right femoral neck fracture now POD#0 from hemiarthroplasty of left hip.     RECOMMENDATIONS:   -patient HD stable  -confused, suggest constant observation  -suggest redirection as possible  -suggest continuing alzheimer meds  -suggest telemetry monitoring   -suggest pain control as needed    Patient seen in PACU, vitals/chart/medications reviewed, case discussed with MICU attending Dr. Jamison. At this time patient does not require MICU admission, recommendations as above.
Patient is an 86 y/o woman with Parkinson's dementia with gait instability and recurrent fall, hypertension, dyslipidemia, mild non-obstructive CAD, severe AS pending consideration for possible TAVR, PVD (right iliac stenosis and possible chronic thrombus of the left iliac), precluding femoral approach for TAVR, who lives with 24 hour aides who presents post mechanical fall found to have a non-displaced impacted right subcapital femoral neck fracture for which she is pending consideration for operative repair.   She is difficult to orient however reports buttock/hip pain.      Hip fracture complicated by Severe AS  - given her severe valvulopathy would prefer minimally invasive approach to repair, consider screw fixation if possible, if patient requires hemiarthroplasty, would prefer pre-operative BAV  - Patient is at elevated risk for any surgical procedure, however, presently is without ischemia or decompensated heart failure, and is optimized from a cardiovascular perspective to proceed to the OR to restore mobility and decreased morbidity related to unrepaired hip fracture   - avoid large hemodynamic shifts  - avoid spinal anesthesia   - post operative telemetry/hemodynamic monitoring       Discussed with ortho PA, anesthesia, nursing staff and patient's family extensively     
Pt is an 86 y/o female with PMHx of HTN, HLD, Alzheimers dementia, severe AS, nonobstructive mild CAD and PVD here with acute right femoral neck fracture in setting of severe AS, now s/p balloon valvuloplasty c/b WCT    Plan:  - Admit to MICU for overnight monitoring  - High risk for arrythmia  - S/p lyte repletion in cath lab, check lytes in AM, no arrythmia noted at present  - EKG reviewed, no st/t wave changes, no CP or any acute complaints at present  - Repeat EKG and TTE in AM  - Restart home meds, including anti-HTN  - Monitor groin access site for hematoma/bleeding  - Bed rest  - Able to lift head up @1700, can start dash diet at that time    Dispo: Full code. Admit to MICU, d/w ICU attending Dr Jamison

## 2020-10-07 NOTE — PROGRESS NOTE ADULT - ASSESSMENT
Patient is an 86 yo F with h/o Parkinsons & Alzheimer's who lives alone w/ 24/7 nursing aid fell at home. She normally mobilizes with walker and requires assistance for daily living. She was c/o pain to daughter and unable to bare weight. Pt was admitted to trauma service for right hip fracture. Cardiology evaluated pt and for Severe AS. Pt is s/p aorta valvuloplasty. Post procedure pt had wide complex  tachycardia and upgraded to MICU. Potassium and magnesium replenishing to keep K>4 and Mag>2. Pt stable and now downgraded to medical service. She was seen this am prior to going to the OR     Right hip fracture - for OR today   -cleared by cardio  -pain meds and post op care per ortho   -Will need to go to SICU for 24 hours post op per rec of cardio given recent BAV     Severe Aortic Stenosis s/p aortic balloon valvuloplasty  complicated by wide complex tachycardia   - cardio seeing patient   -c/w bp control   -c/w statin   -c/w monitor    UTI - c/w rocephin, added bacid   -GNR on urine culture     Htn/Hld - c/w norvasc,, losartan  -c/w statin  -monitor bp     Parkinsons/Alzheimers dementia  with depression   -c/w sinemet, donepezil, memantine  -c/w zoloft   -monitor behavioral status     Normocytic anemia - likely aocd   -monitor hgb post op     DVT ppx - sqh     Dispo - d/c pending overnight. Prognosis guarded. Informed daughter that patient is post op and in recovery. Per cardio - planned for SICU

## 2020-10-07 NOTE — PROGRESS NOTE ADULT - SUBJECTIVE AND OBJECTIVE BOX
ORTHO-POST-OP PROGRESS NOTE:      [id number]    GENO VERA      PROCEDURE: Right hip hemiarthroplasty  Surgeon: Dr. Mojica  DOS: 10/7/20    Patient seen and examined. Patient's pain is well controlled with prescribed pain medications. Patient denies acute motor or sensory changes.  r                        11.3   5.89  )-----------( 138      ( 07 Oct 2020 07:30 )             33.2     I&O's Detail    06 Oct 2020 07:01  -  07 Oct 2020 07:00  --------------------------------------------------------  IN:    IV PiggyBack: 100 mL    Oral Fluid: 1320 mL  Total IN: 1420 mL    OUT:    Incontinent per Collection Bag (mL): 1100 mL  Total OUT: 1100 mL    Total NET: 320 mL      07 Oct 2020 07:01  -  07 Oct 2020 20:31  --------------------------------------------------------  IN:    IV PiggyBack: 50 mL  Total IN: 50 mL    OUT:    Voided (mL): 300 mL  Total OUT: 300 mL    Total NET: -250 mL      acetaminophen   Tablet .. 650 milliGRAM(s) Oral every 6 hours PRN  amLODIPine   Tablet 5 milliGRAM(s) Oral daily  atorvastatin 20 milliGRAM(s) Oral at bedtime  carbidopa/levodopa  25/100 1 Tablet(s) Oral three times a day  ceFAZolin   IVPB 2000 milliGRAM(s) IV Intermittent <User Schedule>  chlorhexidine 2% Cloths 1 Application(s) Topical daily  cholecalciferol 2000 Unit(s) Oral every 48 hours  donepezil 5 milliGRAM(s) Oral daily  donepezil 5 milliGRAM(s) Oral at bedtime  enoxaparin Injectable 40 milliGRAM(s) SubCutaneous daily  lidocaine   Patch 1 Patch Transdermal daily  losartan 50 milliGRAM(s) Oral daily  melatonin 3 milliGRAM(s) Oral at bedtime  memantine 10 milliGRAM(s) Oral two times a day  sertraline 100 milliGRAM(s) Oral daily        T(C): 36.7 (10-07-20 @ 19:11), Max: 37 (10-06-20 @ 21:12)  HR: 97 (10-07-20 @ 20:00) (70 - 97)  BP: 130/58 (10-07-20 @ 20:00) (130/58 - 172/66)  RR: 14 (10-07-20 @ 20:00) (14 - 32)  SpO2: 100% (10-07-20 @ 20:00) (96% - 100%)  Wt(kg): --      PHYSICAL EXAM:     Constitutional: Alert, responsive, in no acute distress.     Right lower Extremity:  Abduction pillow in appropriate position.  Dressing: Clean/dry/intact. No active bleeding or discharge noted. Sensation to light touch grossly intact L2-S2 distribution. Calf ost nontender and compressible. +TA/KE/EHL/FHL. Doralis pedis pulse 2+. Capillary refill is less than 3 seconds.   < from: Xray Hip w/ Pelvis 1 View, Right (10.07.20 @ 13:43) >     EXAM:  XR HIP WITH PELV 1V RT                          PROCEDURE DATE:  10/07/2020          INTERPRETATION:  HISTORY: Hemiarthroplasty    TECHNIQUE: Two views of the RIGHT hip are submitted.    Findings: Evaluation demonstrates both femoral and acetabular components well-seated and in good anatomic alignment.    There is no fracture.    The visualized pelvis is within normal limits. A surgical drain is identified.    Impression:  Prosthetic Hip replacement in proper anatomical alignment..    < end of copied text >                                                        A/P :  85y Female S/P Right hip hemiarthroplasty   POD# 0    -  Pain control as clinically indicated   -  DVT ppx: Lovenox 40QD  -  PT and out of bed today  -  Postop abx: Ancef  -  Weight bearing status: WBAT RLE  -  Posterior hip precautions   -  remainder of care as per primary team

## 2020-10-07 NOTE — PROGRESS NOTE ADULT - SUBJECTIVE AND OBJECTIVE BOX
Formerly [facility]                                   [address] [place] [address]                                                      PHONE: [phone]                                                         FAX: [phone]  [email or web address]  ---------------------------------------------------------------------------------------------------------------------------------    Overnight events/patient complaints: Downgraded from the ICU. No acute event overnight     INTERPRETATION OF TELEMETRY (personally reviewed): NSR    ECG: < from: 12 Lead ECG (10.05.20 @ 16:13) >  Normal sinus rhythm. Non-specific intra-ventricular conduction delay. ST & T wave abnormality, consider lateral ischemia. Abnormal ECG    ECHO:  POST BAV TTE < from: TTE Echo Complete w/o Contrast w/ Doppler (10.06.20 @ 09:59) >  Left Ventricle: The left ventricular internal cavity size is normal.  Global LV systolic function was normal.  Right Ventricle: Normal right ventricular size and function.  Pericardium: There is no evidence of pericardial effusion.  Mitral Valve: Mild thickening of the anterior and posterior mitral valve leaflets.  Aortic Valve: The aortic valve is trileaflet. Moderate aortic stenosis is present. The peak aortic velocity was obtained from the apical view. Moderate aortic valve regurgitation is seen.  Venous: The inferior vena cava was normal sized, with respiratory size variation greater than 50%.    CARDIAC CATHETERIZATION: < from: Cardiac Cath Lab - Adult (10.05.20 @ 14:54) > BAV performed using 21mm balloon with rapid  ventricular pacing. The mean aortic gradient dropped from 18 mm Hg to 11  mm Hg.    06 Oct 2020 07:01  -  07 Oct 2020 07:00  --------------------------------------------------------  IN: 1420 mL / OUT: 1100 mL / NET: 320 mL      PAST MEDICAL & SURGICAL HISTORY:  Falls frequently  Parkinsonism, unspecified Parkinsonism type  Depression, unspecified depression type  Alzheimer&#x27;s dementia without behavioral disturbance, unspecified timing of dementia onset  Cholesterolosis  No significant past surgical history      No Known Allergies    MEDICATIONS  (STANDING):  amLODIPine   Tablet 5 milliGRAM(s) Oral daily  atorvastatin 20 milliGRAM(s) Oral at bedtime  carbidopa/levodopa  25/100 1 Tablet(s) Oral three times a day  cefTRIAXone   IVPB 1000 milliGRAM(s) IV Intermittent every 24 hours  cholecalciferol 2000 Unit(s) Oral every 48 hours  donepezil 5 milliGRAM(s) Oral daily  donepezil 5 milliGRAM(s) Oral at bedtime  lactated ringers. 1000 milliLiter(s) (60 mL/Hr) IV Continuous <Continuous>  losartan 50 milliGRAM(s) Oral daily  melatonin 3 milliGRAM(s) Oral at bedtime  memantine 10 milliGRAM(s) Oral two times a day  povidone iodine 5% Nasal Swab 1 Application(s) Both Nostrils once  sertraline 100 milliGRAM(s) Oral daily  tranexamic acid IVPB 1000 milliGRAM(s) IV Intermittent once  tranexamic acid IVPB 1000 milliGRAM(s) IV Intermittent once    MEDICATIONS  (PRN):  acetaminophen   Tablet .. 650 milliGRAM(s) Oral every 6 hours PRN Moderate Pain (4 - 6)      Vital Signs Last 24 Hrs  T(C): 36.7 (07 Oct 2020 05:07), Max: 37 (06 Oct 2020 21:12)  T(F): 98.1 (07 Oct 2020 05:07), Max: 98.6 (06 Oct 2020 21:12)  HR: 92 (07 Oct 2020 05:07) (70 - 92)  BP: 150/57 (07 Oct 2020 05:07) (130/60 - 157/77)  BP(mean): 83 (06 Oct 2020 16:00) (83 - 94)  RR: 18 (07 Oct 2020 05:07) (18 - 21)  SpO2: 96% (07 Oct 2020 05:07) (95% - 100%)  ICU Vital Signs Last 24 Hrs    PHYSICAL EXAM:  General: Appears well developed, appear well, confused   HEENT: Head; normocephalic, atraumatic. Pupils reactive, cornea wnl. Neck supple, no nodes adenopathy, no JVD  CARDIOVASCULAR: Normal S1 and S2, 2/6 NIR, no rub, gallop or lift.   LUNGS: No rales, rhonchi or wheeze. Normal breath sounds bilaterally.  ABDOMEN: Soft, nontender without mass or organomegaly. bowel sounds normoactive.  EXTREMITIES: No clubbing, cyanosis or edema.   SKIN: warm and dry with normal turgor.  NEURO: Alert/oriented x 1/normal motor exam.   PSYCH: normal affect.        LABS:                        11.3   5.89  )-----------( 138      ( 07 Oct 2020 07:30 )             33.2     10-07    140  |  105  |  16.0  ----------------------------<  105<H>  3.8   |  22.0  |  0.72    Ca    9.2      07 Oct 2020 07:30  Phos  2.6     10-07  Mg     2.2     10-07    TPro  6.6  /  Alb  3.8  /  TBili  1.8  /  DBili  x   /  AST  16  /  ALT  7   /  AlkPhos  119  10-06    GENO VERA      PT/INR - ( 07 Oct 2020 07:30 )   PT: 12.8 sec;   INR: 1.11 ratio         PTT - ( 07 Oct 2020 07:30 )  PTT:29.8 sec      RADIOLOGY & ADDITIONAL STUDIES:    ASSESSMENT AND PLAN:  Patient is an 86 y/o woman with Parkinson's dementia with gait instability and recurrent fall, hypertension, dyslipidemia, mild non-obstructive CAD, severe AS pending consideration for possible TAVR, PVD (right iliac stenosis and possible chronic thrombus of the left iliac), precluding femoral approach for TAVR, who lives with 24 hour aides who presents post mechanical fall found to have a non-displaced impacted right subcapital femoral neck fracture for which she is pending consideration for operative repair. She is s/p BAV and is now with reduced transaortic gradients       Hip fracture complicated by Severe AS s/p BAV and now pending ORIF   - Patient is at elevated risk for any surgical procedure, however, presently is without ischemia or decompensated heart failure, and is optimized from a cardiovascular perspective to proceed to the OR to restore mobility and decreased morbidity related to unrepaired hip fracture   - avoid large hemodynamic shifts  - post operative telemetry/hemodynamic monitoring: SICU     We will continue to follow this patient while admitted     Thank you for allowing [facility] to participate in the care of this patient.  Please feel free to call with any questions or concerns.

## 2020-10-07 NOTE — PRE PROCEDURE NOTE - ATTENDING COMMENTS
The patient was seen and examined  Events noted  No new problems  Appreciate assistance from cardiology  R LE N/V intact  The patient is cleared for operation by trauma  DVT prophylaxis

## 2020-10-07 NOTE — PROGRESS NOTE ADULT - SUBJECTIVE AND OBJECTIVE BOX
CC: right hip fracture    Patient seen and examined at bedside. No acute distress and no acute overnight events. Unable to get a history from patient. States that she is doing fine. Answers questions not in line with the question that is asked.     ROS: Says no to everything - not reliable     Tele: SR     Vital Signs Last 24 Hrs  T(C): 36.2 (07 Oct 2020 13:40), Max: 37 (06 Oct 2020 21:12)  T(F): 97.2 (07 Oct 2020 13:40), Max: 98.6 (06 Oct 2020 21:12)  HR: 72 (07 Oct 2020 14:40) (70 - 92)  BP: 150/51 (07 Oct 2020 14:40) (137/60 - 172/66)  BP(mean): 83 (06 Oct 2020 16:00) (83 - 83)  RR: 17 (07 Oct 2020 14:40) (14 - 21)  SpO2: 100% (07 Oct 2020 14:40) (95% - 100%)    Exam:   Gen: elderly woman, alert and oriented to self only   HEENT: eomi, mmm, Mild pallor   CVS: S1S2nl no m/r/g RRR  Lungs: fair b/l ae   GI: Soft, Nt. bs + and distended   Ext: Right thigh internally rotated with small bruise noted on knee. Motor unable to be assessed   Neuro: as above. Moves Left side and RUE with motor in b/l UE 4/5     Labs:                         11.3   5.89  )-----------( 138      ( 07 Oct 2020 07:30 )             33.2   10-07    140  |  105  |  16.0  ----------------------------<  105<H>  3.8   |  22.0  |  0.72    Ca    9.2      07 Oct 2020 07:30  Phos  2.6     10-07  Mg     2.2     10-07    TPro  6.6  /  Alb  3.8  /  TBili  1.8  /  DBili  x   /  AST  16  /  ALT  7   /  AlkPhos  119  10-06    MEDICATIONS  (STANDING):  ceFAZolin   IVPB 2000 milliGRAM(s) IV Intermittent <User Schedule>  lactated ringers. 1000 milliLiter(s) (75 mL/Hr) IV Continuous <Continuous>    MEDICATIONS  (PRN):  fentaNYL    Injectable 25 MICROGram(s) IV Push every 5 minutes PRN Moderate Pain (4 - 6)  fentaNYL    Injectable 50 MICROGram(s) IV Push every 5 minutes PRN Severe Pain (7 - 10)  ondansetron Injectable 4 milliGRAM(s) IV Push once PRN Nausea and/or Vomiting

## 2020-10-08 LAB
-  AMIKACIN: SIGNIFICANT CHANGE UP
-  AMOXICILLIN/CLAVULANIC ACID: SIGNIFICANT CHANGE UP
-  AMPICILLIN/SULBACTAM: SIGNIFICANT CHANGE UP
-  AMPICILLIN: SIGNIFICANT CHANGE UP
-  AZTREONAM: SIGNIFICANT CHANGE UP
-  CEFAZOLIN: SIGNIFICANT CHANGE UP
-  CEFEPIME: SIGNIFICANT CHANGE UP
-  CEFOXITIN: SIGNIFICANT CHANGE UP
-  CEFTRIAXONE: SIGNIFICANT CHANGE UP
-  CIPROFLOXACIN: SIGNIFICANT CHANGE UP
-  ERTAPENEM: SIGNIFICANT CHANGE UP
-  GENTAMICIN: SIGNIFICANT CHANGE UP
-  IMIPENEM: SIGNIFICANT CHANGE UP
-  LEVOFLOXACIN: SIGNIFICANT CHANGE UP
-  MEROPENEM: SIGNIFICANT CHANGE UP
-  NITROFURANTOIN: SIGNIFICANT CHANGE UP
-  PIPERACILLIN/TAZOBACTAM: SIGNIFICANT CHANGE UP
-  TIGECYCLINE: SIGNIFICANT CHANGE UP
-  TOBRAMYCIN: SIGNIFICANT CHANGE UP
-  TRIMETHOPRIM/SULFAMETHOXAZOLE: SIGNIFICANT CHANGE UP
ANION GAP SERPL CALC-SCNC: 12 MMOL/L — SIGNIFICANT CHANGE UP (ref 5–17)
ANISOCYTOSIS BLD QL: SLIGHT — SIGNIFICANT CHANGE UP
BASOPHILS # BLD AUTO: 0 K/UL — SIGNIFICANT CHANGE UP (ref 0–0.2)
BASOPHILS NFR BLD AUTO: 0 % — SIGNIFICANT CHANGE UP (ref 0–2)
BUN SERPL-MCNC: 22 MG/DL — HIGH (ref 8–20)
CALCIUM SERPL-MCNC: 9 MG/DL — SIGNIFICANT CHANGE UP (ref 8.6–10.2)
CHLORIDE SERPL-SCNC: 104 MMOL/L — SIGNIFICANT CHANGE UP (ref 98–107)
CO2 SERPL-SCNC: 22 MMOL/L — SIGNIFICANT CHANGE UP (ref 22–29)
CREAT SERPL-MCNC: 1.02 MG/DL — SIGNIFICANT CHANGE UP (ref 0.5–1.3)
CULTURE RESULTS: SIGNIFICANT CHANGE UP
EOSINOPHIL # BLD AUTO: 0 K/UL — SIGNIFICANT CHANGE UP (ref 0–0.5)
EOSINOPHIL NFR BLD AUTO: 0 % — SIGNIFICANT CHANGE UP (ref 0–6)
GLUCOSE SERPL-MCNC: 116 MG/DL — HIGH (ref 70–99)
HCT VFR BLD CALC: 27.2 % — LOW (ref 34.5–45)
HGB BLD-MCNC: 9.4 G/DL — LOW (ref 11.5–15.5)
HYPOCHROMIA BLD QL: SLIGHT — SIGNIFICANT CHANGE UP
LYMPHOCYTES # BLD AUTO: 0.39 K/UL — LOW (ref 1–3.3)
LYMPHOCYTES # BLD AUTO: 3.5 % — LOW (ref 13–44)
MAGNESIUM SERPL-MCNC: 2 MG/DL — SIGNIFICANT CHANGE UP (ref 1.6–2.6)
MANUAL SMEAR VERIFICATION: SIGNIFICANT CHANGE UP
MCHC RBC-ENTMCNC: 30 PG — SIGNIFICANT CHANGE UP (ref 27–34)
MCHC RBC-ENTMCNC: 34.6 GM/DL — SIGNIFICANT CHANGE UP (ref 32–36)
MCV RBC AUTO: 86.9 FL — SIGNIFICANT CHANGE UP (ref 80–100)
METHOD TYPE: SIGNIFICANT CHANGE UP
MONOCYTES # BLD AUTO: 1.55 K/UL — HIGH (ref 0–0.9)
MONOCYTES NFR BLD AUTO: 13.9 % — SIGNIFICANT CHANGE UP (ref 2–14)
NEUTROPHILS # BLD AUTO: 9.2 K/UL — HIGH (ref 1.8–7.4)
NEUTROPHILS NFR BLD AUTO: 79.1 % — HIGH (ref 43–77)
NEUTS BAND # BLD: 3.5 % — SIGNIFICANT CHANGE UP (ref 0–8)
ORGANISM # SPEC MICROSCOPIC CNT: SIGNIFICANT CHANGE UP
ORGANISM # SPEC MICROSCOPIC CNT: SIGNIFICANT CHANGE UP
OVALOCYTES BLD QL SMEAR: SLIGHT — SIGNIFICANT CHANGE UP
PHOSPHATE SERPL-MCNC: 3.2 MG/DL — SIGNIFICANT CHANGE UP (ref 2.4–4.7)
PLAT MORPH BLD: NORMAL — SIGNIFICANT CHANGE UP
PLATELET # BLD AUTO: 163 K/UL — SIGNIFICANT CHANGE UP (ref 150–400)
POIKILOCYTOSIS BLD QL AUTO: SLIGHT — SIGNIFICANT CHANGE UP
POTASSIUM SERPL-MCNC: 3.8 MMOL/L — SIGNIFICANT CHANGE UP (ref 3.5–5.3)
POTASSIUM SERPL-SCNC: 3.8 MMOL/L — SIGNIFICANT CHANGE UP (ref 3.5–5.3)
RBC # BLD: 3.13 M/UL — LOW (ref 3.8–5.2)
RBC # FLD: 12.8 % — SIGNIFICANT CHANGE UP (ref 10.3–14.5)
RBC BLD AUTO: SIGNIFICANT CHANGE UP
SCHISTOCYTES BLD QL AUTO: SLIGHT — SIGNIFICANT CHANGE UP
SODIUM SERPL-SCNC: 138 MMOL/L — SIGNIFICANT CHANGE UP (ref 135–145)
SPECIMEN SOURCE: SIGNIFICANT CHANGE UP
WBC # BLD: 11.14 K/UL — HIGH (ref 3.8–10.5)
WBC # FLD AUTO: 11.14 K/UL — HIGH (ref 3.8–10.5)

## 2020-10-08 PROCEDURE — 99233 SBSQ HOSP IP/OBS HIGH 50: CPT

## 2020-10-08 RX ORDER — LIDOCAINE 4 G/100G
1 CREAM TOPICAL DAILY
Refills: 0 | Status: DISCONTINUED | OUTPATIENT
Start: 2020-10-08 | End: 2020-10-09

## 2020-10-08 RX ORDER — TRAMADOL HYDROCHLORIDE 50 MG/1
25 TABLET ORAL ONCE
Refills: 0 | Status: DISCONTINUED | OUTPATIENT
Start: 2020-10-08 | End: 2020-10-08

## 2020-10-08 RX ORDER — SODIUM CHLORIDE 9 MG/ML
1000 INJECTION, SOLUTION INTRAVENOUS
Refills: 0 | Status: DISCONTINUED | OUTPATIENT
Start: 2020-10-08 | End: 2020-10-08

## 2020-10-08 RX ORDER — CEFTRIAXONE 500 MG/1
1000 INJECTION, POWDER, FOR SOLUTION INTRAMUSCULAR; INTRAVENOUS EVERY 24 HOURS
Refills: 0 | Status: DISCONTINUED | OUTPATIENT
Start: 2020-10-09 | End: 2020-10-09

## 2020-10-08 RX ORDER — OXYCODONE HYDROCHLORIDE 5 MG/1
2.5 TABLET ORAL EVERY 6 HOURS
Refills: 0 | Status: DISCONTINUED | OUTPATIENT
Start: 2020-10-08 | End: 2020-10-09

## 2020-10-08 RX ORDER — CEFTRIAXONE 500 MG/1
INJECTION, POWDER, FOR SOLUTION INTRAMUSCULAR; INTRAVENOUS
Refills: 0 | Status: DISCONTINUED | OUTPATIENT
Start: 2020-10-08 | End: 2020-10-09

## 2020-10-08 RX ORDER — ACETAMINOPHEN 500 MG
650 TABLET ORAL EVERY 6 HOURS
Refills: 0 | Status: DISCONTINUED | OUTPATIENT
Start: 2020-10-08 | End: 2020-10-09

## 2020-10-08 RX ORDER — ACETAMINOPHEN 500 MG
1000 TABLET ORAL ONCE
Refills: 0 | Status: COMPLETED | OUTPATIENT
Start: 2020-10-08 | End: 2020-10-08

## 2020-10-08 RX ORDER — KETOROLAC TROMETHAMINE 30 MG/ML
15 SYRINGE (ML) INJECTION EVERY 8 HOURS
Refills: 0 | Status: DISCONTINUED | OUTPATIENT
Start: 2020-10-08 | End: 2020-10-09

## 2020-10-08 RX ORDER — ACETAMINOPHEN 500 MG
975 TABLET ORAL EVERY 8 HOURS
Refills: 0 | Status: DISCONTINUED | OUTPATIENT
Start: 2020-10-08 | End: 2020-10-09

## 2020-10-08 RX ORDER — POTASSIUM CHLORIDE 20 MEQ
20 PACKET (EA) ORAL ONCE
Refills: 0 | Status: COMPLETED | OUTPATIENT
Start: 2020-10-08 | End: 2020-10-08

## 2020-10-08 RX ORDER — LACTOBACILLUS ACIDOPHILUS 100MM CELL
1 CAPSULE ORAL
Refills: 0 | Status: DISCONTINUED | OUTPATIENT
Start: 2020-10-08 | End: 2020-10-09

## 2020-10-08 RX ORDER — POTASSIUM CHLORIDE 20 MEQ
20 PACKET (EA) ORAL ONCE
Refills: 0 | Status: DISCONTINUED | OUTPATIENT
Start: 2020-10-08 | End: 2020-10-08

## 2020-10-08 RX ORDER — CEFTRIAXONE 500 MG/1
1000 INJECTION, POWDER, FOR SOLUTION INTRAMUSCULAR; INTRAVENOUS ONCE
Refills: 0 | Status: COMPLETED | OUTPATIENT
Start: 2020-10-08 | End: 2020-10-08

## 2020-10-08 RX ADMIN — ENOXAPARIN SODIUM 40 MILLIGRAM(S): 100 INJECTION SUBCUTANEOUS at 11:42

## 2020-10-08 RX ADMIN — Medication 975 MILLIGRAM(S): at 23:45

## 2020-10-08 RX ADMIN — ATORVASTATIN CALCIUM 20 MILLIGRAM(S): 80 TABLET, FILM COATED ORAL at 23:44

## 2020-10-08 RX ADMIN — Medication 400 MILLIGRAM(S): at 09:08

## 2020-10-08 RX ADMIN — SODIUM CHLORIDE 100 MILLILITER(S): 9 INJECTION, SOLUTION INTRAVENOUS at 09:08

## 2020-10-08 RX ADMIN — CHLORHEXIDINE GLUCONATE 1 APPLICATION(S): 213 SOLUTION TOPICAL at 11:43

## 2020-10-08 RX ADMIN — Medication 1000 MILLIGRAM(S): at 10:00

## 2020-10-08 RX ADMIN — CARBIDOPA AND LEVODOPA 1 TABLET(S): 25; 100 TABLET ORAL at 13:32

## 2020-10-08 RX ADMIN — TRAMADOL HYDROCHLORIDE 25 MILLIGRAM(S): 50 TABLET ORAL at 04:51

## 2020-10-08 RX ADMIN — CARBIDOPA AND LEVODOPA 1 TABLET(S): 25; 100 TABLET ORAL at 04:52

## 2020-10-08 RX ADMIN — DONEPEZIL HYDROCHLORIDE 5 MILLIGRAM(S): 10 TABLET, FILM COATED ORAL at 23:43

## 2020-10-08 RX ADMIN — Medication 100 MILLIGRAM(S): at 02:19

## 2020-10-08 RX ADMIN — Medication 20 MILLIEQUIVALENT(S): at 11:42

## 2020-10-08 RX ADMIN — SERTRALINE 100 MILLIGRAM(S): 25 TABLET, FILM COATED ORAL at 11:42

## 2020-10-08 RX ADMIN — MEMANTINE HYDROCHLORIDE 10 MILLIGRAM(S): 10 TABLET ORAL at 04:52

## 2020-10-08 RX ADMIN — AMLODIPINE BESYLATE 5 MILLIGRAM(S): 2.5 TABLET ORAL at 04:52

## 2020-10-08 RX ADMIN — Medication 1 TABLET(S): at 17:14

## 2020-10-08 RX ADMIN — TRAMADOL HYDROCHLORIDE 25 MILLIGRAM(S): 50 TABLET ORAL at 05:30

## 2020-10-08 RX ADMIN — Medication 650 MILLIGRAM(S): at 01:14

## 2020-10-08 RX ADMIN — MEMANTINE HYDROCHLORIDE 10 MILLIGRAM(S): 10 TABLET ORAL at 17:13

## 2020-10-08 RX ADMIN — Medication 3 MILLIGRAM(S): at 23:44

## 2020-10-08 RX ADMIN — Medication 650 MILLIGRAM(S): at 02:10

## 2020-10-08 RX ADMIN — LIDOCAINE 1 PATCH: 4 CREAM TOPICAL at 11:42

## 2020-10-08 RX ADMIN — LIDOCAINE 1 PATCH: 4 CREAM TOPICAL at 03:33

## 2020-10-08 RX ADMIN — DONEPEZIL HYDROCHLORIDE 5 MILLIGRAM(S): 10 TABLET, FILM COATED ORAL at 11:42

## 2020-10-08 RX ADMIN — LOSARTAN POTASSIUM 50 MILLIGRAM(S): 100 TABLET, FILM COATED ORAL at 04:52

## 2020-10-08 RX ADMIN — CEFTRIAXONE 100 MILLIGRAM(S): 500 INJECTION, POWDER, FOR SOLUTION INTRAMUSCULAR; INTRAVENOUS at 17:14

## 2020-10-08 RX ADMIN — CARBIDOPA AND LEVODOPA 1 TABLET(S): 25; 100 TABLET ORAL at 23:44

## 2020-10-08 NOTE — DIETITIAN INITIAL EVALUATION ADULT. - PERTINENT LABORATORY DATA
10-08 Na138 mmol/L Glu 116 mg/dL<H> K+ 3.8 mmol/L Cr  1.02 mg/dL BUN 22.0 mg/dL<H> Phos 3.2 mg/dL Alb n/a   PAB n/a     n/a  HgbA1C

## 2020-10-08 NOTE — OCCUPATIONAL THERAPY INITIAL EVALUATION ADULT - LIVES WITH, PROFILE
pt has a HHA 24/7; pt is a poor historian, difficult to obtain social history from pt due to dementia/alone

## 2020-10-08 NOTE — DIETITIAN INITIAL EVALUATION ADULT. - MALNUTRITION
Moderate-Chronic; NFPE: Moderate muscle mass loss in temple, clavicle shoulder regions and moderate fat loss in orbit, buccal, triceps, and thoracic regions. (Generalized edema noted) Moderate-Chronic

## 2020-10-08 NOTE — DIETITIAN INITIAL EVALUATION ADULT. - ETIOLOGY
Related to inability to meet sufficient protein energy requirements in setting of alzheimer's dementia and severe aortic stenosis

## 2020-10-08 NOTE — PHYSICAL THERAPY INITIAL EVALUATION ADULT - ACTIVE RANGE OF MOTION EXAMINATION, REHAB EVAL
bilateral upper extremity Active ROM was WFL (within functional limits)/pt resistant to perform active ROM, pt demonstrates WNL passive ROM of DF/PF and knee flexion bilaterally. Left hip flexion WNL and Right hip flexion limited due to hip precautions.

## 2020-10-08 NOTE — PHYSICAL THERAPY INITIAL EVALUATION ADULT - ADDITIONAL COMMENTS
pt is a poor historian. As per chart pt has a 24/7 nurse aide to assist with ADLs and mobilizes with a walker.

## 2020-10-08 NOTE — DISCHARGE NOTE PROVIDER - HOSPITAL COURSE
Patient is an 84 yo F with h/o Parkinsons & Alzheimer's who lives alone w/ 24/7 nursing aid fell at home, and subsequently unable to bare weight. Pt was admitted to trauma service for right hip fracture. Cardiology evaluated pt and for Severe AS. Pt is s/p aorta valvuloplasty on 10/5. Post procedure pt had wide complex  tachycardia and upgraded to MICU.  Pt stable and now downgraded to gen med for her right hip hemiarthroplasty which was done on 10/7 and then was sent to the SICU, now downgraded again to gen med svc on 10/8. She is POD #2 for - s/p Right hip hemiarthroplasty.   She also had post op leucocytosis which resolved. During her hospital stay, noted to have mildly elevated BUN for which her toradol meds were then taken off and she was encouraged to orally hydrate.      Normocytic anemia noted with likely acute blood loss anemia and a hgb of 8.2. No e/o gross ongoing blood loss.   Prior to having hip surgery, noted to have severe Aortic Stenosis s/p aortic balloon valvuloplasty (on 10/5). During her hospital stay she was also noted to have a UTI for which she is on day 3 out of 7 of rocephin. She was found to have ecoli in the urine which is pansensitive. Her blood pressure meds were taken off due to soft bp. ALl rest of meds were kept the same.     Plan for d/c to facility today. Her daughter wanted to see her first with eval with PT prior to saying okay to rehab.     Total time coordinating this discharge was 40 minutes.

## 2020-10-08 NOTE — PROGRESS NOTE ADULT - SUBJECTIVE AND OBJECTIVE BOX
GENO CHUY    [id number]    History: Patient 86 yo female with dementia status post right hip hemiarthroplasty on 10/7/20, POD # 1  Patient seen in SICU resting comfortably in bed in NAD  Patient c/o operative site pain  No other complaints    Vital Signs Last 24 Hrs  T(C): 36.7 (08 Oct 2020 04:00), Max: 36.8 (07 Oct 2020 23:52)  T(F): 98 (08 Oct 2020 04:00), Max: 98.3 (07 Oct 2020 23:52)  HR: 76 (08 Oct 2020 06:00) (69 - 102)  BP: 130/58 (08 Oct 2020 06:00) (108/54 - 172/66)  BP(mean): 84 (08 Oct 2020 06:00) (78 - 101)  RR: 28 (08 Oct 2020 06:00) (14 - 32)  SpO2: 97% (08 Oct 2020 06:00) (96% - 100%)                        9.4    11.14 )-----------( 163      ( 08 Oct 2020 04:56 )             27.2     10-08    138  |  104  |  22.0<H>  ----------------------------<  116<H>  3.8   |  22.0  |  1.02    Ca    9.0      08 Oct 2020 04:56  Phos  3.2     10-08  Mg     2.0     10-08    MEDICATIONS  (STANDING):  amLODIPine   Tablet 5 milliGRAM(s) Oral daily  atorvastatin 20 milliGRAM(s) Oral at bedtime  carbidopa/levodopa  25/100 1 Tablet(s) Oral three times a day  chlorhexidine 2% Cloths 1 Application(s) Topical daily  cholecalciferol 2000 Unit(s) Oral every 48 hours  donepezil 5 milliGRAM(s) Oral daily  donepezil 5 milliGRAM(s) Oral at bedtime  enoxaparin Injectable 40 milliGRAM(s) SubCutaneous daily  lidocaine   Patch 1 Patch Transdermal daily  losartan 50 milliGRAM(s) Oral daily  melatonin 3 milliGRAM(s) Oral at bedtime  memantine 10 milliGRAM(s) Oral two times a day  multiple electrolytes Injection Type 1 1000 milliLiter(s) (100 mL/Hr) IV Continuous <Continuous>  potassium chloride   Powder 20 milliEquivalent(s) Oral once  sertraline 100 milliGRAM(s) Oral daily    MEDICATIONS  (PRN):  acetaminophen   Tablet .. 650 milliGRAM(s) Oral every 6 hours PRN Mild Pain (1 - 3)    Physical exam: Alert and able to follow command. The right hip dressing is clean, dry and intact. No drainage or discharge. No erythema is noted. No blistering. No ecchymosis. Hip abduction pillow in place. The calf is supple nontender. Passive range of motion is acceptable to due postoperative pain. Sensation to light touch is grossly intact distally. Motor function distally is 5/5. No foot drop. 2+ dorsalis pedis pulse. Capillary refill is less than 2 seconds. No cyanosis.    Primary Orthopedic Assessment:  • s/p RIGHT hip hemiarthroplasty    Plan:   • DVT prophylaxis as prescribed, including use of compression devices and ankle pumps  • PT/OT  • Weightbearing as tolerated of the right lower extremity with assistance of a walker  • Pain control as clinically indicated  • Posterior hip precautions   • Discharge planning   • Continue care per SICU

## 2020-10-08 NOTE — PHYSICAL THERAPY INITIAL EVALUATION ADULT - LEVEL OF INDEPENDENCE: SIT/STAND, REHAB EVAL
not safe to perform at this time, attempted to use Nasrin steady to assist with transfer, however due to cognitive status and pt resistanting transfer by leaning backward sit to stand transfer not able to be completed./unable to perform

## 2020-10-08 NOTE — PROGRESS NOTE ADULT - NUTRITIONAL ASSESSMENT
Moderate protein-calorie malnutrition  Comprehensive nutrition assessment and consultation  Food acceptance and intake status from observations by staff  Diet, Regular:   DASH/TLC {Sodium & Cholesterol Restricted} (DASH)  Supplement Feeding Modality:  Oral  Ensure Enlive Cans or Servings Per Day:  3       Frequency:  Three Times a day (10-07-20 @ 15:56) [Active]

## 2020-10-08 NOTE — PROGRESS NOTE ADULT - ASSESSMENT
Patient is an 86 yo F with h/o Parkinsons & Alzheimer's who lives alone w/ 24/7 nursing aid fell at home. She normally mobilizes with walker and requires assistance for daily living. She was c/o pain to daughter and unable to bare weight. Pt was admitted to trauma service for right hip fracture. Cardiology evaluated pt and for Severe AS. Pt is s/p aorta valvuloplasty on 10/5. Post procedure pt had wide complex  tachycardia and upgraded to MICU. Potassium and magnesium replenishing to keep K>4 and Mag>2. Pt stable and now downgraded to gen med for her right hip hemiarthroplasty which was done on 10/7 and then was sent to the SICU, now downgraded again to gen med svc.     Right hip fracture - s/p Right hip hemiarthroplasty   POD #1   post op leucocytosis noted   -PT eval noted  -pain control prn   -early ambulation   -dvt ppx, ortho on board    Severe Aortic Stenosis s/p aortic balloon valvuloplasty  complicated by wide complex tachycardia   - stable thus far, bp control noted  -c/w statin, norvasc  -would maintain on monitor for 24 more hours     UTI - c/w rocephin (received one dose on 10/6 and was not resumed post op), added bacid - Day #2/7  -Ecoli on urine culture. Will do treatment for 7 days total given age, debility, recent surgery and immobility at this time  -leucocytosis likely reactive post op, but will monitor       Htn/Hld - c/w norvasc,, losartan  -c/w statin  -monitor bp     Parkinsons/Alzheimers dementia  with depression   -c/w sinemet, donepezil, memantine  -c/w zoloft   -monitor behavioral status     Normocytic anemia - likely aocd   -monitor hgb post op     DVT ppx - sqh     Dispo - Rehab per PT - daughter wants her to "walk again". Would like to speak to PT if possible. I informed her that it is still early, we can try to ambulate her again in am and see.

## 2020-10-08 NOTE — DISCHARGE NOTE PROVIDER - CARE PROVIDER_API CALL
Lincoln Mojica)  Orthopaedic Surgery  [address] [place] [address]  Phone: [phone]  Fax: [phone]  Follow Up Time:    Lincoln Mojica)  Orthopaedic Surgery  [address] [place] [address]  Phone: [phone]  Fax: [phone]  Follow Up Time:     KEYSHA COLON  Infectious Diseases  [address] [address]  Phone: [phone]  Fax: [phone]  Follow Up Time:

## 2020-10-08 NOTE — PROGRESS NOTE ADULT - SUBJECTIVE AND OBJECTIVE BOX
CC: right hip fracture    Patient seen and examined at bedside. No acute distress and no acute overnight events. Not a good historian     ROS: Says no to everything - not reliable     Tele: SR     Vital Signs Last 24 Hrs  T(C): 36.9 (08 Oct 2020 12:00), Max: 36.9 (08 Oct 2020 08:00)  T(F): 98.5 (08 Oct 2020 12:00), Max: 98.5 (08 Oct 2020 08:00)  HR: 82 (08 Oct 2020 15:00) (68 - 102)  BP: 116/57 (08 Oct 2020 15:00) (85/67 - 169/70)  BP(mean): 82 (08 Oct 2020 15:00) (65 - 101)  RR: 16 (08 Oct 2020 15:00) (5 - 32)  SpO2: 97% (08 Oct 2020 15:00) (93% - 100%)    Exam:   Gen: elderly woman, alert and oriented to self only   HEENT: eomi, mmm, Mild pallor   CVS: S1S2nl no m/r/g RRR  Lungs: fair b/l ae   GI: Soft, Nt. bs + and distended   Ext: Right thigh internally rotated with small bruise noted on knee. Motor unable to be assessed   Neuro: as above. Moves Left side and RUE with motor in b/l UE 4/5     Labs:                         9.4    11.14 )-----------( 163      ( 08 Oct 2020 04:56 )             27.2   10-08    138  |  104  |  22.0<H>  ----------------------------<  116<H>  3.8   |  22.0  |  1.02    Ca    9.0      08 Oct 2020 04:56  Phos  3.2     10-08  Mg     2.0     10-08    MEDICATIONS  (STANDING):  amLODIPine   Tablet 5 milliGRAM(s) Oral daily  atorvastatin 20 milliGRAM(s) Oral at bedtime  carbidopa/levodopa  25/100 1 Tablet(s) Oral three times a day  chlorhexidine 2% Cloths 1 Application(s) Topical daily  cholecalciferol 2000 Unit(s) Oral every 48 hours  donepezil 5 milliGRAM(s) Oral daily  donepezil 5 milliGRAM(s) Oral at bedtime  enoxaparin Injectable 40 milliGRAM(s) SubCutaneous daily  lidocaine   Patch 1 Patch Transdermal daily  lidocaine   Patch 1 Patch Transdermal daily  melatonin 3 milliGRAM(s) Oral at bedtime  memantine 10 milliGRAM(s) Oral two times a day  multiple electrolytes Injection Type 1 1000 milliLiter(s) (100 mL/Hr) IV Continuous <Continuous>  sertraline 100 milliGRAM(s) Oral daily    MEDICATIONS  (PRN):  acetaminophen   Tablet .. 650 milliGRAM(s) Oral every 6 hours PRN Mild Pain (1 - 3)

## 2020-10-08 NOTE — PHYSICAL THERAPY INITIAL EVALUATION ADULT - FOLLOWS COMMANDS/ANSWERS QUESTIONS, REHAB EVAL
Due to cognitive status and anxiety pt very hesistant and resistant to follow commands and mobility./50% of the time

## 2020-10-08 NOTE — OCCUPATIONAL THERAPY INITIAL EVALUATION ADULT - PLANNED THERAPY INTERVENTIONS, OT EVAL
ADL retraining/balance training/bed mobility training/motor coordination training/transfer training/neuromuscular re-education/ROM/strengthening

## 2020-10-08 NOTE — PHYSICAL THERAPY INITIAL EVALUATION ADULT - LEVEL OF INDEPENDENCE: BED TO CHAIR, REHAB EVAL
unable to perform/not safe to perform at this time due to patients cognitive status and patient resisting transfers by leaning backwards.

## 2020-10-08 NOTE — DISCHARGE NOTE PROVIDER - NSDCMRMEDTOKEN_GEN_ALL_CORE_FT
acetaminophen 325 mg oral tablet: 3 tab(s) orally every 8 hours  acetaminophen 325 mg oral tablet: 2 tab(s) orally every 6 hours, As needed, Temp greater or equal to 38C (100.4F), Mild Pain (1 - 3)  aspirin 81 mg oral delayed release tablet: 1 tab(s) orally once a day  Can resume on 10/12   carbidopa-levodopa 25 mg-100 mg oral tablet: 1 tab(s) orally 3 times a day  donepezil 5 mg oral tablet: 1 tab(s) orally 2 times a day  enoxaparin: 40 milligram(s) subcutaneous once a day x 28 days post op  lactobacillus acidophilus oral capsule: 1 tab(s) orally 2 times a dayx 7 more days   lidocaine 5% topical film: Apply topically to affected area once a day to right hip   melatonin 3 mg oral tablet: 1 tab(s) orally once a day (at bedtime)  memantine 10 mg oral tablet: 1 tab(s) orally 2 times a day  oxyCODONE 5 mg oral tablet: 0.5 tab(s) orally every 6 hours as needed for severe pain   rosuvastatin 5 mg oral tablet: 1 tab(s) orally once a day  sertraline 100 mg oral tablet: 1 tab(s) orally once a day  traMADol 50 mg oral tablet: 0.5 tab(s) orally every 8 hours, As needed, Moderate Pain (4 - 6)  Vantin 100 mg oral tablet: 1 tab(s) orally every 12 hours x 5 more days   Vitamin D3 2000 intl units (50 mcg) oral capsule: 1 cap(s) orally 3 times a week

## 2020-10-08 NOTE — DISCHARGE NOTE PROVIDER - PROVIDER TOKENS
PROVIDER:[TOKEN:[[id number]]] PROVIDER:[TOKEN:[[id number]]],PROVIDER:[TOKEN:[[id number]]]

## 2020-10-08 NOTE — DISCHARGE NOTE PROVIDER - NSDCCPCAREPLAN_GEN_ALL_CORE_FT
PRINCIPAL DISCHARGE DIAGNOSIS  Diagnosis: Closed fracture of right hip, initial encounter  Assessment and Plan of Treatment:       SECONDARY DISCHARGE DIAGNOSES  Diagnosis: Falls frequently  Assessment and Plan of Treatment:      PRINCIPAL DISCHARGE DIAGNOSIS  Diagnosis: Closed fracture of right hip, initial encounter  Assessment and Plan of Treatment: s/p surgical intervention   Will need to be on lovenox for 28 days total   Pain control as above with tylenol standing and around the clock, tramadol and oxy  Early ambulation   Patient should have a repeat cbc done in 2-3 days upon arrival      SECONDARY DISCHARGE DIAGNOSES  Diagnosis: Acute UTI  Assessment and Plan of Treatment: pansensitive ecoli in ua  started on rocephin   received 3 days   continue with vantin to complete 7 day course with bacid    Diagnosis: Dementia  Assessment and Plan of Treatment: continue with home medications for alzheimers and parkinsons dementia   monitor mental status    Diagnosis: Normocytic anemia  Assessment and Plan of Treatment: with likely post op acute blood loss anemia   -no evidence of gross blood loss   -monitor cbc in 2-3 days after discharge      Diagnosis: Hyperlipidemia  Assessment and Plan of Treatment: continue with home meds    Diagnosis: Hypertension  Assessment and Plan of Treatment: we stopped losartan 50 and norvasc 5 due to lower blood pressure readings   Monitor bp and if needed can resume

## 2020-10-08 NOTE — CHART NOTE - TREATMENT: THE FOLLOWING DIET HAS BEEN RECOMMENDED
Diet, Regular:   DASH/TLC {Sodium & Cholesterol Restricted} (DASH)  Supplement Feeding Modality:  Oral  Ensure Enlive Cans or Servings Per Day:  3       Frequency:  Three Times a day (10-07-20 @ 15:56) [Active]

## 2020-10-08 NOTE — PHYSICAL THERAPY INITIAL EVALUATION ADULT - PERTINENT HX OF CURRENT PROBLEM, REHAB EVAL
85y Female with past medical history significant for dementia, severe AS, nonobstructive CAD, PVD presents with fall found to have hip fx. s/p Right hip hemiarthroplasty 10/7/2020

## 2020-10-08 NOTE — CHART NOTE - NSCHARTNOTEFT_GEN_A_CORE
SICU TRANSFER NOTE  -----------------------------  ICU Admission Date: 10/7/2020  Transfer Date: 10-08-20 @ 11:29    Admission Diagnosis: R femoral neck fracture    Active Problems/injuries: s/p R hip hemiarthroplasty, s/p aortic balloon valvuloplasty    Procedures:   R hip hemiarthroplasty 10/7  Aortic balloon valvuloplasty 10/5    Consultants: cardiology, trauma, orthopedics    Medications  acetaminophen   Tablet .. 650 milliGRAM(s) Oral every 6 hours PRN  amLODIPine   Tablet 5 milliGRAM(s) Oral daily  atorvastatin 20 milliGRAM(s) Oral at bedtime  carbidopa/levodopa  25/100 1 Tablet(s) Oral three times a day  chlorhexidine 2% Cloths 1 Application(s) Topical daily  cholecalciferol 2000 Unit(s) Oral every 48 hours  donepezil 5 milliGRAM(s) Oral daily  donepezil 5 milliGRAM(s) Oral at bedtime  enoxaparin Injectable 40 milliGRAM(s) SubCutaneous daily  lidocaine   Patch 1 Patch Transdermal daily  lidocaine   Patch 1 Patch Transdermal daily  losartan 50 milliGRAM(s) Oral daily  melatonin 3 milliGRAM(s) Oral at bedtime  memantine 10 milliGRAM(s) Oral two times a day  multiple electrolytes Injection Type 1 1000 milliLiter(s) IV Continuous <Continuous>  potassium chloride   Powder 20 milliEquivalent(s) Oral once  sertraline 100 milliGRAM(s) Oral daily      [x] I attest I have reviewed and reconciled all medications prior to transfer    IV Fluids  lactated ringers.: Solution, 1000 milliLiter(s) infuse at 75 mL/Hr  Provider's Contact #: [phone]  lactated ringers.: Solution, 1000 milliLiter(s) infuse at 60 mL/Hr  lactated ringers.: Solution, 1000 milliLiter(s) infuse at 60 mL/Hr  Provider's Contact #: [phone]  lactated ringers.: Solution, 1000 milliLiter(s) infuse at 80 mL/Hr    Indication: FRANCOISE, likely pe-renal    Antibiotics: none    I have discussed this case with Dr. Simpson upon transfer and all questions regarding ICU course were answered.  The following items are to be followed up:  - trend Cr, FRANCOISE likely pre-renal post-op  - trend leukocytosis, likely inflammatory post-op  - UA + on arrival, s/p 2 days of rocephin and 1 day of ancef, now off ABX  - losartan 50 (home med) on hold 2/2 FRANCOISE  - please call SICU [phone] with any questions or concerns.

## 2020-10-08 NOTE — DISCHARGE NOTE PROVIDER - NSDCFUADDAPPT_GEN_ALL_CORE_FT
See Dr. Ho in office upon d/c from [facility] [place]   See Dr. Mojica in office in 2 weeks     The patient will be seen in the office between 2-3 weeks for wound check. Patient may shower after post-op day #3 (10/10/20). The dressing is to be removed on day postop day #7 (10/14/20). The patient will contact the office if the wound becomes red, has increasing pain, develops bleeding or discharge, an injury occurs, or has other concerns. The patient will continue PT consistent with posterior total hip replacement protocol. The patient will continue to practice posterior total hip precautions for a minimum of 6 week. The patient will continue LOVENOX for 2 weeks and then begin ASPIRIN for DVTP. The patient is FULL weight bearing.

## 2020-10-08 NOTE — DIETITIAN INITIAL EVALUATION ADULT. - OTHER INFO
Pt is a 85 year old Female with alzheimer's dementia, severe AS s/p BAV, and nonobstructive CAD who was admitted after mechanical fall for a right femoral neck fracture. Prior to yesterday's hemiarthroplasty of right hip 10/07/20, patient had balloon aortic valvuloplasty for severe aortic stenosis which required monitoring with MICU. Cardiology recommended that patient return to ICU for cardiac monitoring post op form her orthopedic procedure, and so patient presented to SICU post-operatively.  Pt is a poor historian secondary to hx of alzheimer's dementia; however spoke with NA, reports pt requires total assistance with meals, pt ate fairly well for breakfast this morning. NFPE conducted.

## 2020-10-08 NOTE — OCCUPATIONAL THERAPY INITIAL EVALUATION ADULT - RANGE OF MOTION EXAMINATION
pt resistant to active ROM and does not want to follow commands to perform; noted observation bilat UEs grossly WFLs

## 2020-10-08 NOTE — PROGRESS NOTE ADULT - SUBJECTIVE AND OBJECTIVE BOX
24 HOUR EVENTS:  Patient admitted to SICU post-operatively for cardiac monitoring per cardiology recommendations, as she recently had balloon aortic valvuloplasty prior to yesterday's procedure, No acute overnight events. Baseline confusion/dementia. Was able to express having pain, given one time ultram. No major agitation episodes. Tolerating an oral diet. Hemodynamically stable. Voiding with prima fit in place, adequate.     SUBJECTIVE/ROS:  [ ] A ten-point review of systems was otherwise negative except as noted.  [x] Due to altered mental status/intubation, subjective information were not able to be obtained from the patient. History was obtained, to the extent possible, from review of the chart and collateral sources of information.      NEURO  RASS:     GCS: 14    CAM ICU:  Exam: Speaking nonsensically most often. Occasionally responds appropriately. Moves all extremities spontaneously.   Meds: acetaminophen   Tablet .. 650 milliGRAM(s) Oral every 6 hours PRN Mild Pain (1 - 3)  carbidopa/levodopa  25/100 1 Tablet(s) Oral three times a day  donepezil 5 milliGRAM(s) Oral daily  donepezil 5 milliGRAM(s) Oral at bedtime  melatonin 3 milliGRAM(s) Oral at bedtime  memantine 10 milliGRAM(s) Oral two times a day  sertraline 100 milliGRAM(s) Oral daily    [x] Adequacy of sedation and pain control has been assessed and adjusted      RESPIRATORY  RR: 20 (10-08-20 @ 03:00) (14 - 32)  SpO2: 100% (10-08-20 @ 03:00) (96% - 100%)  Wt(kg): --  Exam: unlabored, clear to auscultation bilaterally  Mechanical Ventilation:     [ ] Extubation Readiness Assessed  Meds:       CARDIOVASCULAR  HR: 99 (10-08-20 @ 03:00) (69 - 102)  BP: 143/63 (10-08-20 @ 03:00) (108/54 - 172/66)  BP(mean): 91 (10-08-20 @ 03:00) (78 - 101)  ABP: --  ABP(mean): --  Wt(kg): --  CVP(cm H2O): --      Exam:  Cardiac Rhythm:  Perfusion     [x]Adequate   [ ]Inadequate  Mentation   [ ]Normal       [x]Reduced  Extremities  [x]Warm         [ ]Cool  Volume Status [ ]Hypervolemic [x]Euvolemic [ ]Hypovolemic  Meds: amLODIPine   Tablet 5 milliGRAM(s) Oral daily  losartan 50 milliGRAM(s) Oral daily        GI/NUTRITION  Exam: Soft, NT, ND  Diet: DASH diet  Meds:     GENITOURINARY  I&O's Detail    10-06 @ 07:01  -  10-07 @ 07:00  --------------------------------------------------------  IN:    IV PiggyBack: 100 mL    Oral Fluid: 1320 mL  Total IN: 1420 mL    OUT:    Incontinent per Collection Bag (mL): 1100 mL  Total OUT: 1100 mL    Total NET: 320 mL      10-07 @ 07:01  -  10-08 @ 04:21  --------------------------------------------------------  IN:    IV PiggyBack: 100 mL    Oral Fluid: 200 mL  Total IN: 300 mL    OUT:    Voided (mL): 850 mL  Total OUT: 850 mL    Total NET: -550 mL          10-07    140  |  105  |  16.0  ----------------------------<  105<H>  3.8   |  22.0  |  0.72    Ca    9.2      07 Oct 2020 07:30  Phos  2.6     10-07  Mg     2.2     10-07    TPro  6.6  /  Alb  3.8  /  TBili  1.8  /  DBili  x   /  AST  16  /  ALT  7   /  AlkPhos  119  10-06    [ ] Lay catheter, indication: N/A  Meds: cholecalciferol 2000 Unit(s) Oral every 48 hours        HEMATOLOGIC  Meds: enoxaparin Injectable 40 milliGRAM(s) SubCutaneous daily    [x] VTE Prophylaxis                        11.3   5.89  )-----------( 138      ( 07 Oct 2020 07:30 )             33.2     PT/INR - ( 07 Oct 2020 07:30 )   PT: 12.8 sec;   INR: 1.11 ratio         PTT - ( 07 Oct 2020 07:30 )  PTT:29.8 sec  Transfusion     [ ] PRBC   [ ] Platelets   [ ] FFP   [ ] Cryoprecipitate      INFECTIOUS DISEASES  T(C): 36.7 (10-08-20 @ 04:00), Max: 36.8 (10-07-20 @ 23:52)  Wt(kg): --  WBC Count: 5.89 K/uL (10-07 @ 07:30)    Recent Cultures:  Specimen Source: .Urine Catheterized, 10-06 @ 06:31; Results   >100,000 CFU/ml Escherichia coli; Gram Stain: --; Organism: --    Meds:       ENDOCRINE  Capillary Blood Glucose    Meds: atorvastatin 20 milliGRAM(s) Oral at bedtime        ACCESS DEVICES:  [x] Peripheral IV  [ ] Central Venous Line	[ ] R	[ ] L	[ ] IJ	[ ] Fem	[ ] SC	Placed:   [ ] Arterial Line		[ ] R	[ ] L	[ ] Fem	[ ] Rad	[ ] Ax	Placed:   [ ] PICC:					[ ] Mediport  [ ] Urinary Catheter, Date Placed:   [ ] Necessity of urinary, arterial, and venous catheters discussed    OTHER MEDICATIONS:  chlorhexidine 2% Cloths 1 Application(s) Topical daily  lidocaine   Patch 1 Patch Transdermal daily      CODE STATUS: Presumed Full    IMAGING: No new imaging.

## 2020-10-08 NOTE — PROGRESS NOTE ADULT - ATTENDING COMMENTS
Orthopaedic Trauma Surgeon Addendum:    I have personally performed a face-to-face diagnostic evaluation on this patient.  I have reviewed the physician assistant note and agree with the history, exam, and plan of care, except as noted.    Orthopedic Surgery is ready to proceed with surgery pending medical optimization and adequate Operating Room availability. Risks of surgical delay discussed with other providers and staff. Please call with any questions or concerns.     Lincoln Mojica MD  Orthopaedic Trauma Surgeon  [facility] [place]
Orthopaedic Trauma Surgeon Addendum:    I have personally performed a face-to-face diagnostic evaluation on this patient.  I have reviewed the physician assistant note and agree with the history, exam, and plan of care, except as noted.    Orthopedic Surgery is ready to proceed with surgery pending medical optimization and adequate Operating Room availability. Risks of surgical delay discussed with other providers and staff. Please call with any questions or concerns. Balloon valvuloplasty today. Will follow up with primary team.     Lincoln Mojica MD  Orthopaedic Trauma Surgeon  [facility] [place]
I HAVE SEEN AND EXAMINED THE PATIENT WITH THE RESIDENT PHYSICIANS, PHYSICIAN ASSISTANTS AND NURSE PRACTITIONERS. I AGREE WITH THE NOTE, ASSESSMENT, PLAN AND PE. ZULLY
The patient was seen and examined  Events noted  No new problems  R LE N/V intact  Likely delirium - will treat non-pharmacologically; can likely stop 1:1  Awaiting valvuloplasty, then will need ORIF of hip  DVT prophylaxis
I have seen and examined the patient  no overnight issues    Neuro: awake, consumed at baseline  Pulm: clear bilaterally  CV: perfusion is adequate, HD normal  GI: abd soft  : urine flow adequate, electrolytes ok  ID: caryl op abx    PLan:  no post op issues at this time  ok to transfer out of ICU

## 2020-10-08 NOTE — DISCHARGE NOTE PROVIDER - CARE PROVIDERS DIRECT ADDRESSES
,[email or web address]

## 2020-10-08 NOTE — PROGRESS NOTE ADULT - SUBJECTIVE AND OBJECTIVE BOX
[place] [facility] - [facility], THE HEART CENTER                                   [address] [place] [address]                                                      PHONE: [phone]                                                         FAX: [phone]  [email or web address]  ---------------------------------------------------------------------------------------------------------------------------------    Overnight events/patient complaints:  Patient has no complaints overnight.   Surgery was performed yesterday and went well.      PAST MEDICAL & SURGICAL HISTORY:  Falls frequently    Parkinsonism, unspecified Parkinsonism type    Depression, unspecified depression type    Alzheimer&#x27;s dementia without behavioral disturbance, unspecified timing of dementia onset    Cholesterolosis    No significant past surgical history        No Known Allergies    MEDICATIONS  (STANDING):  amLODIPine   Tablet 5 milliGRAM(s) Oral daily  atorvastatin 20 milliGRAM(s) Oral at bedtime  carbidopa/levodopa  25/100 1 Tablet(s) Oral three times a day  chlorhexidine 2% Cloths 1 Application(s) Topical daily  cholecalciferol 2000 Unit(s) Oral every 48 hours  donepezil 5 milliGRAM(s) Oral daily  donepezil 5 milliGRAM(s) Oral at bedtime  enoxaparin Injectable 40 milliGRAM(s) SubCutaneous daily  lidocaine   Patch 1 Patch Transdermal daily  lidocaine   Patch 1 Patch Transdermal daily  losartan 50 milliGRAM(s) Oral daily  melatonin 3 milliGRAM(s) Oral at bedtime  memantine 10 milliGRAM(s) Oral two times a day  multiple electrolytes Injection Type 1 1000 milliLiter(s) (100 mL/Hr) IV Continuous <Continuous>  potassium chloride   Powder 20 milliEquivalent(s) Oral once  sertraline 100 milliGRAM(s) Oral daily    MEDICATIONS  (PRN):  acetaminophen   Tablet .. 650 milliGRAM(s) Oral every 6 hours PRN Mild Pain (1 - 3)      Vital Signs Last 24 Hrs  T(C): 36.9 (08 Oct 2020 08:08), Max: 36.9 (08 Oct 2020 08:00)  T(F): 98.5 (08 Oct 2020 08:08), Max: 98.5 (08 Oct 2020 08:00)  HR: 75 (08 Oct 2020 09:00) (68 - 102)  BP: 116/55 (08 Oct 2020 09:00) (108/54 - 172/66)  BP(mean): 79 (08 Oct 2020 09:00) (78 - 101)  RR: 5 (08 Oct 2020 09:00) (5 - 32)  SpO2: 97% (08 Oct 2020 09:00) (96% - 100%)  ICU Vital Signs Last 24 Hrs  GENO VERA  I&O's Detail    07 Oct 2020 07:01  -  08 Oct 2020 07:00  --------------------------------------------------------  IN:    IV PiggyBack: 100 mL    Oral Fluid: 200 mL  Total IN: 300 mL    OUT:    Voided (mL): 1000 mL  Total OUT: 1000 mL    Total NET: -700 mL      08 Oct 2020 07:01  -  08 Oct 2020 10:13  --------------------------------------------------------  IN:    IV PiggyBack: 100 mL    multiple electrolytes Injection Type 1.: 100 mL  Total IN: 200 mL    OUT:  Total OUT: 0 mL    Total NET: 200 mL        I&O's Summary    07 Oct 2020 07:01  -  08 Oct 2020 07:00  --------------------------------------------------------  IN: 300 mL / OUT: 1000 mL / NET: -700 mL    08 Oct 2020 07:01  -  08 Oct 2020 10:13  --------------------------------------------------------  IN: 200 mL / OUT: 0 mL / NET: 200 mL      Drug Dosing Weight  GENO VERA      PHYSICAL EXAM:  General: Appears well developed, well nourished alert and cooperative.  HEENT: Head; normocephalic, atraumatic.  Eyes: Pupils reactive, cornea wnl.  Neck: Supple, no nodes adenopathy, no NVD or carotid bruit or thyromegaly.  CARDIOVASCULAR: Normal S1 and S2, 3/6 crescendo/decrescendo murmur at RUSB  LUNGS: No rales, rhonchi or wheeze. Normal breath sounds bilaterally.  ABDOMEN: Soft, nontender without mass or organomegaly. bowel sounds normoactive.  EXTREMITIES: No clubbing, cyanosis or edema. Distal pulses wnl.   SKIN: warm and dry with normal turgor.  NEURO: Alert/oriented x 3/normal motor exam. No pathologic reflexes.    PSYCH: normal affect.        LABS:                        9.4    11.14 )-----------( 163      ( 08 Oct 2020 04:56 )             27.2     10-08    138  |  104  |  22.0<H>  ----------------------------<  116<H>  3.8   |  22.0  |  1.02    Ca    9.0      08 Oct 2020 04:56  Phos  3.2     10-08  Mg     2.0     10-08      GENO VERA      PT/INR - ( 07 Oct 2020 07:30 )   PT: 12.8 sec;   INR: 1.11 ratio         PTT - ( 07 Oct 2020 07:30 )  PTT:29.8 sec      RADIOLOGY & ADDITIONAL STUDIES:    INTERPRETATION OF TELEMETRY (personally reviewed): no events    ECG:    ECHO:  Summary:   1. Normal global left ventricular systolic function.   2. Moderate aortic valve stenosis.   3. Moderate aortic regurgitation.   4. There is no evidence of pericardial effusion.    MD Kip, RPVI Electronically signed on 10/6/2020 at 3:00:50 PM        *** Final ***                FRAKNLIN THOMAS M.D., ATTENDING CARDIOLOGY  This document has been electronically signed. Oct  6 2020  9:59AM    STRESS TEST:    CARDIAC CATHETERIZATION:    ASSESSMENT AND PLAN:  In summary, GENO VERA is an 85y Female with past medical history significant for dementia, severe AS, nonobstructive CAD, PVD presents with fall found to have hip fx.    Aortic stenosis  Patient had BAV which was successful.  Gradient down from 18 to 11.  Moderate AS and AI on echocardiogram.  Surgery performed on 10/7 which went well.  She is currently in the ICU and hemodynamically stable     HLD  Continue atorvastatin 20mg qhs    HTN  Continue losartan and amlodipine    Thank you for allowing [facility] to participate in the care of this patient.  Please feel free to call with any questions or concerns.

## 2020-10-08 NOTE — PROGRESS NOTE ADULT - ASSESSMENT
84yo F with alzheimer's dementia, severe AS s/p BAV, and nonobstructive CAD who was admitted after mechanical fall for a right femoral neck fracture. Prior to yesterday's hemiarthroplasty of right hip, patient had ballloon aortic valvuloplasty.     30 minutes of critical care time spent providing medical care for patient's acute illness/conditions that impairs at least one vital organ system and/or poses a high risk of imminent or life threatening deterioration in the patient's condition. It includes time spent evaluating and treating the patient's acute illness as well as time spent reviewing labs, radiology, discussing goals of care with patient and/or patient's family, and discussing the case with a multidisciplinary team in an effort to prevent further life threatening deterioration or end organ damage. This time is independent of any procedures performed. 86yo F with alzheimer's dementia, severe AS s/p BAV, and nonobstructive CAD who was admitted after mechanical fall for a right femoral neck fracture. Prior to yesterday's hemiarthroplasty of right hip 10/07/20, patient had balloon aortic valvuloplasty for severe aortic stenosis which required monitoring with MICU. Cardiology recommended that patient return to ICU for cardiac monitoring post op form her orthopedic procedure, and so patient presented to SICU post-operatively.    Neuro: Baseline Parkinson's dementia. Pain control with delirium precautions.  Sleep / Wake hygiene     Pulm: Room air. Nasal Cannula PRN    CV: NSR, BP controlled    GI/FEN: DASH diet. Plt @ 100    : Cr elevated today, FRANCOISE. Will hydrate. Voiding via primafit    ID: Patient with UTI, had received Rocephin and caryl-operative Ancef Will discuss whether further treatment indicated.     Endo: No active issues.       Dispo: May be downgraded to floor level of care         30 minutes of critical care time spent providing medical care for patient's acute illness/conditions that impairs at least one vital organ system and/or poses a high risk of imminent or life threatening deterioration in the patient's condition. It includes time spent evaluating and treating the patient's acute illness as well as time spent reviewing labs, radiology, discussing goals of care with patient and/or patient's family, and discussing the case with a multidisciplinary team in an effort to prevent further life threatening deterioration or end organ damage. This time is independent of any procedures performed.

## 2020-10-08 NOTE — PHYSICAL THERAPY INITIAL EVALUATION ADULT - DISCHARGE DISPOSITION, PT EVAL
Home with 24/7 assist if family willing and able to provide level of assist and home PT, vs GLENN GLENN, pt not safe to return home at this time.

## 2020-10-08 NOTE — DISCHARGE NOTE PROVIDER - NSDCFUADDINST_GEN_ALL_CORE_FT
The patient will be seen in the office between 2-3 weeks for wound check. Patient may shower after post-op day #3 (10/10/20). The dressing is to be removed on day postop day #7 (10/14/20). The patient will contact the office if the wound becomes red, has increasing pain, develops bleeding or discharge, an injury occurs, or has other concerns. The patient will continue PT consistent with posterior total hip replacement protocol. The patient will continue to practice posterior total hip precautions for a minimum of 6 week. The patient will continue LOVENOX for 2 weeks and then begin ASPIRIN for DVTP. The patient is FULL weight bearing.              8.7    x     )-----------( x        ( 09 Oct 2020 11:45 )             26.5   10-09    139  |  106  |  23.0<H>  ----------------------------<  100<H>  3.9   |  25.0  |  0.75    Ca    8.6      09 Oct 2020 06:12  Phos  2.3     10-09  Mg     2.1     10-09

## 2020-10-09 ENCOUNTER — TRANSCRIPTION ENCOUNTER (OUTPATIENT)
Age: 85
End: 2020-10-09

## 2020-10-09 VITALS
HEART RATE: 94 BPM | TEMPERATURE: 98 F | RESPIRATION RATE: 18 BRPM | OXYGEN SATURATION: 93 % | SYSTOLIC BLOOD PRESSURE: 90 MMHG | DIASTOLIC BLOOD PRESSURE: 53 MMHG

## 2020-10-09 LAB
ANION GAP SERPL CALC-SCNC: 8 MMOL/L — SIGNIFICANT CHANGE UP (ref 5–17)
BASOPHILS # BLD AUTO: 0.04 K/UL — SIGNIFICANT CHANGE UP (ref 0–0.2)
BASOPHILS NFR BLD AUTO: 0.5 % — SIGNIFICANT CHANGE UP (ref 0–2)
BLD GP AB SCN SERPL QL: SIGNIFICANT CHANGE UP
BUN SERPL-MCNC: 23 MG/DL — HIGH (ref 8–20)
CALCIUM SERPL-MCNC: 8.6 MG/DL — SIGNIFICANT CHANGE UP (ref 8.6–10.2)
CHLORIDE SERPL-SCNC: 106 MMOL/L — SIGNIFICANT CHANGE UP (ref 98–107)
CO2 SERPL-SCNC: 25 MMOL/L — SIGNIFICANT CHANGE UP (ref 22–29)
CREAT SERPL-MCNC: 0.75 MG/DL — SIGNIFICANT CHANGE UP (ref 0.5–1.3)
EOSINOPHIL # BLD AUTO: 0.03 K/UL — SIGNIFICANT CHANGE UP (ref 0–0.5)
EOSINOPHIL NFR BLD AUTO: 0.4 % — SIGNIFICANT CHANGE UP (ref 0–6)
GLUCOSE SERPL-MCNC: 100 MG/DL — HIGH (ref 70–99)
HCT VFR BLD CALC: 24.8 % — LOW (ref 34.5–45)
HCT VFR BLD CALC: 26.5 % — LOW (ref 34.5–45)
HGB BLD-MCNC: 8.2 G/DL — LOW (ref 11.5–15.5)
HGB BLD-MCNC: 8.7 G/DL — LOW (ref 11.5–15.5)
IMM GRANULOCYTES NFR BLD AUTO: 0.6 % — SIGNIFICANT CHANGE UP (ref 0–1.5)
LYMPHOCYTES # BLD AUTO: 1.36 K/UL — SIGNIFICANT CHANGE UP (ref 1–3.3)
LYMPHOCYTES # BLD AUTO: 16.1 % — SIGNIFICANT CHANGE UP (ref 13–44)
MAGNESIUM SERPL-MCNC: 2.1 MG/DL — SIGNIFICANT CHANGE UP (ref 1.6–2.6)
MCHC RBC-ENTMCNC: 29.8 PG — SIGNIFICANT CHANGE UP (ref 27–34)
MCHC RBC-ENTMCNC: 33.1 GM/DL — SIGNIFICANT CHANGE UP (ref 32–36)
MCV RBC AUTO: 90.2 FL — SIGNIFICANT CHANGE UP (ref 80–100)
MONOCYTES # BLD AUTO: 1.22 K/UL — HIGH (ref 0–0.9)
MONOCYTES NFR BLD AUTO: 14.4 % — HIGH (ref 2–14)
NEUTROPHILS # BLD AUTO: 5.76 K/UL — SIGNIFICANT CHANGE UP (ref 1.8–7.4)
NEUTROPHILS NFR BLD AUTO: 68 % — SIGNIFICANT CHANGE UP (ref 43–77)
PHOSPHATE SERPL-MCNC: 2.3 MG/DL — LOW (ref 2.4–4.7)
PLATELET # BLD AUTO: 132 K/UL — LOW (ref 150–400)
POTASSIUM SERPL-MCNC: 3.9 MMOL/L — SIGNIFICANT CHANGE UP (ref 3.5–5.3)
POTASSIUM SERPL-SCNC: 3.9 MMOL/L — SIGNIFICANT CHANGE UP (ref 3.5–5.3)
RBC # BLD: 2.75 M/UL — LOW (ref 3.8–5.2)
RBC # FLD: 12.9 % — SIGNIFICANT CHANGE UP (ref 10.3–14.5)
SODIUM SERPL-SCNC: 139 MMOL/L — SIGNIFICANT CHANGE UP (ref 135–145)
WBC # BLD: 8.46 K/UL — SIGNIFICANT CHANGE UP (ref 3.8–10.5)
WBC # FLD AUTO: 8.46 K/UL — SIGNIFICANT CHANGE UP (ref 3.8–10.5)

## 2020-10-09 PROCEDURE — 93005 ELECTROCARDIOGRAM TRACING: CPT

## 2020-10-09 PROCEDURE — 87186 SC STD MICRODIL/AGAR DIL: CPT

## 2020-10-09 PROCEDURE — 97530 THERAPEUTIC ACTIVITIES: CPT

## 2020-10-09 PROCEDURE — C1887: CPT

## 2020-10-09 PROCEDURE — 85025 COMPLETE CBC W/AUTO DIFF WBC: CPT

## 2020-10-09 PROCEDURE — 99153 MOD SED SAME PHYS/QHP EA: CPT

## 2020-10-09 PROCEDURE — C1769: CPT

## 2020-10-09 PROCEDURE — 93452 LEFT HRT CATH W/VENTRCLGRPHY: CPT

## 2020-10-09 PROCEDURE — 80048 BASIC METABOLIC PNL TOTAL CA: CPT

## 2020-10-09 PROCEDURE — C1760: CPT

## 2020-10-09 PROCEDURE — 82962 GLUCOSE BLOOD TEST: CPT

## 2020-10-09 PROCEDURE — 99233 SBSQ HOSP IP/OBS HIGH 50: CPT

## 2020-10-09 PROCEDURE — 86900 BLOOD TYPING SEROLOGIC ABO: CPT

## 2020-10-09 PROCEDURE — C1776: CPT

## 2020-10-09 PROCEDURE — 76377 3D RENDER W/INTRP POSTPROCES: CPT

## 2020-10-09 PROCEDURE — 85014 HEMATOCRIT: CPT

## 2020-10-09 PROCEDURE — 97163 PT EVAL HIGH COMPLEX 45 MIN: CPT

## 2020-10-09 PROCEDURE — 84100 ASSAY OF PHOSPHORUS: CPT

## 2020-10-09 PROCEDURE — C1889: CPT

## 2020-10-09 PROCEDURE — 86923 COMPATIBILITY TEST ELECTRIC: CPT

## 2020-10-09 PROCEDURE — C1894: CPT

## 2020-10-09 PROCEDURE — 72192 CT PELVIS W/O DYE: CPT

## 2020-10-09 PROCEDURE — 86850 RBC ANTIBODY SCREEN: CPT

## 2020-10-09 PROCEDURE — 73501 X-RAY EXAM HIP UNI 1 VIEW: CPT

## 2020-10-09 PROCEDURE — 36415 COLL VENOUS BLD VENIPUNCTURE: CPT

## 2020-10-09 PROCEDURE — 87086 URINE CULTURE/COLONY COUNT: CPT

## 2020-10-09 PROCEDURE — C1726: CPT

## 2020-10-09 PROCEDURE — 73552 X-RAY EXAM OF FEMUR 2/>: CPT

## 2020-10-09 PROCEDURE — 83735 ASSAY OF MAGNESIUM: CPT

## 2020-10-09 PROCEDURE — 73502 X-RAY EXAM HIP UNI 2-3 VIEWS: CPT

## 2020-10-09 PROCEDURE — 81001 URINALYSIS AUTO W/SCOPE: CPT

## 2020-10-09 PROCEDURE — 99152 MOD SED SAME PHYS/QHP 5/>YRS: CPT

## 2020-10-09 PROCEDURE — 85730 THROMBOPLASTIN TIME PARTIAL: CPT

## 2020-10-09 PROCEDURE — 93308 TTE F-UP OR LMTD: CPT

## 2020-10-09 PROCEDURE — 85018 HEMOGLOBIN: CPT

## 2020-10-09 PROCEDURE — 99238 HOSP IP/OBS DSCHRG MGMT 30/<: CPT

## 2020-10-09 PROCEDURE — 80053 COMPREHEN METABOLIC PANEL: CPT

## 2020-10-09 PROCEDURE — U0003: CPT

## 2020-10-09 PROCEDURE — 86769 SARS-COV-2 COVID-19 ANTIBODY: CPT

## 2020-10-09 PROCEDURE — 93306 TTE W/DOPPLER COMPLETE: CPT

## 2020-10-09 PROCEDURE — 85027 COMPLETE CBC AUTOMATED: CPT

## 2020-10-09 PROCEDURE — 85610 PROTHROMBIN TIME: CPT

## 2020-10-09 PROCEDURE — 99285 EMERGENCY DEPT VISIT HI MDM: CPT

## 2020-10-09 PROCEDURE — 97167 OT EVAL HIGH COMPLEX 60 MIN: CPT

## 2020-10-09 PROCEDURE — 92986 REVISION OF AORTIC VALVE: CPT

## 2020-10-09 PROCEDURE — 86901 BLOOD TYPING SEROLOGIC RH(D): CPT

## 2020-10-09 PROCEDURE — 71045 X-RAY EXAM CHEST 1 VIEW: CPT

## 2020-10-09 RX ORDER — POTASSIUM PHOSPHATE, MONOBASIC POTASSIUM PHOSPHATE, DIBASIC 236; 224 MG/ML; MG/ML
15 INJECTION, SOLUTION INTRAVENOUS ONCE
Refills: 0 | Status: COMPLETED | OUTPATIENT
Start: 2020-10-09 | End: 2020-10-09

## 2020-10-09 RX ORDER — MEMANTINE HYDROCHLORIDE 10 MG/1
1 TABLET ORAL
Qty: 0 | Refills: 0 | DISCHARGE

## 2020-10-09 RX ORDER — ACETAMINOPHEN 500 MG
3 TABLET ORAL
Qty: 0 | Refills: 0 | DISCHARGE
Start: 2020-10-09

## 2020-10-09 RX ORDER — TRAMADOL HYDROCHLORIDE 50 MG/1
0.5 TABLET ORAL
Qty: 0 | Refills: 0 | DISCHARGE
Start: 2020-10-09

## 2020-10-09 RX ORDER — LANOLIN ALCOHOL/MO/W.PET/CERES
1 CREAM (GRAM) TOPICAL
Qty: 0 | Refills: 0 | DISCHARGE
Start: 2020-10-09

## 2020-10-09 RX ORDER — MEMANTINE HYDROCHLORIDE 10 MG/1
1 TABLET ORAL
Qty: 0 | Refills: 0 | DISCHARGE
Start: 2020-10-09

## 2020-10-09 RX ORDER — LACTOBACILLUS ACIDOPHILUS 100MM CELL
1 CAPSULE ORAL
Qty: 0 | Refills: 0 | DISCHARGE
Start: 2020-10-09

## 2020-10-09 RX ORDER — TRAMADOL HYDROCHLORIDE 50 MG/1
25 TABLET ORAL EVERY 8 HOURS
Refills: 0 | Status: DISCONTINUED | OUTPATIENT
Start: 2020-10-09 | End: 2020-10-09

## 2020-10-09 RX ORDER — ACETAMINOPHEN 500 MG
2 TABLET ORAL
Qty: 0 | Refills: 0 | DISCHARGE
Start: 2020-10-09

## 2020-10-09 RX ORDER — LOSARTAN POTASSIUM 100 MG/1
1 TABLET, FILM COATED ORAL
Qty: 0 | Refills: 0 | DISCHARGE

## 2020-10-09 RX ORDER — AMLODIPINE BESYLATE 2.5 MG/1
1 TABLET ORAL
Qty: 0 | Refills: 0 | DISCHARGE

## 2020-10-09 RX ORDER — ENOXAPARIN SODIUM 100 MG/ML
40 INJECTION SUBCUTANEOUS
Qty: 0 | Refills: 0 | DISCHARGE
Start: 2020-10-09

## 2020-10-09 RX ORDER — ASPIRIN/CALCIUM CARB/MAGNESIUM 324 MG
1 TABLET ORAL
Qty: 0 | Refills: 0 | DISCHARGE

## 2020-10-09 RX ORDER — SODIUM,POTASSIUM PHOSPHATES 278-250MG
1 POWDER IN PACKET (EA) ORAL ONCE
Refills: 0 | Status: DISCONTINUED | OUTPATIENT
Start: 2020-10-09 | End: 2020-10-09

## 2020-10-09 RX ORDER — LIDOCAINE 4 G/100G
1 CREAM TOPICAL
Qty: 0 | Refills: 0 | DISCHARGE
Start: 2020-10-09

## 2020-10-09 RX ADMIN — TRAMADOL HYDROCHLORIDE 25 MILLIGRAM(S): 50 TABLET ORAL at 14:00

## 2020-10-09 RX ADMIN — Medication 975 MILLIGRAM(S): at 13:00

## 2020-10-09 RX ADMIN — Medication 15 MILLIGRAM(S): at 08:45

## 2020-10-09 RX ADMIN — DONEPEZIL HYDROCHLORIDE 5 MILLIGRAM(S): 10 TABLET, FILM COATED ORAL at 12:59

## 2020-10-09 RX ADMIN — ENOXAPARIN SODIUM 40 MILLIGRAM(S): 100 INJECTION SUBCUTANEOUS at 13:00

## 2020-10-09 RX ADMIN — POTASSIUM PHOSPHATE, MONOBASIC POTASSIUM PHOSPHATE, DIBASIC 62.5 MILLIMOLE(S): 236; 224 INJECTION, SOLUTION INTRAVENOUS at 13:02

## 2020-10-09 RX ADMIN — MEMANTINE HYDROCHLORIDE 10 MILLIGRAM(S): 10 TABLET ORAL at 06:51

## 2020-10-09 RX ADMIN — LIDOCAINE 1 PATCH: 4 CREAM TOPICAL at 13:01

## 2020-10-09 RX ADMIN — Medication 975 MILLIGRAM(S): at 00:45

## 2020-10-09 RX ADMIN — Medication 975 MILLIGRAM(S): at 13:02

## 2020-10-09 RX ADMIN — Medication 975 MILLIGRAM(S): at 06:51

## 2020-10-09 RX ADMIN — Medication 975 MILLIGRAM(S): at 07:30

## 2020-10-09 RX ADMIN — CARBIDOPA AND LEVODOPA 1 TABLET(S): 25; 100 TABLET ORAL at 06:51

## 2020-10-09 RX ADMIN — CARBIDOPA AND LEVODOPA 1 TABLET(S): 25; 100 TABLET ORAL at 13:00

## 2020-10-09 RX ADMIN — TRAMADOL HYDROCHLORIDE 25 MILLIGRAM(S): 50 TABLET ORAL at 13:35

## 2020-10-09 RX ADMIN — SERTRALINE 100 MILLIGRAM(S): 25 TABLET, FILM COATED ORAL at 12:59

## 2020-10-09 RX ADMIN — Medication 15 MILLIGRAM(S): at 08:36

## 2020-10-09 RX ADMIN — Medication 1 TABLET(S): at 06:50

## 2020-10-09 NOTE — PROGRESS NOTE ADULT - REASON FOR ADMISSION
blunt trauma, fall from standing, r femoral neck fx
Anesthesia evaluation requested by orthopedics
blunt trauma, fall from standing, r femoral neck fx

## 2020-10-09 NOTE — PROGRESS NOTE ADULT - SUBJECTIVE AND OBJECTIVE BOX
s/p right hip hemiarhtroplasty pod #2.  Pt c/o pain to right LE with movement     Vital Signs Last 24 Hrs  T(C): 37.1 (09 Oct 2020 07:26), Max: 37.5 (08 Oct 2020 18:35)  T(F): 98.8 (09 Oct 2020 07:26), Max: 99.5 (08 Oct 2020 18:35)  HR: 91 (09 Oct 2020 07:26) (74 - 99)  BP: 94/49 (09 Oct 2020 07:26) (85/67 - 130/73)  BP(mean): 84 (08 Oct 2020 18:00) (73 - 84)  RR: 18 (09 Oct 2020 07:26) (16 - 28)  SpO2: 95% (09 Oct 2020 07:26) (90% - 99%)    Pt lying in bed NAD  confused at baseline  right hip dressing c/d/i  calf soft NT  toe movement intact  sensation intact  pulses palp                          8.2    8.46  )-----------( 132      ( 09 Oct 2020 06:12 )             24.8   10-09    139  |  106  |  23.0<H>  ----------------------------<  100<H>  3.9   |  25.0  |  0.75    Ca    8.6      09 Oct 2020 06:12  Phos  2.3     10-09  Mg     2.1     10-09    A/P:  s/p right hip hemiarthroplasty pod #2  WBAT  DVTP  pain control  Ortho stable

## 2020-10-09 NOTE — DISCHARGE NOTE NURSING/CASE MANAGEMENT/SOCIAL WORK - NSDCFUADDAPPT_GEN_ALL_CORE_FT
See Dr. Ho in office upon d/c from [facility]   See Dr. Mojica in office in 2 weeks     The patient will be seen in the office between 2-3 weeks for wound check. Patient may shower after post-op day #3 (10/10/20). The dressing is to be removed on day postop day #7 (10/14/20). The patient will contact the office if the wound becomes red, has increasing pain, develops bleeding or discharge, an injury occurs, or has other concerns. The patient will continue PT consistent with posterior total hip replacement protocol. The patient will continue to practice posterior total hip precautions for a minimum of 6 week. The patient will continue LOVENOX for 2 weeks and then begin ASPIRIN for DVTP. The patient is FULL weight bearing.

## 2020-10-09 NOTE — PROGRESS NOTE ADULT - ASSESSMENT
85F s/p R hip hemiarthoplasty 10/7, balloon valvulopasty for AS 10/5, ready for discharge from trauma surgery standpoing  - for GLENN  -

## 2020-10-09 NOTE — PROGRESS NOTE ADULT - SUBJECTIVE AND OBJECTIVE BOX
[place] [facility] - [facility], THE HEART CENTER                                   [address] [place] [address]                                                      PHONE: [phone]                                                         FAX: [phone]  [email or web address]  ---------------------------------------------------------------------------------------------------------------------------------    Overnight events/patient complaints:  PT feels well without complaints    No Known Allergies    MEDICATIONS  (STANDING):  acetaminophen   Tablet .. 975 milliGRAM(s) Oral every 8 hours  amLODIPine   Tablet 5 milliGRAM(s) Oral daily  atorvastatin 20 milliGRAM(s) Oral at bedtime  carbidopa/levodopa  25/100 1 Tablet(s) Oral three times a day  cefTRIAXone   IVPB      cefTRIAXone   IVPB 1000 milliGRAM(s) IV Intermittent every 24 hours  chlorhexidine 2% Cloths 1 Application(s) Topical daily  cholecalciferol 2000 Unit(s) Oral every 48 hours  donepezil 5 milliGRAM(s) Oral daily  donepezil 5 milliGRAM(s) Oral at bedtime  enoxaparin Injectable 40 milliGRAM(s) SubCutaneous daily  lactobacillus acidophilus 1 Tablet(s) Oral two times a day  lidocaine   Patch 1 Patch Transdermal daily  lidocaine   Patch 1 Patch Transdermal daily  melatonin 3 milliGRAM(s) Oral at bedtime  memantine 10 milliGRAM(s) Oral two times a day  sertraline 100 milliGRAM(s) Oral daily    MEDICATIONS  (PRN):  acetaminophen   Tablet .. 650 milliGRAM(s) Oral every 6 hours PRN Temp greater or equal to 38C (100.4F), Mild Pain (1 - 3)  ketorolac   Injectable 15 milliGRAM(s) IV Push every 8 hours PRN Moderate Pain (4 - 6)  oxyCODONE    IR 2.5 milliGRAM(s) Oral every 6 hours PRN Severe Pain (7 - 10)      Vital Signs Last 24 Hrs  T(C): 37.1 (09 Oct 2020 07:26), Max: 37.5 (08 Oct 2020 18:35)  T(F): 98.8 (09 Oct 2020 07:26), Max: 99.5 (08 Oct 2020 18:35)  HR: 91 (09 Oct 2020 07:26) (73 - 99)  BP: 94/49 (09 Oct 2020 07:26) (85/67 - 130/73)  BP(mean): 84 (08 Oct 2020 18:00) (65 - 84)  RR: 18 (09 Oct 2020 07:26) (16 - 28)  SpO2: 95% (09 Oct 2020 07:26) (90% - 99%)  ICU Vital Signs Last 24 Hrs  GENO VERA  I&O's Detail    08 Oct 2020 07:01  -  09 Oct 2020 07:00  --------------------------------------------------------  IN:    IV PiggyBack: 100 mL    IV PiggyBack: 50 mL    multiple electrolytes Injection Type 1.: 200 mL    multiple electrolytes Injection Type 1.: 300 mL  Total IN: 650 mL    OUT:    Voided (mL): 750 mL  Total OUT: 750 mL    Total NET: -100 mL        Drug Dosing Weight  GENO CHUY      PHYSICAL EXAM:  General: Appears well developed, well nourished alert and cooperative.  HEENT: Head; normocephalic, atraumatic.  Eyes: Pupils reactive, cornea wnl.  Neck: Supple, no nodes adenopathy, no NVD or carotid bruit or thyromegaly.  CARDIOVASCULAR:2/6 systolic murmur   LUNGS: No rales, rhonchi or wheeze. Normal breath sounds bilaterally.  ABDOMEN: Soft, nontender without mass or organomegaly. bowel sounds normoactive.  EXTREMITIES: No clubbing, cyanosis or edema. Distal pulses wnl.   SKIN: warm and dry with normal turgor.  NEURO: Alert   PSYCH: normal affect.        LABS:                        8.2    8.46  )-----------( 132      ( 09 Oct 2020 06:12 )             24.8     10-09    139  |  106  |  23.0<H>  ----------------------------<  100<H>  3.9   |  25.0  |  0.75    Ca    8.6      09 Oct 2020 06:12  Phos  2.3     10-09  Mg     2.1     10-09      GENO VERA            RADIOLOGY & ADDITIONAL STUDIES:    INTERPRETATION OF TELEMETRY (personally reviewed): no events    ECG: NS @ 81 nonspecific T wave flattening lateral leads    ECHO: < from: TTE Echo Complete w/o Contrast w/ Doppler (10.06.20 @ 09:59) >  Summary:   1. Normal global left ventricular systolic function.   2. Moderate aortic valve stenosis.   3. Moderate aortic regurgitation.   4. There is no evidence of pericardial effusion.    MD Kip, RPVI Electronically signed on 10/6/2020 at 3:00:50 PM    < end of copied text >           CARDIAC CATHETERIZATION: < from: Cardiac Cath Lab - Adult (10.05.20 @ 14:54) >  DIAGNOSTIC IMPRESSIONS: BAV performed using 21 mm balloon with rapid  ventricular pacing. The mean aortic gradient dropped from 18 mm Hg to 11  mm Hg.  DIAGNOSTIC RECOMMENDATIONS: echo  INTERVENTIONAL IMPRESSIONS: BAV performed using 21mm balloon with rapid  ventricular pacing. The mean aortic gradient dropped from 18 mm Hg to 11  mm Hg.  INTERVENTIONAL RECOMMENDATIONS: echo  Prepared and signed by  Juan Bhatt MD  Signed 10/05/2020 16:04:12    < end of copied text >      ASSESSMENT AND PLAN:  In summary, GENO VERA is an 85y Female with past medical history significant for Dementia, Severe AS sp BAV during this admission , nonobstructive CAD, PAD, aw hip fracture sp arthroplasty.     1) sp BAV aortic valve currently stable  2) cw statin , bp meds  3) DC planning

## 2020-10-09 NOTE — PROGRESS NOTE ADULT - ASSESSMENT
Patient is an 84 yo F with h/o Parkinsons & Alzheimer's who lives alone w/ 24/7 nursing aid fell at home, and subsequently unable to bare weight. Pt was admitted to trauma service for right hip fracture. Cardiology evaluated pt and for Severe AS. Pt is s/p aorta valvuloplasty on 10/5. Post procedure pt had wide complex  tachycardia and upgraded to MICU.  Pt stable and now downgraded to gen med for her right hip hemiarthroplasty which was done on 10/7 and then was sent to the SICU, now downgraded again to gen med sv on 10/8.     Right hip fracture - s/p Right hip hemiarthroplasty   POD #2  post op leucocytosis noted -resolved   -leucocytosis likely reactive   -PT eval noted- informed that eval should occur again, daughter felt that she would like to see her mother walking again   -pain control prn - d/cd toradol due to mildly elevated BUN and added tramadol as needed for mod pain, oxy low dose for severe and tylenol ATC as she doesn't c/o pain along with lidoderm patch   -early ambulation   -dvt ppx, ortho on board- cleared for d/c   +stool softeners    Normocytic anemia - likely aocd   with acute blood loss anemia likely due to post op   -monitor hgb post op   -type and screen in am with repeat h/h   -if Hgb under 8.0, transfuse due to recent cardiac intervention     Severe Aortic Stenosis s/p aortic balloon valvuloplasty  complicated by wide complex tachycardia   - stable thus far, bp control noted  -c/w statin  -d/c norvasc for soft BP which is likely 2/2 pain meds   -cleared by cardio for d/c     UTI - c/w rocephin (received one dose on 10/6 and was not resumed post op), added bacid - Day #3/7  -Ecoli on urine culture. Will do treatment for 7 days total given age, debility, recent surgery and immobility at this time (can change to oral on d/c as she is pan sensitive)     Htn/Hld - d/c norvasc and losartan due to soft bp   -c/w statin  -monitor bp     Parkinsons/Alzheimers dementia  with depression   -c/w sinemet, donepezil, memantine  -c/w zoloft   -monitor behavioral status     DVT ppx - sq lovenox     Dispo - ABBY advised by PT, informed daughter that we rec also. Daughter not very happy overall with a few different things (PT seeing when she wasn't there, PT assessment, Her primary not seeing patient in the hospital and being seen by hospitalist service). Will inform CM for abby planning

## 2020-10-09 NOTE — PROGRESS NOTE ADULT - SUBJECTIVE AND OBJECTIVE BOX
CC: right hip fracture    Patient seen and examined at bedside. No acute distress and no acute overnight events. Not a good historian. Seen sleeping at bedside, upon awakening, was pleasant with no complaints of discomfort or pain. Wanted to drink some water.     ROS: Says no to everything - not reliable     Tele: monitor off this am     Vital Signs Last 24 Hrs  T(C): 37.1 (09 Oct 2020 07:26), Max: 37.5 (08 Oct 2020 18:35)  T(F): 98.8 (09 Oct 2020 07:26), Max: 99.5 (08 Oct 2020 18:35)  HR: 91 (09 Oct 2020 07:26) (74 - 93)  BP: 94/49 (09 Oct 2020 07:26) (94/49 - 130/73)  BP(mean): 84 (08 Oct 2020 18:00) (77 - 84)  RR: 18 (09 Oct 2020 07:26) (16 - 28)  SpO2: 95% (09 Oct 2020 07:26) (90% - 99%)    Exam:   Gen: elderly woman, alert and oriented to self only   HEENT: eomi, mmm, Mild pallor   CVS: S1S2nl no m/r/g RRR  Lungs: fair b/l ae   GI: Soft, Nt. bs   Ext: Right thigh warm to touch, dressing intact, no large ecchymosis on anterolateral portion of thigh   Neuro: as above. Moves Left side and RUE with motor in b/l UE 4/5     Labs:            10-09    139  |  106  |  23.0<H>  ----------------------------<  100<H>  3.9   |  25.0  |  0.75    Ca    8.6      09 Oct 2020 06:12  Phos  2.3     10-09  Mg     2.1     10-09                        8.2    8.46  )-----------( 132      ( 09 Oct 2020 06:12 )             24.8     MEDICATIONS  (STANDING):  acetaminophen   Tablet .. 975 milliGRAM(s) Oral every 8 hours  atorvastatin 20 milliGRAM(s) Oral at bedtime  carbidopa/levodopa  25/100 1 Tablet(s) Oral three times a day  cefTRIAXone   IVPB      cefTRIAXone   IVPB 1000 milliGRAM(s) IV Intermittent every 24 hours  chlorhexidine 2% Cloths 1 Application(s) Topical daily  cholecalciferol 2000 Unit(s) Oral every 48 hours  donepezil 5 milliGRAM(s) Oral daily  donepezil 5 milliGRAM(s) Oral at bedtime  enoxaparin Injectable 40 milliGRAM(s) SubCutaneous daily  lactobacillus acidophilus 1 Tablet(s) Oral two times a day  lidocaine   Patch 1 Patch Transdermal daily  lidocaine   Patch 1 Patch Transdermal daily  melatonin 3 milliGRAM(s) Oral at bedtime  memantine 10 milliGRAM(s) Oral two times a day  potassium phosphate IVPB 15 milliMole(s) IV Intermittent once  sertraline 100 milliGRAM(s) Oral daily    MEDICATIONS  (PRN):  acetaminophen   Tablet .. 650 milliGRAM(s) Oral every 6 hours PRN Temp greater or equal to 38C (100.4F), Mild Pain (1 - 3)  oxyCODONE    IR 2.5 milliGRAM(s) Oral every 6 hours PRN Severe Pain (7 - 10)  traMADol 25 milliGRAM(s) Oral every 8 hours PRN Moderate Pain (4 - 6)     CC: right hip fracture    Patient seen and examined at bedside. No acute distress and no acute overnight events. Not a good historian. Seen sleeping at bedside, upon awakening, was pleasant with no complaints of discomfort or pain. Wanted to drink some water.     ROS: Says no to everything - not reliable     Tele: monitor off this am     Vital Signs Last 24 Hrs  T(C): 37.1 (09 Oct 2020 07:26), Max: 37.5 (08 Oct 2020 18:35)  T(F): 98.8 (09 Oct 2020 07:26), Max: 99.5 (08 Oct 2020 18:35)  HR: 91 (09 Oct 2020 07:26) (74 - 93)  BP: 94/49 (09 Oct 2020 07:26) (94/49 - 130/73)  BP(mean): 84 (08 Oct 2020 18:00) (77 - 84)  RR: 18 (09 Oct 2020 07:26) (16 - 28)  SpO2: 95% (09 Oct 2020 07:26) (90% - 99%)    Exam:   Gen: elderly woman, alert and oriented to self only   HEENT: eomi, mmm, Mild pallor   CVS: S1S2nl +3/6 SM RRR  Lungs: fair b/l ae   GI: Soft, Nt. bs   Ext: Right thigh warm to touch, dressing intact, no large ecchymosis on anterolateral portion of thigh   Neuro: as above. Moves Left side and RUE with motor in b/l UE 4/5     Labs:            10-09    139  |  106  |  23.0<H>  ----------------------------<  100<H>  3.9   |  25.0  |  0.75    Ca    8.6      09 Oct 2020 06:12  Phos  2.3     10-09  Mg     2.1     10-09                        8.2    8.46  )-----------( 132      ( 09 Oct 2020 06:12 )             24.8     MEDICATIONS  (STANDING):  acetaminophen   Tablet .. 975 milliGRAM(s) Oral every 8 hours  atorvastatin 20 milliGRAM(s) Oral at bedtime  carbidopa/levodopa  25/100 1 Tablet(s) Oral three times a day  cefTRIAXone   IVPB      cefTRIAXone   IVPB 1000 milliGRAM(s) IV Intermittent every 24 hours  chlorhexidine 2% Cloths 1 Application(s) Topical daily  cholecalciferol 2000 Unit(s) Oral every 48 hours  donepezil 5 milliGRAM(s) Oral daily  donepezil 5 milliGRAM(s) Oral at bedtime  enoxaparin Injectable 40 milliGRAM(s) SubCutaneous daily  lactobacillus acidophilus 1 Tablet(s) Oral two times a day  lidocaine   Patch 1 Patch Transdermal daily  lidocaine   Patch 1 Patch Transdermal daily  melatonin 3 milliGRAM(s) Oral at bedtime  memantine 10 milliGRAM(s) Oral two times a day  potassium phosphate IVPB 15 milliMole(s) IV Intermittent once  sertraline 100 milliGRAM(s) Oral daily    MEDICATIONS  (PRN):  acetaminophen   Tablet .. 650 milliGRAM(s) Oral every 6 hours PRN Temp greater or equal to 38C (100.4F), Mild Pain (1 - 3)  oxyCODONE    IR 2.5 milliGRAM(s) Oral every 6 hours PRN Severe Pain (7 - 10)  traMADol 25 milliGRAM(s) Oral every 8 hours PRN Moderate Pain (4 - 6)

## 2020-10-09 NOTE — DISCHARGE NOTE NURSING/CASE MANAGEMENT/SOCIAL WORK - PATIENT PORTAL LINK FT
You can access the FollowMyHealth Patient Portal offered by [facility] by registering at the following website: [email or web address]. By joining [email or web address]’s FollowMyHealth portal, you will also be able to view your health information using other applications (apps) compatible with our system.

## 2020-10-09 NOTE — PROGRESS NOTE ADULT - SUBJECTIVE AND OBJECTIVE BOX
INTERVAL HPI/OVERNIGHT EVENTS:  Patient was seen and examined at bedside this AM.  No acute events overnight. Off 1:1, hemodynamically normal, tolerating diet, Cr back to baselineline, PT/OT to GLENN      MEDICATIONS  (STANDING):  acetaminophen   Tablet .. 975 milliGRAM(s) Oral every 8 hours  amLODIPine   Tablet 5 milliGRAM(s) Oral daily  atorvastatin 20 milliGRAM(s) Oral at bedtime  carbidopa/levodopa  25/100 1 Tablet(s) Oral three times a day  cefTRIAXone   IVPB      cefTRIAXone   IVPB 1000 milliGRAM(s) IV Intermittent every 24 hours  chlorhexidine 2% Cloths 1 Application(s) Topical daily  cholecalciferol 2000 Unit(s) Oral every 48 hours  donepezil 5 milliGRAM(s) Oral daily  donepezil 5 milliGRAM(s) Oral at bedtime  enoxaparin Injectable 40 milliGRAM(s) SubCutaneous daily  lactobacillus acidophilus 1 Tablet(s) Oral two times a day  lidocaine   Patch 1 Patch Transdermal daily  lidocaine   Patch 1 Patch Transdermal daily  melatonin 3 milliGRAM(s) Oral at bedtime  memantine 10 milliGRAM(s) Oral two times a day  sertraline 100 milliGRAM(s) Oral daily    MEDICATIONS  (PRN):  acetaminophen   Tablet .. 650 milliGRAM(s) Oral every 6 hours PRN Temp greater or equal to 38C (100.4F), Mild Pain (1 - 3)  ketorolac   Injectable 15 milliGRAM(s) IV Push every 8 hours PRN Moderate Pain (4 - 6)  oxyCODONE    IR 2.5 milliGRAM(s) Oral every 6 hours PRN Severe Pain (7 - 10)      Vital Signs Last 24 Hrs  T(C): 37.1 (09 Oct 2020 07:26), Max: 37.5 (08 Oct 2020 18:35)  T(F): 98.8 (09 Oct 2020 07:26), Max: 99.5 (08 Oct 2020 18:35)  HR: 91 (09 Oct 2020 07:26) (73 - 99)  BP: 94/49 (09 Oct 2020 07:26) (85/67 - 130/73)  BP(mean): 84 (08 Oct 2020 18:00) (65 - 84)  RR: 18 (09 Oct 2020 07:26) (16 - 28)  SpO2: 95% (09 Oct 2020 07:26) (90% - 99%)    Physical Exam:  Gen: NAD  Neurological:  No sensory/motor deficits  HEENT: PERRLA, EOMI  Respiratory: Breath Sounds equal & CTA bilaterally, no accessory muscle use  Cardiovascular: Regular rate & rhythm, normal S1, S2; no murmurs, gallops or rubs  Gastrointestinal: Soft, non-tender, nondistended  Vascular: Equal and normal pulses: 2+ peripheral pulses throughout  Musculoskeletal: No joint pain, swelling or deformity; no limitation of movement  Skin: No rashes      I&O's Detail    08 Oct 2020 07:01  -  09 Oct 2020 07:00  --------------------------------------------------------  IN:    IV PiggyBack: 100 mL    IV PiggyBack: 50 mL    multiple electrolytes Injection Type 1.: 200 mL    multiple electrolytes Injection Type 1.: 300 mL  Total IN: 650 mL    OUT:    Voided (mL): 750 mL  Total OUT: 750 mL    Total NET: -100 mL          LABS:                        8.2    8.46  )-----------( 132      ( 09 Oct 2020 06:12 )             24.8     10-09    139  |  106  |  23.0<H>  ----------------------------<  100<H>  3.9   |  25.0  |  0.75    Ca    8.6      09 Oct 2020 06:12  Phos  2.3     10-09  Mg     2.1     10-09            RADIOLOGY & ADDITIONAL STUDIES:

## 2020-12-07 ENCOUNTER — RX RENEWAL (OUTPATIENT)
Age: 85
End: 2020-12-07

## 2020-12-31 ENCOUNTER — INPATIENT (INPATIENT)
Facility: HOSPITAL | Age: 85
LOS: 3 days | Discharge: HOME | DRG: 689 | End: 2021-01-04
Attending: INTERNAL MEDICINE | Admitting: HOSPITALIST
Payer: MEDICARE

## 2020-12-31 VITALS
DIASTOLIC BLOOD PRESSURE: 71 MMHG | OXYGEN SATURATION: 96 % | TEMPERATURE: 99 F | HEART RATE: 67 BPM | RESPIRATION RATE: 18 BRPM | SYSTOLIC BLOOD PRESSURE: 141 MMHG | HEIGHT: 66 IN

## 2020-12-31 DIAGNOSIS — N30.00 ACUTE CYSTITIS WITHOUT HEMATURIA: ICD-10-CM

## 2020-12-31 PROBLEM — G20 PARKINSON'S DISEASE: Chronic | Status: ACTIVE | Noted: 2020-10-02

## 2020-12-31 PROBLEM — R29.6 REPEATED FALLS: Chronic | Status: ACTIVE | Noted: 2020-10-02

## 2020-12-31 PROBLEM — K82.4 CHOLESTEROLOSIS OF GALLBLADDER: Chronic | Status: ACTIVE | Noted: 2020-10-02

## 2020-12-31 PROBLEM — F32.9 MAJOR DEPRESSIVE DISORDER, SINGLE EPISODE, UNSPECIFIED: Chronic | Status: ACTIVE | Noted: 2020-10-02

## 2020-12-31 PROBLEM — G30.9 ALZHEIMER'S DISEASE, UNSPECIFIED: Chronic | Status: ACTIVE | Noted: 2020-10-02

## 2020-12-31 LAB
ALBUMIN SERPL ELPH-MCNC: 3.8 G/DL — SIGNIFICANT CHANGE UP (ref 3.3–5.2)
ALP SERPL-CCNC: 115 U/L — SIGNIFICANT CHANGE UP (ref 40–120)
ALT FLD-CCNC: 12 U/L — SIGNIFICANT CHANGE UP
ANION GAP SERPL CALC-SCNC: 12 MMOL/L — SIGNIFICANT CHANGE UP (ref 5–17)
APPEARANCE UR: CLEAR — SIGNIFICANT CHANGE UP
APTT BLD: 50.7 SEC — HIGH (ref 27.5–35.5)
AST SERPL-CCNC: 18 U/L — SIGNIFICANT CHANGE UP
BACTERIA # UR AUTO: ABNORMAL
BASE EXCESS BLDV CALC-SCNC: 1.9 MMOL/L — SIGNIFICANT CHANGE UP (ref -2–2)
BASOPHILS # BLD AUTO: 0.04 K/UL — SIGNIFICANT CHANGE UP (ref 0–0.2)
BASOPHILS NFR BLD AUTO: 0.4 % — SIGNIFICANT CHANGE UP (ref 0–2)
BILIRUB SERPL-MCNC: 1.2 MG/DL — SIGNIFICANT CHANGE UP (ref 0.4–2)
BILIRUB UR-MCNC: NEGATIVE — SIGNIFICANT CHANGE UP
BUN SERPL-MCNC: 22 MG/DL — HIGH (ref 8–20)
CA-I SERPL-SCNC: 1.2 MMOL/L — SIGNIFICANT CHANGE UP (ref 1.15–1.33)
CALCIUM SERPL-MCNC: 9.7 MG/DL — SIGNIFICANT CHANGE UP (ref 8.6–10.2)
CHLORIDE BLDV-SCNC: 105 MMOL/L — SIGNIFICANT CHANGE UP (ref 98–107)
CHLORIDE SERPL-SCNC: 102 MMOL/L — SIGNIFICANT CHANGE UP (ref 98–107)
CO2 SERPL-SCNC: 23 MMOL/L — SIGNIFICANT CHANGE UP (ref 22–29)
COLOR SPEC: YELLOW — SIGNIFICANT CHANGE UP
CREAT SERPL-MCNC: 0.88 MG/DL — SIGNIFICANT CHANGE UP (ref 0.5–1.3)
DIFF PNL FLD: ABNORMAL
EOSINOPHIL # BLD AUTO: 0.15 K/UL — SIGNIFICANT CHANGE UP (ref 0–0.5)
EOSINOPHIL NFR BLD AUTO: 1.6 % — SIGNIFICANT CHANGE UP (ref 0–6)
EPI CELLS # UR: SIGNIFICANT CHANGE UP
GAS PNL BLDV: 143 MMOL/L — SIGNIFICANT CHANGE UP (ref 135–145)
GAS PNL BLDV: SIGNIFICANT CHANGE UP
GAS PNL BLDV: SIGNIFICANT CHANGE UP
GLUCOSE BLDV-MCNC: 120 MG/DL — HIGH (ref 70–99)
GLUCOSE SERPL-MCNC: 121 MG/DL — HIGH (ref 70–99)
GLUCOSE UR QL: NEGATIVE — SIGNIFICANT CHANGE UP
HCO3 BLDV-SCNC: 25 MMOL/L — SIGNIFICANT CHANGE UP (ref 21–29)
HCT VFR BLD CALC: 42.9 % — SIGNIFICANT CHANGE UP (ref 34.5–45)
HCT VFR BLDA CALC: 44 — SIGNIFICANT CHANGE UP (ref 39–50)
HGB BLD CALC-MCNC: 14.4 G/DL — SIGNIFICANT CHANGE UP (ref 11.5–15.5)
HGB BLD-MCNC: 14.1 G/DL — SIGNIFICANT CHANGE UP (ref 11.5–15.5)
IMM GRANULOCYTES NFR BLD AUTO: 0.2 % — SIGNIFICANT CHANGE UP (ref 0–1.5)
INR BLD: 1.07 RATIO — SIGNIFICANT CHANGE UP (ref 0.88–1.16)
KETONES UR-MCNC: ABNORMAL
LACTATE BLDV-MCNC: 1.6 MMOL/L — SIGNIFICANT CHANGE UP (ref 0.5–2)
LEUKOCYTE ESTERASE UR-ACNC: ABNORMAL
LYMPHOCYTES # BLD AUTO: 0.96 K/UL — LOW (ref 1–3.3)
LYMPHOCYTES # BLD AUTO: 10 % — LOW (ref 13–44)
MCHC RBC-ENTMCNC: 28.9 PG — SIGNIFICANT CHANGE UP (ref 27–34)
MCHC RBC-ENTMCNC: 32.9 GM/DL — SIGNIFICANT CHANGE UP (ref 32–36)
MCV RBC AUTO: 87.9 FL — SIGNIFICANT CHANGE UP (ref 80–100)
MONOCYTES # BLD AUTO: 0.84 K/UL — SIGNIFICANT CHANGE UP (ref 0–0.9)
MONOCYTES NFR BLD AUTO: 8.7 % — SIGNIFICANT CHANGE UP (ref 2–14)
NEUTROPHILS # BLD AUTO: 7.6 K/UL — HIGH (ref 1.8–7.4)
NEUTROPHILS NFR BLD AUTO: 79.1 % — HIGH (ref 43–77)
NITRITE UR-MCNC: POSITIVE
OTHER CELLS CSF MANUAL: 12 ML/DL — LOW (ref 18–22)
PCO2 BLDV: 50 MMHG — SIGNIFICANT CHANGE UP (ref 35–50)
PH BLDV: 7.36 — SIGNIFICANT CHANGE UP (ref 7.32–7.43)
PH UR: 6 — SIGNIFICANT CHANGE UP (ref 5–8)
PLATELET # BLD AUTO: 109 K/UL — LOW (ref 150–400)
PO2 BLDV: 33 MMHG — SIGNIFICANT CHANGE UP (ref 25–45)
POTASSIUM BLDV-SCNC: 4.2 MMOL/L — SIGNIFICANT CHANGE UP (ref 3.4–4.5)
POTASSIUM SERPL-MCNC: 4.1 MMOL/L — SIGNIFICANT CHANGE UP (ref 3.5–5.3)
POTASSIUM SERPL-SCNC: 4.1 MMOL/L — SIGNIFICANT CHANGE UP (ref 3.5–5.3)
PROT SERPL-MCNC: 7 G/DL — SIGNIFICANT CHANGE UP (ref 6.6–8.7)
PROT UR-MCNC: 100
PROTHROM AB SERPL-ACNC: 12.4 SEC — SIGNIFICANT CHANGE UP (ref 10.6–13.6)
RBC # BLD: 4.88 M/UL — SIGNIFICANT CHANGE UP (ref 3.8–5.2)
RBC # FLD: 12.6 % — SIGNIFICANT CHANGE UP (ref 10.3–14.5)
RBC CASTS # UR COMP ASSIST: ABNORMAL /HPF (ref 0–4)
SAO2 % BLDV: 63 % — SIGNIFICANT CHANGE UP
SARS-COV-2 RNA SPEC QL NAA+PROBE: SIGNIFICANT CHANGE UP
SODIUM SERPL-SCNC: 137 MMOL/L — SIGNIFICANT CHANGE UP (ref 135–145)
SP GR SPEC: 1.02 — SIGNIFICANT CHANGE UP (ref 1.01–1.02)
TROPONIN T SERPL-MCNC: <0.01 NG/ML — SIGNIFICANT CHANGE UP (ref 0–0.06)
UROBILINOGEN FLD QL: NEGATIVE — SIGNIFICANT CHANGE UP
WBC # BLD: 9.61 K/UL — SIGNIFICANT CHANGE UP (ref 3.8–10.5)
WBC # FLD AUTO: 9.61 K/UL — SIGNIFICANT CHANGE UP (ref 3.8–10.5)
WBC UR QL: >50

## 2020-12-31 PROCEDURE — 73502 X-RAY EXAM HIP UNI 2-3 VIEWS: CPT | Mod: 26,RT

## 2020-12-31 PROCEDURE — 99223 1ST HOSP IP/OBS HIGH 75: CPT

## 2020-12-31 PROCEDURE — 71045 X-RAY EXAM CHEST 1 VIEW: CPT | Mod: 26

## 2020-12-31 PROCEDURE — 99285 EMERGENCY DEPT VISIT HI MDM: CPT

## 2020-12-31 RX ORDER — DONEPEZIL HYDROCHLORIDE 10 MG/1
5 TABLET, FILM COATED ORAL AT BEDTIME
Refills: 0 | Status: DISCONTINUED | OUTPATIENT
Start: 2020-12-31 | End: 2021-01-01

## 2020-12-31 RX ORDER — ENOXAPARIN SODIUM 100 MG/ML
30 INJECTION SUBCUTANEOUS DAILY
Refills: 0 | Status: DISCONTINUED | OUTPATIENT
Start: 2020-12-31 | End: 2020-12-31

## 2020-12-31 RX ORDER — SACCHAROMYCES BOULARDII 250 MG
250 POWDER IN PACKET (EA) ORAL
Refills: 0 | Status: DISCONTINUED | OUTPATIENT
Start: 2020-12-31 | End: 2021-01-04

## 2020-12-31 RX ORDER — ENOXAPARIN SODIUM 100 MG/ML
40 INJECTION SUBCUTANEOUS EVERY 24 HOURS
Refills: 0 | Status: DISCONTINUED | OUTPATIENT
Start: 2020-12-31 | End: 2021-01-04

## 2020-12-31 RX ORDER — SERTRALINE 25 MG/1
100 TABLET, FILM COATED ORAL DAILY
Refills: 0 | Status: DISCONTINUED | OUTPATIENT
Start: 2020-12-31 | End: 2021-01-04

## 2020-12-31 RX ORDER — CARBIDOPA AND LEVODOPA 25; 100 MG/1; MG/1
1 TABLET ORAL THREE TIMES A DAY
Refills: 0 | Status: DISCONTINUED | OUTPATIENT
Start: 2020-12-31 | End: 2021-01-04

## 2020-12-31 RX ORDER — ASPIRIN/CALCIUM CARB/MAGNESIUM 324 MG
81 TABLET ORAL DAILY
Refills: 0 | Status: DISCONTINUED | OUTPATIENT
Start: 2020-12-31 | End: 2021-01-04

## 2020-12-31 RX ORDER — ATORVASTATIN CALCIUM 80 MG/1
20 TABLET, FILM COATED ORAL AT BEDTIME
Refills: 0 | Status: DISCONTINUED | OUTPATIENT
Start: 2020-12-31 | End: 2021-01-04

## 2020-12-31 RX ORDER — SODIUM CHLORIDE 9 MG/ML
1000 INJECTION INTRAMUSCULAR; INTRAVENOUS; SUBCUTANEOUS
Refills: 0 | Status: DISCONTINUED | OUTPATIENT
Start: 2020-12-31 | End: 2021-01-01

## 2020-12-31 RX ORDER — OXYCODONE HYDROCHLORIDE 5 MG/1
0.5 TABLET ORAL
Qty: 0 | Refills: 0 | DISCHARGE

## 2020-12-31 RX ORDER — CEFPODOXIME PROXETIL 100 MG
1 TABLET ORAL
Qty: 0 | Refills: 0 | DISCHARGE

## 2020-12-31 RX ORDER — MEMANTINE HYDROCHLORIDE 10 MG/1
10 TABLET ORAL
Refills: 0 | Status: DISCONTINUED | OUTPATIENT
Start: 2020-12-31 | End: 2021-01-04

## 2020-12-31 RX ORDER — CEFTRIAXONE 500 MG/1
1000 INJECTION, POWDER, FOR SOLUTION INTRAMUSCULAR; INTRAVENOUS EVERY 24 HOURS
Refills: 0 | Status: COMPLETED | OUTPATIENT
Start: 2020-12-31 | End: 2021-01-02

## 2020-12-31 RX ORDER — SODIUM CHLORIDE 9 MG/ML
1850 INJECTION, SOLUTION INTRAVENOUS ONCE
Refills: 0 | Status: COMPLETED | OUTPATIENT
Start: 2020-12-31 | End: 2020-12-31

## 2020-12-31 RX ORDER — LANOLIN ALCOHOL/MO/W.PET/CERES
3 CREAM (GRAM) TOPICAL AT BEDTIME
Refills: 0 | Status: DISCONTINUED | OUTPATIENT
Start: 2020-12-31 | End: 2021-01-04

## 2020-12-31 RX ORDER — CEFTRIAXONE 500 MG/1
1000 INJECTION, POWDER, FOR SOLUTION INTRAMUSCULAR; INTRAVENOUS ONCE
Refills: 0 | Status: COMPLETED | OUTPATIENT
Start: 2020-12-31 | End: 2020-12-31

## 2020-12-31 RX ORDER — ACETAMINOPHEN 500 MG
650 TABLET ORAL EVERY 6 HOURS
Refills: 0 | Status: DISCONTINUED | OUTPATIENT
Start: 2020-12-31 | End: 2021-01-04

## 2020-12-31 RX ADMIN — Medication 3 MILLIGRAM(S): at 23:59

## 2020-12-31 RX ADMIN — Medication 250 MILLIGRAM(S): at 18:13

## 2020-12-31 RX ADMIN — MEMANTINE HYDROCHLORIDE 10 MILLIGRAM(S): 10 TABLET ORAL at 18:11

## 2020-12-31 RX ADMIN — CEFTRIAXONE 100 MILLIGRAM(S): 500 INJECTION, POWDER, FOR SOLUTION INTRAMUSCULAR; INTRAVENOUS at 18:10

## 2020-12-31 RX ADMIN — SODIUM CHLORIDE 75 MILLILITER(S): 9 INJECTION INTRAMUSCULAR; INTRAVENOUS; SUBCUTANEOUS at 18:10

## 2020-12-31 RX ADMIN — SODIUM CHLORIDE 1850 MILLILITER(S): 9 INJECTION, SOLUTION INTRAVENOUS at 14:00

## 2020-12-31 RX ADMIN — ATORVASTATIN CALCIUM 20 MILLIGRAM(S): 80 TABLET, FILM COATED ORAL at 23:59

## 2020-12-31 NOTE — ED ADULT TRIAGE NOTE - HEIGHT IN FEET
to Visit   Medication Sig Dispense Refill    cilostazol (PLETAL) 100 MG tablet       Milk Thistle 175 MG CAPS Take by mouth      Coenzyme Q10-Vitamin E 100-150 MG-UNIT CAPS Take by mouth      estradiol (ESTRACE VAGINAL) 0.1 MG/GM vaginal cream Place a pea-sized amount vaginally daily x 2 wks, then use 3 times per wk thereafter 1 Tube 3    trospium (SANCTURA) 20 MG tablet Take 1 tablet by mouth 2 times daily 60 tablet 11    sertraline (ZOLOFT) 50 MG tablet Take 50 mg by mouth daily      amLODIPine (NORVASC) 10 MG tablet Take 10 mg by mouth daily      losartan (COZAAR) 100 MG tablet Take 100 mg by mouth daily      hydrochlorothiazide (HYDRODIURIL) 25 MG tablet Take 25 mg by mouth daily      metFORMIN (GLUCOPHAGE) 500 MG tablet Take 500 mg by mouth 2 times daily (with meals)       No current facility-administered medications on file prior to visit. Outpatient Encounter Prescriptions as of 7/9/2018   Medication Sig Dispense Refill    cilostazol (PLETAL) 100 MG tablet       Milk Thistle 175 MG CAPS Take by mouth      Coenzyme Q10-Vitamin E 100-150 MG-UNIT CAPS Take by mouth      estradiol (ESTRACE VAGINAL) 0.1 MG/GM vaginal cream Place a pea-sized amount vaginally daily x 2 wks, then use 3 times per wk thereafter 1 Tube 3    trospium (SANCTURA) 20 MG tablet Take 1 tablet by mouth 2 times daily 60 tablet 11    sertraline (ZOLOFT) 50 MG tablet Take 50 mg by mouth daily      amLODIPine (NORVASC) 10 MG tablet Take 10 mg by mouth daily      losartan (COZAAR) 100 MG tablet Take 100 mg by mouth daily      hydrochlorothiazide (HYDRODIURIL) 25 MG tablet Take 25 mg by mouth daily      metFORMIN (GLUCOPHAGE) 500 MG tablet Take 500 mg by mouth 2 times daily (with meals)       No facility-administered encounter medications on file as of 7/9/2018.       Aspirin  History   Smoking Status    Former Smoker    Packs/day: 1.00    Years: 20.00    Types: Cigarettes    Quit date: 10/12/1992   Smokeless Tobacco up with us in 8 weeks. she is clear now from a microscopic hematuria standpoint. 5

## 2020-12-31 NOTE — ED PROVIDER NOTE - CARE PLAN
Principal Discharge DX:	Acute cystitis without hematuria  Secondary Diagnosis:	Metabolic encephalopathy

## 2020-12-31 NOTE — H&P ADULT - ASSESSMENT
84 yo F with hx of Dementia a&ox1-2 at baseline, lives with 24/7 aide was brought to the ED for increased lethargy. In the ED patient found to have positive UA and started on ceftriaxone for UTI.     Increased Lethargy and AMS due to UTI  + UA  - follow ucx  - start ceftriaxone  - probiotic  - monitor mental status  - PT eval    Acute UTI  - follow ucx  - start ceftriaxone    Alzheimer demenita  depression  - will need to verify meds with daughter once able to get in touch with her.     DVT ppx: Lovenox         84 yo F with hx of Dementia a&ox1-2 at baseline, lives with 24/7 aide was brought to the ED for increased lethargy. In the ED patient found to have positive UA and started on ceftriaxone for UTI.     Increased Lethargy and AMS due to UTI  + UA  - follow ucx  - start ceftriaxone  - probiotic  - monitor mental status  - PT eval  - IVFs    Acute UTI  - follow ucx  - start ceftriaxone    Alzheimer dementia  depression  - will need to verify meds with daughter once able to get in touch with her.   -c/w sinemet, donepezil, memantine  -c/w zoloft     Severe Aortic stenosis s/p aortic balloon valvuloplasty  - asa and statin    DVT ppx: Lovenox

## 2020-12-31 NOTE — H&P ADULT - NSHPSOCIALHISTORY_GEN_ALL_CORE
Lives with 24/7 aides  patient unable to communicate additional social hx due to mental status. attempted to call daughter but no answer.

## 2020-12-31 NOTE — ED PROVIDER NOTE - PHYSICAL EXAMINATION
Const: Awake, and oriented 1, somnolent, but arouses to voice. In no acute distress. Well appearing.  HEENT: NC/AT. Moist mucous membranes.  Eyes: No scleral icterus. EOMI.  Neck:. Soft and supple. Full ROM without pain.  Cardiac: Regular rate and regular rhythm. +S1/S2. + murmur. Peripheral pulses 2+ and symmetric. No LE edema.  Resp: Speaking in full sentences. No evidence of respiratory distress. No wheezes, rales or rhonchi.  Abd: Soft, non-tender, non-distended. Normal bowel sounds in all 4 quadrants. No guarding or rebound.  Back: Spine midline and non-tender. No CVAT.  Skin: No rashes, abrasions or lacerations.  MSK: Pelvis stable, + TTP over right greater trochanter, full ROM b/l LE.  Neuro: Awake, alert & oriented x 1. Moves all extremities symmetrically.

## 2020-12-31 NOTE — ED ADULT TRIAGE NOTE - CHIEF COMPLAINT QUOTE
pt with baseline dementia BIBA c/o subjective fever that began today as per family. No known covid exposure. No difficulty breathing or chest pain

## 2020-12-31 NOTE — ED PROVIDER NOTE - OBJECTIVE STATEMENT
84 y/o F with baseline dementia (A&O x 2), frequent falls, who lives at home with a 24/7 aide presents for gradually worsening altered mental status per the daughter Chey who provides the history. Patient has had increasing lethargy, not acting herself, could not get out of bed this morning, which is not her baseline, usually ambulatory with a walker and able to help get herself dressed. She was diaphoretic and felt hot, but no temperature was taken, but daughter thought she felt feverish. Daughter denies coughing, SOB, vomiting, diarrhea. She had a similar presentation in October when she had a UTI. She is s/p a right partial hip arthroscopy and a valvuloplasty also in October. She went to rehab and was discharged to home just before Thanksgiving. Daughter does not believe the patient has had any recent falls, but was complaining about right hip pain, which she does at times. Patient cannot provide any history.

## 2020-12-31 NOTE — H&P ADULT - HISTORY OF PRESENT ILLNESS
Ms. Roa 86 yo F with hx of Dementia a&ox1-2 at baseline, lives with 24/7 aide was brought to the ED for increased lethargy. Patient is poor historian and I attempted to call Chey daughter but no answer. hx obtained from chart. Patient has had increasing lethargy, not acting herself, could not get out of bed this morning, which is not her baseline, usually ambulatory with a walker and able to help get herself dressed. She was diaphoretic and felt hot, but no temperature was taken, but daughter thought she felt feverish. Daughter denies coughing, SOB, vomiting, diarrhea. She had a similar presentation in October when she had a UTI.   In the ED patient found to have positive UA and started on ceftriaxone for UTI.

## 2020-12-31 NOTE — ED PROVIDER NOTE - WET READ LAUNCH FT
Detail Level: Detailed Morphology Per Location (Optional): Dark brown macule Size Of Lesion: 2mm Morphology Per Location (Optional): Pink brown macule There are no Wet Read(s) to document.

## 2020-12-31 NOTE — ED PROVIDER NOTE - CLINICAL SUMMARY MEDICAL DECISION MAKING FREE TEXT BOX
84 y/o F with advanced dementia presents with AMS, increased lethargy, afebrile orally in the ED, will check rectal temp. Altered, A&O x 1 at this time, normally A&Ox2. Will evaluate for infectious etiology, XR right hip as patient is complaining of pain, no signs of trauma, no history of fall. IV hydration. Patient will require admission - daughter aware and in agreement.

## 2021-01-01 LAB
SARS-COV-2 IGG SERPL QL IA: NEGATIVE — SIGNIFICANT CHANGE UP
SARS-COV-2 IGM SERPL IA-ACNC: <0.1 INDEX — SIGNIFICANT CHANGE UP

## 2021-01-01 PROCEDURE — 99233 SBSQ HOSP IP/OBS HIGH 50: CPT

## 2021-01-01 RX ORDER — DONEPEZIL HYDROCHLORIDE 10 MG/1
10 TABLET, FILM COATED ORAL DAILY
Refills: 0 | Status: DISCONTINUED | OUTPATIENT
Start: 2021-01-01 | End: 2021-01-04

## 2021-01-01 RX ORDER — FERROUS SULFATE 325(65) MG
325 TABLET ORAL DAILY
Refills: 0 | Status: DISCONTINUED | OUTPATIENT
Start: 2021-01-01 | End: 2021-01-04

## 2021-01-01 RX ADMIN — Medication 250 MILLIGRAM(S): at 05:26

## 2021-01-01 RX ADMIN — SODIUM CHLORIDE 75 MILLILITER(S): 9 INJECTION INTRAMUSCULAR; INTRAVENOUS; SUBCUTANEOUS at 05:26

## 2021-01-01 RX ADMIN — CARBIDOPA AND LEVODOPA 1 TABLET(S): 25; 100 TABLET ORAL at 05:26

## 2021-01-01 RX ADMIN — SODIUM CHLORIDE 75 MILLILITER(S): 9 INJECTION INTRAMUSCULAR; INTRAVENOUS; SUBCUTANEOUS at 12:40

## 2021-01-01 RX ADMIN — Medication 3 MILLIGRAM(S): at 22:07

## 2021-01-01 RX ADMIN — CARBIDOPA AND LEVODOPA 1 TABLET(S): 25; 100 TABLET ORAL at 22:07

## 2021-01-01 RX ADMIN — CEFTRIAXONE 100 MILLIGRAM(S): 500 INJECTION, POWDER, FOR SOLUTION INTRAMUSCULAR; INTRAVENOUS at 17:41

## 2021-01-01 RX ADMIN — DONEPEZIL HYDROCHLORIDE 10 MILLIGRAM(S): 10 TABLET, FILM COATED ORAL at 12:40

## 2021-01-01 RX ADMIN — ENOXAPARIN SODIUM 40 MILLIGRAM(S): 100 INJECTION SUBCUTANEOUS at 12:23

## 2021-01-01 RX ADMIN — SERTRALINE 100 MILLIGRAM(S): 25 TABLET, FILM COATED ORAL at 12:26

## 2021-01-01 RX ADMIN — ATORVASTATIN CALCIUM 20 MILLIGRAM(S): 80 TABLET, FILM COATED ORAL at 22:07

## 2021-01-01 RX ADMIN — MEMANTINE HYDROCHLORIDE 10 MILLIGRAM(S): 10 TABLET ORAL at 17:41

## 2021-01-01 RX ADMIN — CARBIDOPA AND LEVODOPA 1 TABLET(S): 25; 100 TABLET ORAL at 00:12

## 2021-01-01 RX ADMIN — Medication 81 MILLIGRAM(S): at 12:39

## 2021-01-01 RX ADMIN — CARBIDOPA AND LEVODOPA 1 TABLET(S): 25; 100 TABLET ORAL at 14:38

## 2021-01-01 RX ADMIN — MEMANTINE HYDROCHLORIDE 10 MILLIGRAM(S): 10 TABLET ORAL at 05:25

## 2021-01-01 RX ADMIN — Medication 250 MILLIGRAM(S): at 17:42

## 2021-01-01 RX ADMIN — DONEPEZIL HYDROCHLORIDE 5 MILLIGRAM(S): 10 TABLET, FILM COATED ORAL at 00:12

## 2021-01-01 RX ADMIN — Medication 325 MILLIGRAM(S): at 12:39

## 2021-01-01 NOTE — PROGRESS NOTE ADULT - ASSESSMENT
84 yo F with hx of Dementia a&ox1-2 at baseline, lives with 24/7 aide was brought to the ED for increased lethargy. In the ED patient found to have positive UA and started on ceftriaxone for UTI.     Increased Lethargy and AMS due to UTI, improving  + UA  - follow ucx  - continue ceftriaxone  - probiotic  - monitor mental status  - PT eval  - stop ivfs if eating    Acute UTI  - follow ucx  - continue ceftriaxone    Alzheimer dementia  depression  - home meds verified with Daughter Chey  -c/w sinemet, donepezil, memantine  -c/w zoloft     Severe Aortic stenosis s/p aortic balloon valvuloplasty  - asa and statin    DVT ppx: Lovenox  Dispo: home in 24-48 hours, lives with 24/7 aides. Daughter Chey Updated.

## 2021-01-02 LAB
ANION GAP SERPL CALC-SCNC: 12 MMOL/L — SIGNIFICANT CHANGE UP (ref 5–17)
BUN SERPL-MCNC: 23 MG/DL — HIGH (ref 8–20)
CALCIUM SERPL-MCNC: 9.7 MG/DL — SIGNIFICANT CHANGE UP (ref 8.6–10.2)
CHLORIDE SERPL-SCNC: 107 MMOL/L — SIGNIFICANT CHANGE UP (ref 98–107)
CO2 SERPL-SCNC: 22 MMOL/L — SIGNIFICANT CHANGE UP (ref 22–29)
CREAT SERPL-MCNC: 0.8 MG/DL — SIGNIFICANT CHANGE UP (ref 0.5–1.3)
GLUCOSE SERPL-MCNC: 125 MG/DL — HIGH (ref 70–99)
HCT VFR BLD CALC: 33.7 % — LOW (ref 34.5–45)
HGB BLD-MCNC: 11.1 G/DL — LOW (ref 11.5–15.5)
MAGNESIUM SERPL-MCNC: 2 MG/DL — SIGNIFICANT CHANGE UP (ref 1.8–2.6)
MCHC RBC-ENTMCNC: 29 PG — SIGNIFICANT CHANGE UP (ref 27–34)
MCHC RBC-ENTMCNC: 32.9 GM/DL — SIGNIFICANT CHANGE UP (ref 32–36)
MCV RBC AUTO: 88 FL — SIGNIFICANT CHANGE UP (ref 80–100)
PLATELET # BLD AUTO: 93 K/UL — LOW (ref 150–400)
POTASSIUM SERPL-MCNC: 3.4 MMOL/L — LOW (ref 3.5–5.3)
POTASSIUM SERPL-SCNC: 3.4 MMOL/L — LOW (ref 3.5–5.3)
RBC # BLD: 3.83 M/UL — SIGNIFICANT CHANGE UP (ref 3.8–5.2)
RBC # FLD: 12.8 % — SIGNIFICANT CHANGE UP (ref 10.3–14.5)
SODIUM SERPL-SCNC: 141 MMOL/L — SIGNIFICANT CHANGE UP (ref 135–145)
WBC # BLD: 8.16 K/UL — SIGNIFICANT CHANGE UP (ref 3.8–10.5)
WBC # FLD AUTO: 8.16 K/UL — SIGNIFICANT CHANGE UP (ref 3.8–10.5)

## 2021-01-02 PROCEDURE — 99232 SBSQ HOSP IP/OBS MODERATE 35: CPT

## 2021-01-02 RX ORDER — POTASSIUM CHLORIDE 20 MEQ
20 PACKET (EA) ORAL ONCE
Refills: 0 | Status: COMPLETED | OUTPATIENT
Start: 2021-01-02 | End: 2021-01-02

## 2021-01-02 RX ADMIN — CEFTRIAXONE 100 MILLIGRAM(S): 500 INJECTION, POWDER, FOR SOLUTION INTRAMUSCULAR; INTRAVENOUS at 17:30

## 2021-01-02 RX ADMIN — MEMANTINE HYDROCHLORIDE 10 MILLIGRAM(S): 10 TABLET ORAL at 05:14

## 2021-01-02 RX ADMIN — ATORVASTATIN CALCIUM 20 MILLIGRAM(S): 80 TABLET, FILM COATED ORAL at 21:31

## 2021-01-02 RX ADMIN — SERTRALINE 100 MILLIGRAM(S): 25 TABLET, FILM COATED ORAL at 12:31

## 2021-01-02 RX ADMIN — DONEPEZIL HYDROCHLORIDE 10 MILLIGRAM(S): 10 TABLET, FILM COATED ORAL at 12:31

## 2021-01-02 RX ADMIN — Medication 325 MILLIGRAM(S): at 12:31

## 2021-01-02 RX ADMIN — Medication 20 MILLIEQUIVALENT(S): at 12:31

## 2021-01-02 RX ADMIN — ENOXAPARIN SODIUM 40 MILLIGRAM(S): 100 INJECTION SUBCUTANEOUS at 12:31

## 2021-01-02 RX ADMIN — Medication 81 MILLIGRAM(S): at 12:31

## 2021-01-02 RX ADMIN — Medication 250 MILLIGRAM(S): at 17:28

## 2021-01-02 RX ADMIN — CARBIDOPA AND LEVODOPA 1 TABLET(S): 25; 100 TABLET ORAL at 15:33

## 2021-01-02 RX ADMIN — Medication 3 MILLIGRAM(S): at 21:31

## 2021-01-02 RX ADMIN — CARBIDOPA AND LEVODOPA 1 TABLET(S): 25; 100 TABLET ORAL at 05:14

## 2021-01-02 RX ADMIN — MEMANTINE HYDROCHLORIDE 10 MILLIGRAM(S): 10 TABLET ORAL at 17:28

## 2021-01-02 RX ADMIN — CARBIDOPA AND LEVODOPA 1 TABLET(S): 25; 100 TABLET ORAL at 21:32

## 2021-01-02 RX ADMIN — Medication 250 MILLIGRAM(S): at 05:14

## 2021-01-02 NOTE — PROGRESS NOTE ADULT - ASSESSMENT
84 yo F with hx of Dementia a&ox1-2 at baseline, lives with 24/7 aide was brought to the ED for increased lethargy. In the ED patient found to have positive UA and started on ceftriaxone for UTI.     Increased Lethargy and AMS due to UTI, resolved  back at baseline, mental status which is alert and oriented 0-1 (self)  + UA, ucx >100,000 GNR  - follow ucx for sensitivites  - continue ceftriaxone  - probiotic  - monitor mental status  - PT eval pending    Acute UTI  - follow ucx, ucx >100,000 GNR  - continue ceftriaxone    Alzheimer dementia  depression  - home meds verified with Daughter Chey  -c/w sinemet, donepezil, memantine  -c/w zoloft     Severe Aortic stenosis s/p aortic balloon valvuloplasty  - asa and statin    DVT ppx: Lovenox  Dispo:, lives with 24/7 aides. pending PT eval. Daughter Chey Updated, plan for home Monday Daughter need time to arrange Aides.      86 yo F with hx of Dementia a&ox1-2 at baseline, lives with 24/7 aide was brought to the ED for increased lethargy. In the ED patient found to have positive UA and started on ceftriaxone for UTI.     Increased Lethargy and AMS due to UTI, resolved  back at baseline, mental status which is alert and oriented 0-1 (self)  + UA, ucx >100,000 GNR  - follow ucx for sensitivites  - continue ceftriaxone  - probiotic  - monitor mental status  - PT eval pending    Acute UTI  - follow ucx, ucx >100,000 GNR  - continue ceftriaxone    Alzheimer dementia  depression  - home meds verified with Daughter Chey  -c/w sinemet, donepezil, memantine  -c/w zoloft     Severe Aortic stenosis s/p aortic balloon valvuloplasty  - asa and statin    Hypokalemia  - replaced    DVT ppx: Lovenox  Dispo:, lives with 24/7 aides. pending PT eval. Daughter Chey Updated, plan for home Monday Daughter need time to arrange Aides.

## 2021-01-03 ENCOUNTER — RX RENEWAL (OUTPATIENT)
Age: 86
End: 2021-01-03

## 2021-01-03 LAB
-  AMIKACIN: SIGNIFICANT CHANGE UP
-  AMOXICILLIN/CLAVULANIC ACID: SIGNIFICANT CHANGE UP
-  AMPICILLIN/SULBACTAM: SIGNIFICANT CHANGE UP
-  AMPICILLIN: SIGNIFICANT CHANGE UP
-  AZTREONAM: SIGNIFICANT CHANGE UP
-  CEFAZOLIN: SIGNIFICANT CHANGE UP
-  CEFEPIME: SIGNIFICANT CHANGE UP
-  CEFOXITIN: SIGNIFICANT CHANGE UP
-  CEFTRIAXONE: SIGNIFICANT CHANGE UP
-  CIPROFLOXACIN: SIGNIFICANT CHANGE UP
-  ERTAPENEM: SIGNIFICANT CHANGE UP
-  GENTAMICIN: SIGNIFICANT CHANGE UP
-  IMIPENEM: SIGNIFICANT CHANGE UP
-  LEVOFLOXACIN: SIGNIFICANT CHANGE UP
-  MEROPENEM: SIGNIFICANT CHANGE UP
-  NITROFURANTOIN: SIGNIFICANT CHANGE UP
-  PIPERACILLIN/TAZOBACTAM: SIGNIFICANT CHANGE UP
-  TIGECYCLINE: SIGNIFICANT CHANGE UP
-  TOBRAMYCIN: SIGNIFICANT CHANGE UP
-  TRIMETHOPRIM/SULFAMETHOXAZOLE: SIGNIFICANT CHANGE UP
ANION GAP SERPL CALC-SCNC: 9 MMOL/L — SIGNIFICANT CHANGE UP (ref 5–17)
BUN SERPL-MCNC: 19 MG/DL — SIGNIFICANT CHANGE UP (ref 8–20)
CALCIUM SERPL-MCNC: 9.1 MG/DL — SIGNIFICANT CHANGE UP (ref 8.6–10.2)
CHLORIDE SERPL-SCNC: 108 MMOL/L — HIGH (ref 98–107)
CO2 SERPL-SCNC: 24 MMOL/L — SIGNIFICANT CHANGE UP (ref 22–29)
CREAT SERPL-MCNC: 0.65 MG/DL — SIGNIFICANT CHANGE UP (ref 0.5–1.3)
GLUCOSE SERPL-MCNC: 123 MG/DL — HIGH (ref 70–99)
HCT VFR BLD CALC: 32.4 % — LOW (ref 34.5–45)
HGB BLD-MCNC: 10.9 G/DL — LOW (ref 11.5–15.5)
MAGNESIUM SERPL-MCNC: 2 MG/DL — SIGNIFICANT CHANGE UP (ref 1.6–2.6)
MCHC RBC-ENTMCNC: 28.9 PG — SIGNIFICANT CHANGE UP (ref 27–34)
MCHC RBC-ENTMCNC: 33.6 GM/DL — SIGNIFICANT CHANGE UP (ref 32–36)
MCV RBC AUTO: 85.9 FL — SIGNIFICANT CHANGE UP (ref 80–100)
METHOD TYPE: SIGNIFICANT CHANGE UP
PLATELET # BLD AUTO: 116 K/UL — LOW (ref 150–400)
POTASSIUM SERPL-MCNC: 3.2 MMOL/L — LOW (ref 3.5–5.3)
POTASSIUM SERPL-SCNC: 3.2 MMOL/L — LOW (ref 3.5–5.3)
RBC # BLD: 3.77 M/UL — LOW (ref 3.8–5.2)
RBC # FLD: 12.7 % — SIGNIFICANT CHANGE UP (ref 10.3–14.5)
SODIUM SERPL-SCNC: 141 MMOL/L — SIGNIFICANT CHANGE UP (ref 135–145)
WBC # BLD: 8.48 K/UL — SIGNIFICANT CHANGE UP (ref 3.8–10.5)
WBC # FLD AUTO: 8.48 K/UL — SIGNIFICANT CHANGE UP (ref 3.8–10.5)

## 2021-01-03 PROCEDURE — 99232 SBSQ HOSP IP/OBS MODERATE 35: CPT

## 2021-01-03 RX ORDER — POTASSIUM CHLORIDE 20 MEQ
40 PACKET (EA) ORAL ONCE
Refills: 0 | Status: COMPLETED | OUTPATIENT
Start: 2021-01-03 | End: 2021-01-03

## 2021-01-03 RX ORDER — POTASSIUM CHLORIDE 20 MEQ
10 PACKET (EA) ORAL
Refills: 0 | Status: COMPLETED | OUTPATIENT
Start: 2021-01-03 | End: 2021-01-03

## 2021-01-03 RX ADMIN — Medication 100 MILLIEQUIVALENT(S): at 16:16

## 2021-01-03 RX ADMIN — SERTRALINE 100 MILLIGRAM(S): 25 TABLET, FILM COATED ORAL at 12:01

## 2021-01-03 RX ADMIN — Medication 325 MILLIGRAM(S): at 12:01

## 2021-01-03 RX ADMIN — DONEPEZIL HYDROCHLORIDE 10 MILLIGRAM(S): 10 TABLET, FILM COATED ORAL at 12:01

## 2021-01-03 RX ADMIN — Medication 40 MILLIEQUIVALENT(S): at 13:16

## 2021-01-03 RX ADMIN — Medication 3 MILLIGRAM(S): at 23:24

## 2021-01-03 RX ADMIN — Medication 250 MILLIGRAM(S): at 18:03

## 2021-01-03 RX ADMIN — CARBIDOPA AND LEVODOPA 1 TABLET(S): 25; 100 TABLET ORAL at 23:24

## 2021-01-03 RX ADMIN — Medication 81 MILLIGRAM(S): at 12:01

## 2021-01-03 RX ADMIN — Medication 100 MILLIEQUIVALENT(S): at 15:08

## 2021-01-03 RX ADMIN — CARBIDOPA AND LEVODOPA 1 TABLET(S): 25; 100 TABLET ORAL at 15:08

## 2021-01-03 RX ADMIN — Medication 250 MILLIGRAM(S): at 05:10

## 2021-01-03 RX ADMIN — CARBIDOPA AND LEVODOPA 1 TABLET(S): 25; 100 TABLET ORAL at 05:10

## 2021-01-03 RX ADMIN — ENOXAPARIN SODIUM 40 MILLIGRAM(S): 100 INJECTION SUBCUTANEOUS at 12:01

## 2021-01-03 RX ADMIN — Medication 100 MILLIEQUIVALENT(S): at 13:16

## 2021-01-03 RX ADMIN — MEMANTINE HYDROCHLORIDE 10 MILLIGRAM(S): 10 TABLET ORAL at 05:10

## 2021-01-03 RX ADMIN — MEMANTINE HYDROCHLORIDE 10 MILLIGRAM(S): 10 TABLET ORAL at 18:03

## 2021-01-03 RX ADMIN — ATORVASTATIN CALCIUM 20 MILLIGRAM(S): 80 TABLET, FILM COATED ORAL at 23:24

## 2021-01-03 NOTE — PROGRESS NOTE ADULT - SUBJECTIVE AND OBJECTIVE BOX
Chief complaint: AMS    Patient seen and examined at bedside. No acute overnight events reported. Patient states she is doing well and has no complaints. Denies cough, chest pain, shortness of breath, nausea or vomiting.     Vital Signs Last 24 Hrs  T(F): 98.3 (03 Jan 2021 08:40), Max: 98.7 (02 Jan 2021 16:09)  HR: 74 (03 Jan 2021 08:40) (74 - 74)  BP: 170/71 (03 Jan 2021 08:40) (154/80 - 170/71)  RR: 18 (03 Jan 2021 08:40) (18 - 18)  SpO2: 98% (03 Jan 2021 08:40) (98% - 98%)    Physical Exam:  Constitutional: alert, in no acute distress  Neck: Soft and supple  Respiratory: Clear to auscultation bilaterally, no wheezes or crackles  Cardiovascular: Regular rate and rhythm no murmurs, gallops, rubs  Gastrointestinal: Soft, non-tender to palpation, +bs  Vascular: 2+ peripheral pulses  Neurological: A/O x 0-1, no focal neurological deficits  Musculoskeletal:  no lower extremity edema bilaterally    Labs:                        10.9   8.48  )-----------( 116      ( 03 Jan 2021 07:23 )             32.4   01-03    141  |  108<H>  |  19.0  ----------------------------<  123<H>  3.2<L>   |  24.0  |  0.65    Ca    9.1      03 Jan 2021 07:23  Mg     2.0     01-03    
[facility] Division of Hospital Medicine    Chief Complaint:  AMS/Letharghy    SUBJECTIVE / OVERNIGHT EVENTS: Patient seen and examined. Confused but she is awake and alert. No pain. No overnight events    Patient denies chest pain, SOB, abd pain, N/V, fever, chills, dysuria or any other complaints. All remainder ROS negative.     MEDICATIONS  (STANDING):  aspirin enteric coated 81 milliGRAM(s) Oral daily  atorvastatin 20 milliGRAM(s) Oral at bedtime  carbidopa/levodopa  25/100 1 Tablet(s) Oral three times a day  cefTRIAXone   IVPB 1000 milliGRAM(s) IV Intermittent every 24 hours  donepezil 10 milliGRAM(s) Oral daily  enoxaparin Injectable 40 milliGRAM(s) SubCutaneous every 24 hours  ferrous    sulfate 325 milliGRAM(s) Oral daily  melatonin 3 milliGRAM(s) Oral at bedtime  memantine 10 milliGRAM(s) Oral two times a day  potassium chloride    Tablet ER 20 milliEquivalent(s) Oral once  saccharomyces boulardii 250 milliGRAM(s) Oral two times a day  sertraline 100 milliGRAM(s) Oral daily    MEDICATIONS  (PRN):  acetaminophen   Tablet .. 650 milliGRAM(s) Oral every 6 hours PRN Temp greater or equal to 38C (100.4F), Mild Pain (1 - 3)        I&O's Summary      PHYSICAL EXAM:  Vital Signs Last 24 Hrs  T(C): 36.9 (02 Jan 2021 07:31), Max: 37.1 (01 Jan 2021 16:05)  T(F): 98.5 (02 Jan 2021 07:31), Max: 98.7 (01 Jan 2021 16:05)  HR: 73 (02 Jan 2021 07:31) (73 - 98)  BP: 158/70 (02 Jan 2021 07:31) (130/68 - 165/69)  BP(mean): --  RR: 18 (02 Jan 2021 07:31) (18 - 18)  SpO2: 98% (02 Jan 2021 07:31) (94% - 98%)      CONSTITUTIONAL: NAD, well-developed, well-groomed, elderly  ENMT: Moist oral mucosa, no pharyngeal injection or exudates;  RESPIRATORY: Normal respiratory effort; lungs are clear to auscultation bilaterally  CARDIOVASCULAR: Regular rate and rhythm, normal S1 and S2, No lower extremity edema  ABDOMEN: Nontender to palpation, normoactive bowel sounds, no rebound/guarding;   MUSCLOSKELETAL:   no clubbing or cyanosis of digits; no joint swelling or tenderness to palpation  PSYCH: A+O x0-1  NEUROLOGY: CN 2-12 are intact and symmetric; no gross sensory deficits;   SKIN: No rashes; no palpable lesions    LABS:                        14.1   9.61  )-----------( 109      ( 31 Dec 2020 12:46 )             42.9     01-02    141  |  107  |  23.0<H>  ----------------------------<  125<H>  3.4<L>   |  22.0  |  0.80    Ca    9.7      02 Jan 2021 09:35  Mg     2.0     01-02    TPro  7.0  /  Alb  3.8  /  TBili  1.2  /  DBili  x   /  AST  18  /  ALT  12  /  AlkPhos  115  12-31    PT/INR - ( 31 Dec 2020 12:46 )   PT: 12.4 sec;   INR: 1.07 ratio         PTT - ( 31 Dec 2020 12:46 )  PTT:50.7 sec  CARDIAC MARKERS ( 31 Dec 2020 12:46 )  x     / <0.01 ng/mL / x     / x     / x          Urinalysis Basic - ( 31 Dec 2020 14:07 )    Color: x / Appearance: x / SG: x / pH: x  Gluc: x / Ketone: x  / Bili: x / Urobili: x   Blood: x / Protein: x / Nitrite: x   Leuk Esterase: x / RBC: 3-5 /HPF / WBC >50   Sq Epi: x / Non Sq Epi: Occasional / Bacteria: Moderate        Culture - Urine (collected 31 Dec 2020 22:03)  Source: .Urine Clean Catch (Midstream)  Preliminary Report (02 Jan 2021 09:07):    >100,000 CFU/ml Gram Negative Rods      CAPILLARY BLOOD GLUCOSE            RADIOLOGY & ADDITIONAL TESTS:  Results Reviewed:   Imaging Personally Reviewed:  Electrocardiogram Personally Reviewed:                                          
[facility] Division of Hospital Medicine    Chief Complaint: AMS    SUBJECTIVE / OVERNIGHT EVENTS: Patient seen and examined. More alert and awake today but still confused. Spoke to daughter, baseline mental status maybe oriented to self.     Patient denies chest pain, SOB, abd pain, N/V, fever, chills, dysuria or any other complaints. All remainder ROS negative.     MEDICATIONS  (STANDING):  aspirin enteric coated 81 milliGRAM(s) Oral daily  atorvastatin 20 milliGRAM(s) Oral at bedtime  carbidopa/levodopa  25/100 1 Tablet(s) Oral three times a day  cefTRIAXone   IVPB 1000 milliGRAM(s) IV Intermittent every 24 hours  donepezil 10 milliGRAM(s) Oral daily  enoxaparin Injectable 40 milliGRAM(s) SubCutaneous every 24 hours  ferrous    sulfate 325 milliGRAM(s) Oral daily  melatonin 3 milliGRAM(s) Oral at bedtime  memantine 10 milliGRAM(s) Oral two times a day  saccharomyces boulardii 250 milliGRAM(s) Oral two times a day  sertraline 100 milliGRAM(s) Oral daily  sodium chloride 0.9%. 1000 milliLiter(s) (75 mL/Hr) IV Continuous <Continuous>    MEDICATIONS  (PRN):  acetaminophen   Tablet .. 650 milliGRAM(s) Oral every 6 hours PRN Temp greater or equal to 38C (100.4F), Mild Pain (1 - 3)        I&O's Summary      PHYSICAL EXAM:  Vital Signs Last 24 Hrs  T(C): 36.4 (01 Jan 2021 07:58), Max: 37.3 (31 Dec 2020 12:12)  T(F): 97.5 (01 Jan 2021 07:58), Max: 99.2 (31 Dec 2020 12:12)  HR: 83 (01 Jan 2021 07:58) (61 - 83)  BP: 152/77 (01 Jan 2021 07:58) (141/71 - 157/69)  BP(mean): --  RR: 18 (01 Jan 2021 07:58) (18 - 18)  SpO2: 98% (01 Jan 2021 07:58) (96% - 98%)        CONSTITUTIONAL: NAD, well-developed, well-groomed, elderly  ENMT: Moist oral mucosa, no pharyngeal injection or exudates;  RESPIRATORY: Normal respiratory effort; lungs are clear to auscultation bilaterally  CARDIOVASCULAR: Regular rate and rhythm, normal S1 and S2, no murmur/rub/gallop; No lower extremity edema  ABDOMEN: Nontender to palpation, normoactive bowel sounds, no rebound/guarding;   MUSCLOSKELETAL:   no clubbing or cyanosis of digits; no joint swelling or tenderness to palpation  PSYCH: A+O x0-1  NEUROLOGY: CN 2-12 are intact and symmetric; no gross sensory deficits;   SKIN: No rashes; no palpable lesions    LABS:                        14.1   9.61  )-----------( 109      ( 31 Dec 2020 12:46 )             42.9     12-31    137  |  102  |  22.0<H>  ----------------------------<  121<H>  4.1   |  23.0  |  0.88    Ca    9.7      31 Dec 2020 12:46    TPro  7.0  /  Alb  3.8  /  TBili  1.2  /  DBili  x   /  AST  18  /  ALT  12  /  AlkPhos  115  12-31    PT/INR - ( 31 Dec 2020 12:46 )   PT: 12.4 sec;   INR: 1.07 ratio         PTT - ( 31 Dec 2020 12:46 )  PTT:50.7 sec  CARDIAC MARKERS ( 31 Dec 2020 12:46 )  x     / <0.01 ng/mL / x     / x     / x          Urinalysis Basic - ( 31 Dec 2020 14:07 )    Color: x / Appearance: x / SG: x / pH: x  Gluc: x / Ketone: x  / Bili: x / Urobili: x   Blood: x / Protein: x / Nitrite: x   Leuk Esterase: x / RBC: 3-5 /HPF / WBC >50   Sq Epi: x / Non Sq Epi: Occasional / Bacteria: Moderate        CAPILLARY BLOOD GLUCOSE            RADIOLOGY & ADDITIONAL TESTS:  Results Reviewed:   Imaging Personally Reviewed:  Electrocardiogram Personally Reviewed:

## 2021-01-03 NOTE — PHYSICAL THERAPY INITIAL EVALUATION ADULT - PERTINENT HX OF CURRENT PROBLEM, REHAB EVAL
Pt is an 86 y/o female who presented from home with lethargy. Pt has hx/o alzheimer disease. (+) UTI.

## 2021-01-03 NOTE — PHYSICAL THERAPY INITIAL EVALUATION ADULT - ADDITIONAL COMMENTS
Per chart, pt lives with home health aide 24/7 and ambulates with RW. Unclear if pt has stairs to negotiate. Pt is A&O x 1 and no care coordination note at this time.

## 2021-01-03 NOTE — PROGRESS NOTE ADULT - ASSESSMENT
84 yo F with hx of Dementia a&ox1-2 at baseline, lives with 24/7 aide was brought to the ED for increased lethargy. In the ED patient found to have positive UA and started on ceftriaxone for UTI.     Increased Lethargy and AMS due to UTI, resolved  back at baseline, mental status which is alert and oriented 0-1 (self)  + UA, ucx >100,000 GNR  - s/p Ceftriaxone  - c/w Florastor  - monitor mental status  - PT eval pending    Alzheimer dementia  depression  - home meds verified with Daughter Chey  -c/w sinemet, donepezil, memantine  -c/w zoloft     Severe Aortic stenosis s/p aortic balloon valvuloplasty  - asa and statin    Hypokalemia  - replaced  - monitor BMP    DVT ppx: Lovenox  Dispo:, lives with 24/7 aides. pending PT eval. Daughter Chey Updated, plan for home Monday Daughter need time to arrange Aides.

## 2021-01-04 ENCOUNTER — TRANSCRIPTION ENCOUNTER (OUTPATIENT)
Age: 86
End: 2021-01-04

## 2021-01-04 VITALS
RESPIRATION RATE: 18 BRPM | HEART RATE: 68 BPM | SYSTOLIC BLOOD PRESSURE: 110 MMHG | OXYGEN SATURATION: 96 % | DIASTOLIC BLOOD PRESSURE: 70 MMHG | TEMPERATURE: 98 F

## 2021-01-04 LAB
-  AMIKACIN: SIGNIFICANT CHANGE UP
-  AMOXICILLIN/CLAVULANIC ACID: SIGNIFICANT CHANGE UP
-  AMPICILLIN/SULBACTAM: SIGNIFICANT CHANGE UP
-  AMPICILLIN: SIGNIFICANT CHANGE UP
-  AZTREONAM: SIGNIFICANT CHANGE UP
-  CEFAZOLIN: SIGNIFICANT CHANGE UP
-  CEFEPIME: SIGNIFICANT CHANGE UP
-  CEFOXITIN: SIGNIFICANT CHANGE UP
-  CEFTRIAXONE: SIGNIFICANT CHANGE UP
-  CIPROFLOXACIN: SIGNIFICANT CHANGE UP
-  ERTAPENEM: SIGNIFICANT CHANGE UP
-  GENTAMICIN: SIGNIFICANT CHANGE UP
-  IMIPENEM: SIGNIFICANT CHANGE UP
-  LEVOFLOXACIN: SIGNIFICANT CHANGE UP
-  MEROPENEM: SIGNIFICANT CHANGE UP
-  NITROFURANTOIN: SIGNIFICANT CHANGE UP
-  PIPERACILLIN/TAZOBACTAM: SIGNIFICANT CHANGE UP
-  TIGECYCLINE: SIGNIFICANT CHANGE UP
-  TOBRAMYCIN: SIGNIFICANT CHANGE UP
-  TRIMETHOPRIM/SULFAMETHOXAZOLE: SIGNIFICANT CHANGE UP
ANION GAP SERPL CALC-SCNC: 11 MMOL/L — SIGNIFICANT CHANGE UP (ref 5–17)
BUN SERPL-MCNC: 17 MG/DL — SIGNIFICANT CHANGE UP (ref 8–20)
CALCIUM SERPL-MCNC: 9.4 MG/DL — SIGNIFICANT CHANGE UP (ref 8.6–10.2)
CHLORIDE SERPL-SCNC: 103 MMOL/L — SIGNIFICANT CHANGE UP (ref 98–107)
CO2 SERPL-SCNC: 23 MMOL/L — SIGNIFICANT CHANGE UP (ref 22–29)
CREAT SERPL-MCNC: 0.55 MG/DL — SIGNIFICANT CHANGE UP (ref 0.5–1.3)
CULTURE RESULTS: SIGNIFICANT CHANGE UP
GLUCOSE SERPL-MCNC: 115 MG/DL — HIGH (ref 70–99)
HCT VFR BLD CALC: 35.1 % — SIGNIFICANT CHANGE UP (ref 34.5–45)
HGB BLD-MCNC: 11.7 G/DL — SIGNIFICANT CHANGE UP (ref 11.5–15.5)
MCHC RBC-ENTMCNC: 28.7 PG — SIGNIFICANT CHANGE UP (ref 27–34)
MCHC RBC-ENTMCNC: 33.3 GM/DL — SIGNIFICANT CHANGE UP (ref 32–36)
MCV RBC AUTO: 86.2 FL — SIGNIFICANT CHANGE UP (ref 80–100)
METHOD TYPE: SIGNIFICANT CHANGE UP
ORGANISM # SPEC MICROSCOPIC CNT: SIGNIFICANT CHANGE UP
PLATELET # BLD AUTO: 144 K/UL — LOW (ref 150–400)
POTASSIUM SERPL-MCNC: 3.2 MMOL/L — LOW (ref 3.5–5.3)
POTASSIUM SERPL-SCNC: 3.2 MMOL/L — LOW (ref 3.5–5.3)
RBC # BLD: 4.07 M/UL — SIGNIFICANT CHANGE UP (ref 3.8–5.2)
RBC # FLD: 12.6 % — SIGNIFICANT CHANGE UP (ref 10.3–14.5)
SODIUM SERPL-SCNC: 137 MMOL/L — SIGNIFICANT CHANGE UP (ref 135–145)
SPECIMEN SOURCE: SIGNIFICANT CHANGE UP
WBC # BLD: 7.47 K/UL — SIGNIFICANT CHANGE UP (ref 3.8–10.5)
WBC # FLD AUTO: 7.47 K/UL — SIGNIFICANT CHANGE UP (ref 3.8–10.5)

## 2021-01-04 PROCEDURE — 84295 ASSAY OF SERUM SODIUM: CPT

## 2021-01-04 PROCEDURE — U0003: CPT

## 2021-01-04 PROCEDURE — 71045 X-RAY EXAM CHEST 1 VIEW: CPT

## 2021-01-04 PROCEDURE — 87086 URINE CULTURE/COLONY COUNT: CPT

## 2021-01-04 PROCEDURE — 99239 HOSP IP/OBS DSCHRG MGMT >30: CPT

## 2021-01-04 PROCEDURE — 93005 ELECTROCARDIOGRAM TRACING: CPT

## 2021-01-04 PROCEDURE — 87186 SC STD MICRODIL/AGAR DIL: CPT

## 2021-01-04 PROCEDURE — 99285 EMERGENCY DEPT VISIT HI MDM: CPT | Mod: 25

## 2021-01-04 PROCEDURE — 36415 COLL VENOUS BLD VENIPUNCTURE: CPT

## 2021-01-04 PROCEDURE — 80048 BASIC METABOLIC PNL TOTAL CA: CPT

## 2021-01-04 PROCEDURE — 85730 THROMBOPLASTIN TIME PARTIAL: CPT

## 2021-01-04 PROCEDURE — 84484 ASSAY OF TROPONIN QUANT: CPT

## 2021-01-04 PROCEDURE — 85025 COMPLETE CBC W/AUTO DIFF WBC: CPT

## 2021-01-04 PROCEDURE — 83735 ASSAY OF MAGNESIUM: CPT

## 2021-01-04 PROCEDURE — 80053 COMPREHEN METABOLIC PANEL: CPT

## 2021-01-04 PROCEDURE — 85610 PROTHROMBIN TIME: CPT

## 2021-01-04 PROCEDURE — 51701 INSERT BLADDER CATHETER: CPT

## 2021-01-04 PROCEDURE — 85014 HEMATOCRIT: CPT

## 2021-01-04 PROCEDURE — 83605 ASSAY OF LACTIC ACID: CPT

## 2021-01-04 PROCEDURE — 85027 COMPLETE CBC AUTOMATED: CPT

## 2021-01-04 PROCEDURE — 86769 SARS-COV-2 COVID-19 ANTIBODY: CPT

## 2021-01-04 PROCEDURE — 84132 ASSAY OF SERUM POTASSIUM: CPT

## 2021-01-04 PROCEDURE — 82803 BLOOD GASES ANY COMBINATION: CPT

## 2021-01-04 PROCEDURE — 82947 ASSAY GLUCOSE BLOOD QUANT: CPT

## 2021-01-04 PROCEDURE — 87040 BLOOD CULTURE FOR BACTERIA: CPT

## 2021-01-04 PROCEDURE — 82435 ASSAY OF BLOOD CHLORIDE: CPT

## 2021-01-04 PROCEDURE — 81001 URINALYSIS AUTO W/SCOPE: CPT

## 2021-01-04 PROCEDURE — 82330 ASSAY OF CALCIUM: CPT

## 2021-01-04 PROCEDURE — 73502 X-RAY EXAM HIP UNI 2-3 VIEWS: CPT

## 2021-01-04 PROCEDURE — 85018 HEMOGLOBIN: CPT

## 2021-01-04 RX ORDER — POTASSIUM CHLORIDE 20 MEQ
40 PACKET (EA) ORAL ONCE
Refills: 0 | Status: COMPLETED | OUTPATIENT
Start: 2021-01-04 | End: 2021-01-04

## 2021-01-04 RX ADMIN — Medication 40 MILLIEQUIVALENT(S): at 15:26

## 2021-01-04 RX ADMIN — CARBIDOPA AND LEVODOPA 1 TABLET(S): 25; 100 TABLET ORAL at 11:57

## 2021-01-04 RX ADMIN — Medication 250 MILLIGRAM(S): at 06:33

## 2021-01-04 RX ADMIN — DONEPEZIL HYDROCHLORIDE 10 MILLIGRAM(S): 10 TABLET, FILM COATED ORAL at 11:55

## 2021-01-04 RX ADMIN — ENOXAPARIN SODIUM 40 MILLIGRAM(S): 100 INJECTION SUBCUTANEOUS at 11:56

## 2021-01-04 RX ADMIN — CARBIDOPA AND LEVODOPA 1 TABLET(S): 25; 100 TABLET ORAL at 06:33

## 2021-01-04 RX ADMIN — Medication 81 MILLIGRAM(S): at 11:56

## 2021-01-04 RX ADMIN — MEMANTINE HYDROCHLORIDE 10 MILLIGRAM(S): 10 TABLET ORAL at 06:33

## 2021-01-04 RX ADMIN — Medication 325 MILLIGRAM(S): at 11:56

## 2021-01-04 RX ADMIN — SERTRALINE 100 MILLIGRAM(S): 25 TABLET, FILM COATED ORAL at 11:56

## 2021-01-04 NOTE — DISCHARGE NOTE PROVIDER - CARE PROVIDER_API CALL
KEYSHA COLON  Infectious Diseases  [address] [place] [address]  Phone: [phone]  Fax: [phone]  Follow Up Time: 1 week

## 2021-01-04 NOTE — DISCHARGE NOTE PROVIDER - NSDCMRMEDTOKEN_GEN_ALL_CORE_FT
acetaminophen 325 mg oral tablet: 3 tab(s) orally every 8 hours  acetaminophen 325 mg oral tablet: 2 tab(s) orally every 6 hours, As needed, Temp greater or equal to 38C (100.4F), Mild Pain (1 - 3)  aspirin 81 mg oral delayed release tablet: 1 tab(s) orally once a day  Can resume on 10/12   carbidopa-levodopa 25 mg-100 mg oral tablet: 1 tab(s) orally 3 times a day  donepezil 5 mg oral tablet: 1 tab(s) orally 2 times a day  melatonin 3 mg oral tablet: 1 tab(s) orally once a day (at bedtime)  memantine 10 mg oral tablet: 1 tab(s) orally 2 times a day  rosuvastatin 5 mg oral tablet: 1 tab(s) orally once a day  sertraline 100 mg oral tablet: 1 tab(s) orally once a day  Vitamin D3 2000 intl units (50 mcg) oral capsule: 1 cap(s) orally 3 times a week

## 2021-01-04 NOTE — DISCHARGE NOTE PROVIDER - NSDCCPCAREPLAN_GEN_ALL_CORE_FT
PRINCIPAL DISCHARGE DIAGNOSIS  Diagnosis: Acute cystitis without hematuria  Assessment and Plan of Treatment: Completed abx      SECONDARY DISCHARGE DIAGNOSES  Diagnosis: Aortic stenosis  Assessment and Plan of Treatment: Resume home meds    Diagnosis: Alzheimer disease  Assessment and Plan of Treatment: Resume home meds    Diagnosis: Metabolic encephalopathy  Assessment and Plan of Treatment: Resolved

## 2021-01-04 NOTE — DISCHARGE NOTE PROVIDER - HOSPITAL COURSE
84 yo F with hx of Dementia a&ox1-2 at baseline, lives with 24/7 aide was brought to the ED for increased lethargy. In the ED patient found to have positive UA and started on ceftriaxone for UTI. Completed abx. Now medically stable for discharge home. Seen by PT, recommends 24/7 assist. Confirmed plan with daughter. CCM to reinstate aides.     Vital Signs Last 24 Hrs  T(C): 36.6 (04 Jan 2021 08:44), Max: 36.6 (04 Jan 2021 08:44)  T(F): 97.9 (04 Jan 2021 08:44), Max: 97.9 (04 Jan 2021 08:44)  HR: 68 (04 Jan 2021 08:44) (68 - 84)  BP: 110/70 (04 Jan 2021 08:44) (110/70 - 150/55)  BP(mean): --  RR: 18 (04 Jan 2021 08:44) (18 - 18)  SpO2: 96% (04 Jan 2021 08:44) (96% - 98%)    Physical Exam:  Constitutional: alert, in no acute distress  Neck: Soft and supple  Respiratory: Clear to auscultation bilaterally, no wheezes or crackles  Cardiovascular: Regular rate and rhythm no murmurs, gallops, rubs  Gastrointestinal: Soft, non-tender to palpation, +bs  Vascular: 2+ peripheral pulses  Neurological: A/O x 0-1, no focal neurological deficits  Musculoskeletal:  no lower extremity edema bilaterally 84 yo F with hx of Dementia a&ox1-2 at baseline, lives with 24/7 aide was brought to the ED for increased lethargy. In the ED patient found to have positive UA and started on ceftriaxone for UTI. Completed abx. Now medically stable for discharge home. Seen by PT, recommends 24/7 assist. Confirmed plan with daughter. CCM to reinstate aides.     Vital Signs Last 24 Hrs  T(C): 36.6 (04 Jan 2021 08:44), Max: 36.6 (04 Jan 2021 08:44)  T(F): 97.9 (04 Jan 2021 08:44), Max: 97.9 (04 Jan 2021 08:44)  HR: 68 (04 Jan 2021 08:44) (68 - 84)  BP: 110/70 (04 Jan 2021 08:44) (110/70 - 150/55)  BP(mean): --  RR: 18 (04 Jan 2021 08:44) (18 - 18)  SpO2: 96% (04 Jan 2021 08:44) (96% - 98%)    Physical Exam:  Constitutional: alert, in no acute distress  Neck: Soft and supple  Respiratory: Clear to auscultation bilaterally, no wheezes or crackles  Cardiovascular: Regular rate and rhythm no murmurs, gallops, rubs  Gastrointestinal: Soft, non-tender to palpation, +bs  Vascular: 2+ peripheral pulses  Neurological: A/O x 0-1, no focal neurological deficits  Musculoskeletal:  no lower extremity edema bilaterally     called daughter  cira and updated of plan of care. time spent for this dc 45 mins

## 2021-01-04 NOTE — DISCHARGE NOTE NURSING/CASE MANAGEMENT/SOCIAL WORK - PATIENT PORTAL LINK FT
You can access the FollowMyHealth Patient Portal offered by [facility] by registering at the following website: [email or web address]. By joining [employer]’s FollowMyHealth portal, you will also be able to view your health information using other applications (apps) compatible with our system.

## 2021-01-05 LAB
CULTURE RESULTS: SIGNIFICANT CHANGE UP
CULTURE RESULTS: SIGNIFICANT CHANGE UP
SPECIMEN SOURCE: SIGNIFICANT CHANGE UP
SPECIMEN SOURCE: SIGNIFICANT CHANGE UP

## 2021-01-18 ENCOUNTER — APPOINTMENT (OUTPATIENT)
Dept: INTERNAL MEDICINE | Facility: CLINIC | Age: 86
End: 2021-01-18

## 2021-01-18 DIAGNOSIS — N39.0 URINARY TRACT INFECTION, SITE NOT SPECIFIED: ICD-10-CM

## 2021-04-01 DIAGNOSIS — S72.001A FRACTURE OF UNSPECIFIED PART OF NECK OF RIGHT FEMUR, INITIAL ENCOUNTER FOR CLOSED FRACTURE: ICD-10-CM

## 2021-04-02 ENCOUNTER — APPOINTMENT (OUTPATIENT)
Dept: CT IMAGING | Facility: CLINIC | Age: 86
End: 2021-04-02
Payer: MEDICARE

## 2021-04-02 ENCOUNTER — OUTPATIENT (OUTPATIENT)
Dept: OUTPATIENT SERVICES | Facility: HOSPITAL | Age: 86
LOS: 1 days | End: 2021-04-02
Payer: MEDICARE

## 2021-04-02 ENCOUNTER — RESULT REVIEW (OUTPATIENT)
Age: 86
End: 2021-04-02

## 2021-04-02 DIAGNOSIS — S72.001A FRACTURE OF UNSPECIFIED PART OF NECK OF RIGHT FEMUR, INITIAL ENCOUNTER FOR CLOSED FRACTURE: ICD-10-CM

## 2021-04-02 PROCEDURE — 73700 CT LOWER EXTREMITY W/O DYE: CPT | Mod: 26,RT,MH

## 2021-04-02 PROCEDURE — 76376 3D RENDER W/INTRP POSTPROCES: CPT | Mod: 26

## 2021-04-02 PROCEDURE — 73700 CT LOWER EXTREMITY W/O DYE: CPT

## 2021-04-02 PROCEDURE — 76376 3D RENDER W/INTRP POSTPROCES: CPT

## 2021-04-03 ENCOUNTER — APPOINTMENT (OUTPATIENT)
Dept: ORTHOPEDIC SURGERY | Facility: CLINIC | Age: 86
End: 2021-04-03
Payer: MEDICARE

## 2021-04-03 DIAGNOSIS — Z96.649 PERIPROSTHETIC FRACTURE AROUND OTHER INTERNAL PROSTHETIC JOINT, INITIAL ENCOUNTER: ICD-10-CM

## 2021-04-03 DIAGNOSIS — M97.8XXA PERIPROSTHETIC FRACTURE AROUND OTHER INTERNAL PROSTHETIC JOINT, INITIAL ENCOUNTER: ICD-10-CM

## 2021-04-03 PROCEDURE — 27238 TREAT THIGH FRACTURE: CPT | Mod: RT

## 2021-04-03 PROCEDURE — 99214 OFFICE O/P EST MOD 30 MIN: CPT | Mod: 57

## 2021-04-03 NOTE — REASON FOR VISIT
[Follow-Up Visit] : a follow-up visit for [Femur Fracture] : femur fracture [Family Member] : family member

## 2021-04-03 NOTE — PHYSICAL EXAM
[de-identified] : Physical exam today performed via telemedicine with the patient's daughter Chey providing the details.\par \par Physical Exam:\par General: Well appearing, no acute distress\par Neurologic: No focal deficits\par Head: NCAT without abrasions, lacerations, or ecchymosis to head, face, or scalp\par Respiratory: Equal chest wall expansion bilaterally, no accessory muscle use\par Lymphatic: Right lower extremity swelling\par Skin: Warm and dry\par Psychiatric: Normal mood and affect\par \par Positive pain with range of motion of the right hip\par Reports SILT s/s/sp/dp/t\par Fires EHL/FHL/GS/TA\par Skin appears well-perfused\par Positive bruising and swelling noted right lower extremity [de-identified] : Plain films were previously done earlier this week and a CT scan was done yesterday.  There is a displaced intertrochanteric type periprosthetic fracture with displacement of the medial fragment consisting of the lesser trochanter.  The fracture line extends towards the greater trochanter but the lateral aspect of the proximal femur appears well fixed to the prosthesis.  The distal third is completely surrounded by intact cortical bone and the entire lateral aspect of the prosthesis has bony contact.

## 2021-04-03 NOTE — HISTORY OF PRESENT ILLNESS
[de-identified] : The patient is an 86-year-old female who presents via telehealth today with her daughter for evaluation of right hip pain.  She is well-known to the office.  She had a hemiarthroplasty done in the past and had been doing well but had a new fall this last Tuesday about 4 days ago.  She had increased difficulty with attempted ambulation at that time.  Portable x-rays were obtained by her primary care physician which showed a displaced intertrochanteric type hip fracture around a hemiarthroplasty prosthesis.  A repeat CAT scan was done yesterday for further evaluation.  The patient's daughter provides the majority of the appointment information today as the patient has some underlying medical issues and is not as able to help with explanation of her current injury.  The patient states the pain is made worse with activity and relieved with rest.  Aching, 4 out of 10 she has trouble getting out of bed at the present time without significant assistance.\par \par The patient and her daughter are at their home and I am working remotely via a [employer] secure laptop.  Verbal consent was obtained today from the patient's daughter who is her healthcare proxy for the telemedicine visit today. [Bending] : worsened by bending [Lifting] : worsened by lifting [Weight Bearing] : worsened by weight bearing [Recumbency] : relieved by recumbency [Rest] : relieved by rest

## 2021-04-03 NOTE — DISCUSSION/SUMMARY
[de-identified] : 86-year-old female with right periprosthetic intertrochanteric type fracture.  We discussed that the prosthesis appears to be stable given the amount of bone that is in contact with the prosthesis and in particular the distal tip surrounded by intact cortical bone.  We discussed that she may likely have pain for some time as the fracture heals.  The swelling and the bruising is quite common given the type of fracture that has occurred.  We discussed this is a well vascularized part of bone and it tends to bruise well but because of its significant blood supply also tends to heal well.  I would allow for protected weightbearing as tolerated.  She may need some significant assistance to help rehab at home so we will order home physical therapy and hopefully will be approved.  Chey, the daughter, and I discussed at length the options for her mother's injury today.  We discussed that hopefully nonoperative management will be successful with protected weightbearing and observation but if the pain worsens or does not improve in a reasonable amount of time, we can discuss whether revision arthroplasty is an option we want to pursue further.  She will likely give us a call in a week or 2 with an update and we can figure out how to proceed from there.\par \par Osteopenia and osteoporosis are significant risk factors for fragility fractures. Given the type of fracture that has occurred, I would highly recommend that the patient followup with their primary care physician to discuss treatment for low bone mineral density. We discussed the benefits of these medications frequently far outweigh the small risks. Their primary care physician can call the office with any specific questions or concerns.\par \par The patient was given the opportunity to ask questions and all questions were answered to their satisfaction.\par \par Lincoln Mojica MD\par Orthopaedic Trauma Surgeon\par [facility]\par [facility] [place]\par Director Orthopaedic Trauma, [facility]\par \par \par \par

## 2021-04-05 PROBLEM — M97.8XXA: Status: ACTIVE | Noted: 2021-04-03

## 2021-04-16 DIAGNOSIS — L98.9 DISORDER OF THE SKIN AND SUBCUTANEOUS TISSUE, UNSPECIFIED: ICD-10-CM

## 2021-04-16 DIAGNOSIS — L89.159 PRESSURE ULCER OF SACRAL REGION, UNSPECIFIED STAGE: ICD-10-CM

## 2021-07-29 ENCOUNTER — RX RENEWAL (OUTPATIENT)
Age: 86
End: 2021-07-29

## 2021-09-27 ENCOUNTER — APPOINTMENT (OUTPATIENT)
Dept: INTERNAL MEDICINE | Facility: CLINIC | Age: 86
End: 2021-09-27
Payer: MEDICARE

## 2021-09-27 ENCOUNTER — RX RENEWAL (OUTPATIENT)
Age: 86
End: 2021-09-27

## 2021-09-27 DIAGNOSIS — F03.90 UNSPECIFIED DEMENTIA W/OUT BEHAVIORAL DISTURBANCE: ICD-10-CM

## 2021-09-27 DIAGNOSIS — R53.83 OTHER FATIGUE: ICD-10-CM

## 2021-09-27 DIAGNOSIS — S72.141A DISPLACED INTERTROCHANTERIC FRACTURE OF RIGHT FEMUR, INITIAL ENCOUNTER FOR CLOSED FRACTURE: ICD-10-CM

## 2021-09-27 PROCEDURE — 99442: CPT | Mod: 95

## 2022-01-05 ENCOUNTER — RX RENEWAL (OUTPATIENT)
Age: 87
End: 2022-01-05

## 2022-02-04 NOTE — PATIENT PROFILE ADULT - FLU SEASON?
Patient Education     4 Steps for Eating Healthier  Changing the way you eat can improve your health. It can lower your cholesterol and blood pressure, and help you stay at a healthy weight. Your diet doesn’t have to be bland and boring to be healthy. Just watch your calories and follow these steps:    Step 1. Eat fewer unhealthy fats  · Choose more fish and lean meats instead of fatty cuts of meat.  · Skip butter and lard, and use less margarine.  · Pass on foods that have palm, coconut, or hydrogenated oils.  · Eat fewer high-fat dairy foods like cheese, ice cream, and whole milk.  · Get a heart-healthy cookbook and try some low-fat recipes.  Step 2. Go light on salt  · Keep the saltshaker off the table.  · Limit high-salt ingredients, such as soy sauce, bouillon, and garlic salt.  · Instead of adding salt when cooking, season your food with herbs and flavorings. Try lemon, garlic, and onion, or salt-free herb seasonings.  · Limit convenience foods, such as boxed or canned foods and restaurant food.  · Read food labels and choose lower-sodium options.  Step 3. Limit sugar  · Pause before you add sugars to pancakes, cereal, coffee, or tea. This includes white and brown table sugar, syrup, honey, and molasses. Cut your usual amount by half.  · Use non-sugar sweeteners. Stevia, aspartame, and sucralose can satisfy a sweet tooth without adding calories.  · Swap out sugar-filled soda and other drinks. Buy sugar-free or low-calorie beverages. Remember water is always the best choice.  · Read labels and choose foods with less added sugar. Keep in mind that dairy foods and foods with fruit will have some natural sugar.  · Cut the sugar in recipes by 1/3 to 1/2. Boost the flavor with extracts like almond, vanilla, or orange. Or add spices such as cinnamon or nutmeg.  Step 4. Eat more fiber  · Eat fresh fruits and vegetables every day.  · Boost your diet with whole grains. Go for oats, whole-grain rice, and bran.  · Add  beans and lentils to your meals.  · Drink more water to match your fiber increase to help prevent constipation.  Date Last Reviewed: 6/1/2017  © 8652-5913 [employer] [place]. [address] [place] [address], [place] [address]. All rights reserved. This information is not intended as a substitute for professional medical care. Always follow your healthcare professional's instructions.            Yes...

## 2022-03-17 RX ORDER — CEPHALEXIN 500 MG/1
500 CAPSULE ORAL 3 TIMES DAILY
Qty: 15 | Refills: 0 | Status: ACTIVE | COMMUNITY
Start: 2022-03-17 | End: 1900-01-01

## 2022-04-06 DIAGNOSIS — Z11.1 ENCOUNTER FOR SCREENING FOR RESPIRATORY TUBERCULOSIS: ICD-10-CM

## 2022-04-11 RX ORDER — AMLODIPINE BESYLATE 5 MG/1
5 TABLET ORAL
Qty: 90 | Refills: 3 | Status: DISCONTINUED | OUTPATIENT
End: 2022-04-11

## 2022-04-13 RX ORDER — MEMANTINE HYDROCHLORIDE 10 MG/1
10 TABLET, FILM COATED ORAL TWICE DAILY
Qty: 60 | Refills: 0 | Status: ACTIVE | OUTPATIENT
Start: 2018-04-18

## 2022-04-13 RX ORDER — DONEPEZIL HYDROCHLORIDE 5 MG/1
5 TABLET ORAL
Qty: 90 | Refills: 0 | Status: ACTIVE | OUTPATIENT

## 2022-04-13 RX ORDER — METHENAMINE HIPPURATE 1 G/1
1 TABLET ORAL
Qty: 180 | Refills: 2 | Status: ACTIVE | OUTPATIENT
Start: 2021-01-18

## 2022-04-13 RX ORDER — ASPIRIN ENTERIC COATED TABLETS 81 MG 81 MG/1
81 TABLET, DELAYED RELEASE ORAL DAILY
Qty: 90 | Refills: 0 | Status: ACTIVE | OUTPATIENT
Start: 2022-04-13

## 2022-04-13 RX ORDER — WALKER
EACH MISCELLANEOUS
Qty: 1 | Refills: 0 | Status: ACTIVE | OUTPATIENT
Start: 2019-09-06

## 2022-04-13 RX ORDER — ROSUVASTATIN CALCIUM 5 MG/1
5 TABLET, FILM COATED ORAL
Qty: 90 | Refills: 3 | Status: ACTIVE | OUTPATIENT
Start: 2020-01-09

## 2022-04-13 RX ORDER — ASPIRIN 81 MG
81 TABLET, DELAYED RELEASE (ENTERIC COATED) ORAL DAILY
Refills: 0 | Status: DISCONTINUED | COMMUNITY
End: 2022-04-13

## 2022-04-13 RX ORDER — SERTRALINE HYDROCHLORIDE 100 MG/1
100 TABLET, FILM COATED ORAL
Qty: 90 | Refills: 3 | Status: ACTIVE | OUTPATIENT
Start: 2018-08-03

## 2022-04-13 RX ORDER — CALCIUM CARBONATE 160(400)MG
TABLET,CHEWABLE ORAL
Qty: 1 | Refills: 0 | Status: ACTIVE | OUTPATIENT
Start: 2019-08-29

## 2022-04-13 RX ORDER — SILVER SULFADIAZINE 10 MG/G
1 CREAM TOPICAL TWICE DAILY
Qty: 1 | Refills: 2 | Status: ACTIVE | OUTPATIENT
Start: 2021-04-16

## 2022-06-20 ENCOUNTER — RX RENEWAL (OUTPATIENT)
Age: 87
End: 2022-06-20

## 2022-06-20 RX ORDER — SERTRALINE HYDROCHLORIDE 100 MG/1
100 TABLET, FILM COATED ORAL
Qty: 90 | Refills: 1 | Status: ACTIVE | COMMUNITY
Start: 2019-12-24 | End: 1900-01-01

## 2022-07-09 ENCOUNTER — INPATIENT (INPATIENT)
Facility: HOSPITAL | Age: 87
LOS: 4 days | Discharge: HOSPICE | DRG: 871 | End: 2022-07-14
Attending: INTERNAL MEDICINE | Admitting: INTERNAL MEDICINE
Payer: MEDICARE

## 2022-07-09 VITALS
TEMPERATURE: 98 F | OXYGEN SATURATION: 71 % | WEIGHT: 160.06 LBS | HEART RATE: 86 BPM | RESPIRATION RATE: 48 BRPM | SYSTOLIC BLOOD PRESSURE: 170 MMHG | DIASTOLIC BLOOD PRESSURE: 64 MMHG | HEIGHT: 66 IN

## 2022-07-09 DIAGNOSIS — J69.0 PNEUMONITIS DUE TO INHALATION OF FOOD AND VOMIT: ICD-10-CM

## 2022-07-09 DIAGNOSIS — N39.0 URINARY TRACT INFECTION, SITE NOT SPECIFIED: ICD-10-CM

## 2022-07-09 DIAGNOSIS — Z29.9 ENCOUNTER FOR PROPHYLACTIC MEASURES, UNSPECIFIED: ICD-10-CM

## 2022-07-09 DIAGNOSIS — J96.01 ACUTE RESPIRATORY FAILURE WITH HYPOXIA: ICD-10-CM

## 2022-07-09 DIAGNOSIS — A41.9 SEPSIS, UNSPECIFIED ORGANISM: ICD-10-CM

## 2022-07-09 DIAGNOSIS — E86.0 DEHYDRATION: ICD-10-CM

## 2022-07-09 DIAGNOSIS — U07.1 COVID-19: ICD-10-CM

## 2022-07-09 DIAGNOSIS — R73.9 HYPERGLYCEMIA, UNSPECIFIED: ICD-10-CM

## 2022-07-09 LAB
ALBUMIN SERPL ELPH-MCNC: 3.2 G/DL — LOW (ref 3.3–5)
ALP SERPL-CCNC: 111 U/L — SIGNIFICANT CHANGE UP (ref 30–120)
ALT FLD-CCNC: 13 U/L DA — SIGNIFICANT CHANGE UP (ref 10–60)
ANION GAP SERPL CALC-SCNC: 9 MMOL/L — SIGNIFICANT CHANGE UP (ref 5–17)
APPEARANCE UR: CLEAR — SIGNIFICANT CHANGE UP
APTT BLD: 23.5 SEC — LOW (ref 27.5–35.5)
AST SERPL-CCNC: 23 U/L — SIGNIFICANT CHANGE UP (ref 10–40)
BACTERIA # UR AUTO: ABNORMAL
BASE EXCESS BLDA CALC-SCNC: -2.8 MMOL/L — LOW (ref -2–3)
BASOPHILS # BLD AUTO: 0.06 K/UL — SIGNIFICANT CHANGE UP (ref 0–0.2)
BASOPHILS NFR BLD AUTO: 0.4 % — SIGNIFICANT CHANGE UP (ref 0–2)
BILIRUB SERPL-MCNC: 1 MG/DL — SIGNIFICANT CHANGE UP (ref 0.2–1.2)
BILIRUB UR-MCNC: NEGATIVE — SIGNIFICANT CHANGE UP
BUN SERPL-MCNC: 21 MG/DL — SIGNIFICANT CHANGE UP (ref 7–23)
CALCIUM SERPL-MCNC: 9.1 MG/DL — SIGNIFICANT CHANGE UP (ref 8.4–10.5)
CHLORIDE SERPL-SCNC: 112 MMOL/L — HIGH (ref 96–108)
CO2 SERPL-SCNC: 26 MMOL/L — SIGNIFICANT CHANGE UP (ref 22–31)
COLOR SPEC: YELLOW — SIGNIFICANT CHANGE UP
CREAT SERPL-MCNC: 0.83 MG/DL — SIGNIFICANT CHANGE UP (ref 0.5–1.3)
DIFF PNL FLD: ABNORMAL
EGFR: 69 ML/MIN/1.73M2 — SIGNIFICANT CHANGE UP
EOSINOPHIL # BLD AUTO: 0.09 K/UL — SIGNIFICANT CHANGE UP (ref 0–0.5)
EOSINOPHIL NFR BLD AUTO: 0.7 % — SIGNIFICANT CHANGE UP (ref 0–6)
EPI CELLS # UR: SIGNIFICANT CHANGE UP
GLUCOSE SERPL-MCNC: 215 MG/DL — HIGH (ref 70–99)
GLUCOSE UR QL: 250 MG/DL
HCO3 BLDA-SCNC: 22 MMOL/L — SIGNIFICANT CHANGE UP (ref 21–28)
HCT VFR BLD CALC: 38.9 % — SIGNIFICANT CHANGE UP (ref 34.5–45)
HGB BLD-MCNC: 12.4 G/DL — SIGNIFICANT CHANGE UP (ref 11.5–15.5)
HOROWITZ INDEX BLDA+IHG-RTO: 100 — SIGNIFICANT CHANGE UP
IMM GRANULOCYTES NFR BLD AUTO: 0.8 % — SIGNIFICANT CHANGE UP (ref 0–1.5)
INR BLD: 1.09 RATIO — SIGNIFICANT CHANGE UP (ref 0.88–1.16)
KETONES UR-MCNC: NEGATIVE — SIGNIFICANT CHANGE UP
LACTATE SERPL-SCNC: 2.1 MMOL/L — HIGH (ref 0.7–2)
LACTATE SERPL-SCNC: 2.3 MMOL/L — HIGH (ref 0.7–2)
LACTATE SERPL-SCNC: 2.6 MMOL/L — HIGH (ref 0.7–2)
LEUKOCYTE ESTERASE UR-ACNC: ABNORMAL
LYMPHOCYTES # BLD AUTO: 15.4 % — SIGNIFICANT CHANGE UP (ref 13–44)
LYMPHOCYTES # BLD AUTO: 2.1 K/UL — SIGNIFICANT CHANGE UP (ref 1–3.3)
MCHC RBC-ENTMCNC: 27.9 PG — SIGNIFICANT CHANGE UP (ref 27–34)
MCHC RBC-ENTMCNC: 31.9 GM/DL — LOW (ref 32–36)
MCV RBC AUTO: 87.6 FL — SIGNIFICANT CHANGE UP (ref 80–100)
MONOCYTES # BLD AUTO: 0.32 K/UL — SIGNIFICANT CHANGE UP (ref 0–0.9)
MONOCYTES NFR BLD AUTO: 2.3 % — SIGNIFICANT CHANGE UP (ref 2–14)
NEUTROPHILS # BLD AUTO: 10.95 K/UL — HIGH (ref 1.8–7.4)
NEUTROPHILS NFR BLD AUTO: 80.4 % — HIGH (ref 43–77)
NITRITE UR-MCNC: NEGATIVE — SIGNIFICANT CHANGE UP
NRBC # BLD: 0 /100 WBCS — SIGNIFICANT CHANGE UP (ref 0–0)
PCO2 BLDA: 39 MMHG — HIGH (ref 32–35)
PH BLDA: 7.37 — SIGNIFICANT CHANGE UP (ref 7.35–7.45)
PH UR: 5 — SIGNIFICANT CHANGE UP (ref 5–8)
PLATELET # BLD AUTO: 213 K/UL — SIGNIFICANT CHANGE UP (ref 150–400)
PO2 BLDA: 108 MMHG — SIGNIFICANT CHANGE UP (ref 83–108)
POTASSIUM SERPL-MCNC: 3.7 MMOL/L — SIGNIFICANT CHANGE UP (ref 3.5–5.3)
POTASSIUM SERPL-SCNC: 3.7 MMOL/L — SIGNIFICANT CHANGE UP (ref 3.5–5.3)
PROT SERPL-MCNC: 7 G/DL — SIGNIFICANT CHANGE UP (ref 6–8.3)
PROT UR-MCNC: 100 MG/DL
PROTHROM AB SERPL-ACNC: 12.6 SEC — SIGNIFICANT CHANGE UP (ref 10.5–13.4)
RBC # BLD: 4.44 M/UL — SIGNIFICANT CHANGE UP (ref 3.8–5.2)
RBC # FLD: 13.4 % — SIGNIFICANT CHANGE UP (ref 10.3–14.5)
RBC CASTS # UR COMP ASSIST: ABNORMAL /HPF (ref 0–4)
SAO2 % BLDA: 99.1 % — HIGH (ref 94–98)
SARS-COV-2 RNA SPEC QL NAA+PROBE: DETECTED
SODIUM SERPL-SCNC: 147 MMOL/L — HIGH (ref 135–145)
SP GR SPEC: 1.03 — HIGH (ref 1.01–1.02)
UROBILINOGEN FLD QL: NEGATIVE MG/DL — SIGNIFICANT CHANGE UP
WBC # BLD: 13.63 K/UL — HIGH (ref 3.8–10.5)
WBC # FLD AUTO: 13.63 K/UL — HIGH (ref 3.8–10.5)
WBC UR QL: ABNORMAL

## 2022-07-09 PROCEDURE — 71275 CT ANGIOGRAPHY CHEST: CPT | Mod: 26,MA

## 2022-07-09 PROCEDURE — 93010 ELECTROCARDIOGRAM REPORT: CPT

## 2022-07-09 PROCEDURE — 99285 EMERGENCY DEPT VISIT HI MDM: CPT | Mod: FS,CS

## 2022-07-09 PROCEDURE — 70450 CT HEAD/BRAIN W/O DYE: CPT | Mod: 26,MA

## 2022-07-09 PROCEDURE — 71045 X-RAY EXAM CHEST 1 VIEW: CPT | Mod: 26

## 2022-07-09 PROCEDURE — 99223 1ST HOSP IP/OBS HIGH 75: CPT | Mod: AI

## 2022-07-09 RX ORDER — PIPERACILLIN AND TAZOBACTAM 4; .5 G/20ML; G/20ML
3.38 INJECTION, POWDER, LYOPHILIZED, FOR SOLUTION INTRAVENOUS ONCE
Refills: 0 | Status: COMPLETED | OUTPATIENT
Start: 2022-07-09 | End: 2022-07-09

## 2022-07-09 RX ORDER — REMDESIVIR 5 MG/ML
INJECTION INTRAVENOUS
Refills: 0 | Status: DISCONTINUED | OUTPATIENT
Start: 2022-07-09 | End: 2022-07-10

## 2022-07-09 RX ORDER — MEMANTINE HYDROCHLORIDE 10 MG/1
10 TABLET ORAL
Refills: 0 | Status: DISCONTINUED | OUTPATIENT
Start: 2022-07-10 | End: 2022-07-14

## 2022-07-09 RX ORDER — ATORVASTATIN CALCIUM 80 MG/1
20 TABLET, FILM COATED ORAL AT BEDTIME
Refills: 0 | Status: DISCONTINUED | OUTPATIENT
Start: 2022-07-10 | End: 2022-07-14

## 2022-07-09 RX ORDER — CARBIDOPA AND LEVODOPA 25; 100 MG/1; MG/1
1 TABLET ORAL THREE TIMES A DAY
Refills: 0 | Status: DISCONTINUED | OUTPATIENT
Start: 2022-07-10 | End: 2022-07-14

## 2022-07-09 RX ORDER — DEXAMETHASONE 0.5 MG/5ML
6 ELIXIR ORAL ONCE
Refills: 0 | Status: COMPLETED | OUTPATIENT
Start: 2022-07-09 | End: 2022-07-09

## 2022-07-09 RX ORDER — DEXTROSE 50 % IN WATER 50 %
25 SYRINGE (ML) INTRAVENOUS ONCE
Refills: 0 | Status: DISCONTINUED | OUTPATIENT
Start: 2022-07-09 | End: 2022-07-14

## 2022-07-09 RX ORDER — PIPERACILLIN AND TAZOBACTAM 4; .5 G/20ML; G/20ML
3.38 INJECTION, POWDER, LYOPHILIZED, FOR SOLUTION INTRAVENOUS EVERY 8 HOURS
Refills: 0 | Status: DISCONTINUED | OUTPATIENT
Start: 2022-07-09 | End: 2022-07-14

## 2022-07-09 RX ORDER — CEFTRIAXONE 500 MG/1
1000 INJECTION, POWDER, FOR SOLUTION INTRAMUSCULAR; INTRAVENOUS ONCE
Refills: 0 | Status: COMPLETED | OUTPATIENT
Start: 2022-07-09 | End: 2022-07-09

## 2022-07-09 RX ORDER — DEXAMETHASONE 0.5 MG/5ML
6 ELIXIR ORAL DAILY
Refills: 0 | Status: COMPLETED | OUTPATIENT
Start: 2022-07-10 | End: 2022-07-14

## 2022-07-09 RX ORDER — VANCOMYCIN HCL 1 G
1000 VIAL (EA) INTRAVENOUS ONCE
Refills: 0 | Status: COMPLETED | OUTPATIENT
Start: 2022-07-09 | End: 2022-07-09

## 2022-07-09 RX ORDER — SODIUM CHLORIDE 9 MG/ML
2300 INJECTION INTRAMUSCULAR; INTRAVENOUS; SUBCUTANEOUS ONCE
Refills: 0 | Status: COMPLETED | OUTPATIENT
Start: 2022-07-09 | End: 2022-07-09

## 2022-07-09 RX ORDER — GLUCAGON INJECTION, SOLUTION 0.5 MG/.1ML
1 INJECTION, SOLUTION SUBCUTANEOUS ONCE
Refills: 0 | Status: DISCONTINUED | OUTPATIENT
Start: 2022-07-09 | End: 2022-07-14

## 2022-07-09 RX ORDER — REMDESIVIR 5 MG/ML
200 INJECTION INTRAVENOUS EVERY 24 HOURS
Refills: 0 | Status: COMPLETED | OUTPATIENT
Start: 2022-07-09 | End: 2022-07-10

## 2022-07-09 RX ORDER — REMDESIVIR 5 MG/ML
100 INJECTION INTRAVENOUS EVERY 24 HOURS
Refills: 0 | Status: DISCONTINUED | OUTPATIENT
Start: 2022-07-09 | End: 2022-07-09

## 2022-07-09 RX ORDER — ASPIRIN/CALCIUM CARB/MAGNESIUM 324 MG
81 TABLET ORAL DAILY
Refills: 0 | Status: DISCONTINUED | OUTPATIENT
Start: 2022-07-09 | End: 2022-07-14

## 2022-07-09 RX ORDER — ENOXAPARIN SODIUM 100 MG/ML
40 INJECTION SUBCUTANEOUS EVERY 24 HOURS
Refills: 0 | Status: DISCONTINUED | OUTPATIENT
Start: 2022-07-09 | End: 2022-07-14

## 2022-07-09 RX ORDER — ONDANSETRON 8 MG/1
4 TABLET, FILM COATED ORAL EVERY 6 HOURS
Refills: 0 | Status: DISCONTINUED | OUTPATIENT
Start: 2022-07-09 | End: 2022-07-14

## 2022-07-09 RX ORDER — DONEPEZIL HYDROCHLORIDE 10 MG/1
5 TABLET, FILM COATED ORAL DAILY
Refills: 0 | Status: DISCONTINUED | OUTPATIENT
Start: 2022-07-10 | End: 2022-07-14

## 2022-07-09 RX ORDER — DEXTROSE 50 % IN WATER 50 %
12.5 SYRINGE (ML) INTRAVENOUS ONCE
Refills: 0 | Status: DISCONTINUED | OUTPATIENT
Start: 2022-07-09 | End: 2022-07-14

## 2022-07-09 RX ORDER — ACETAMINOPHEN 500 MG
650 TABLET ORAL EVERY 6 HOURS
Refills: 0 | Status: DISCONTINUED | OUTPATIENT
Start: 2022-07-09 | End: 2022-07-14

## 2022-07-09 RX ORDER — SODIUM CHLORIDE 9 MG/ML
1000 INJECTION, SOLUTION INTRAVENOUS
Refills: 0 | Status: DISCONTINUED | OUTPATIENT
Start: 2022-07-09 | End: 2022-07-14

## 2022-07-09 RX ORDER — LABETALOL HCL 100 MG
10 TABLET ORAL ONCE
Refills: 0 | Status: COMPLETED | OUTPATIENT
Start: 2022-07-09 | End: 2022-07-09

## 2022-07-09 RX ORDER — SERTRALINE 25 MG/1
100 TABLET, FILM COATED ORAL DAILY
Refills: 0 | Status: DISCONTINUED | OUTPATIENT
Start: 2022-07-10 | End: 2022-07-14

## 2022-07-09 RX ORDER — LANOLIN ALCOHOL/MO/W.PET/CERES
3 CREAM (GRAM) TOPICAL AT BEDTIME
Refills: 0 | Status: DISCONTINUED | OUTPATIENT
Start: 2022-07-09 | End: 2022-07-14

## 2022-07-09 RX ORDER — HYDRALAZINE HCL 50 MG
5 TABLET ORAL EVERY 6 HOURS
Refills: 0 | Status: DISCONTINUED | OUTPATIENT
Start: 2022-07-09 | End: 2022-07-14

## 2022-07-09 RX ORDER — INSULIN LISPRO 100/ML
VIAL (ML) SUBCUTANEOUS EVERY 6 HOURS
Refills: 0 | Status: DISCONTINUED | OUTPATIENT
Start: 2022-07-09 | End: 2022-07-11

## 2022-07-09 RX ORDER — DEXTROSE 50 % IN WATER 50 %
15 SYRINGE (ML) INTRAVENOUS ONCE
Refills: 0 | Status: DISCONTINUED | OUTPATIENT
Start: 2022-07-09 | End: 2022-07-14

## 2022-07-09 RX ADMIN — Medication 10 MILLIGRAM(S): at 19:14

## 2022-07-09 RX ADMIN — SODIUM CHLORIDE 2300 MILLILITER(S): 9 INJECTION INTRAMUSCULAR; INTRAVENOUS; SUBCUTANEOUS at 16:10

## 2022-07-09 RX ADMIN — PIPERACILLIN AND TAZOBACTAM 200 GRAM(S): 4; .5 INJECTION, POWDER, LYOPHILIZED, FOR SOLUTION INTRAVENOUS at 18:32

## 2022-07-09 RX ADMIN — CEFTRIAXONE 1000 MILLIGRAM(S): 500 INJECTION, POWDER, FOR SOLUTION INTRAMUSCULAR; INTRAVENOUS at 19:02

## 2022-07-09 RX ADMIN — CEFTRIAXONE 100 MILLIGRAM(S): 500 INJECTION, POWDER, FOR SOLUTION INTRAMUSCULAR; INTRAVENOUS at 18:32

## 2022-07-09 RX ADMIN — Medication 250 MILLIGRAM(S): at 18:33

## 2022-07-09 RX ADMIN — PIPERACILLIN AND TAZOBACTAM 3.38 GRAM(S): 4; .5 INJECTION, POWDER, LYOPHILIZED, FOR SOLUTION INTRAVENOUS at 19:54

## 2022-07-09 RX ADMIN — Medication 1000 MILLIGRAM(S): at 20:54

## 2022-07-09 RX ADMIN — SODIUM CHLORIDE 2300 MILLILITER(S): 9 INJECTION INTRAMUSCULAR; INTRAVENOUS; SUBCUTANEOUS at 19:00

## 2022-07-09 RX ADMIN — Medication 6 MILLIGRAM(S): at 18:44

## 2022-07-09 NOTE — CONSULT NOTE ADULT - ASSESSMENT
86 yo f pmhx parkinson's disease, dementia, HTN, HLD, recent diagnosis with COVID 19 admitted with     1. Hypoxic Respiratory Failure  2. COVID 19  3. Aspiration Pneumonia  4. UTI    NEURO:  CV:  RESP:  RENAL: Monitor lytes, replace lytes.   GI: NPO while in setting of respiratory failure  ENDO: ISS for glycemic control   ID: COVID 19, asp pna, UTI on received vanco x1. zosyn, remdesivir and decadron.  cx pending  HEME: Lovenox for vte ppx   DISPO: DNR/I 88 yo f pmhx parkinson's disease, dementia, HTN, HLD, recent diagnosis with COVID 19 admitted with     1. Hypoxic Respiratory Failure  2. COVID 19  3. Aspiration Pneumonia  4. UTI    NEURO: Restart parkinson meds as able. keep HOB >30 degrees, aspiration precautions.    CV: Hydralazine IVP prn for sbp >160  RESP: Hypoxic respiratory failure escalated to HFNC, avoiding NIPPV in setting of likely aspiraiton.  Actively titrating fio2 and LPM for spo2 >92%, decadron, aggressive chest pt, pulm suctioning as tolerated.  Patient at high risk for respiratory decompensation.   RENAL: Monitor lytes, replace lytes.   GI: NPO while in setting of respiratory failure. zofran prn for nausea  ENDO: ISS for glycemic control   ID: COVID 19, asp pna, UTI on received vanco x1. zosyn, remdesivir and decadron.  cx pending  HEME: Lovenox for vte ppx   DISPO: DNR/I.  Extremely high risk for decompensation      Critical Care time: 45 mins assessing presenting problems of acute illness that poses high probability of life threatening deterioration or end organ damage/dysfunction.  Medical decision making including Initiating plan of care, reviewing data, reviewing radiology, discussing with multidisciplinary team, non inclusive of procedures, discussing goals of care with patient/family  88 yo f pmhx parkinson's disease, dementia, HTN, HLD, recent diagnosis with COVID 19 admitted with     1. Hypoxic Respiratory Failure  2. COVID 19  3. Aspiration Pneumonia  4. UTI    NEURO: Restart parkinson meds as able. keep HOB >30 degrees, aspiration precautions.    CV: Hydralazine IVP prn for sbp >160  RESP: Hypoxic respiratory failure escalated to HFNC, avoiding NIPPV in setting of likely aspiraiton.   P:F 108, moderate ARDS. Actively titrating fio2 and LPM for spo2 >92%, decadron, aggressive chest pt, pulm suctioning as tolerated.  Patient at high risk for respiratory decompensation.   RENAL: Monitor lytes, replace lytes.   GI: NPO while in setting of respiratory failure. zofran prn for nausea  ENDO: ISS for glycemic control   ID: COVID 19, asp pna, UTI on received vanco x1. zosyn, remdesivir and decadron.  cx pending  HEME: Lovenox for vte ppx   DISPO: DNR/I.  Extremely high risk for decompensation      Critical Care time: 45 mins assessing presenting problems of acute illness that poses high probability of life threatening deterioration or end organ damage/dysfunction.  Medical decision making including Initiating plan of care, reviewing data, reviewing radiology, discussing with multidisciplinary team, non inclusive of procedures, discussing goals of care with patient/family

## 2022-07-09 NOTE — H&P ADULT - PROBLEM SELECTOR PLAN 2
diagnosed about 2 weeks ago, fully vaccinated but no boosters as per records, contact & airborne precautions, was started on steroids & Remdisvir, oxygen supplement, ID consulted as stated above.

## 2022-07-09 NOTE — ED PROVIDER NOTE - PROGRESS NOTE DETAILS
[facility] PA down to see patient. will plan to place patient on high flow. will plan to accept patient to SPCU spoke with Dr. Barreto. will plan to admit. would like patient admitted to SPCU spoke with Dr. Baeza. will see patient in hospital

## 2022-07-09 NOTE — H&P ADULT - NSHPLABSRESULTS_GEN_ALL_CORE
-      Lactate Trend   @ 22:00 Lactate:2.1    @ 19:30 Lactate:2.3    @ 15:48 Lactate:2.6                           12.4   13.63 )-----------( 213      ( 2022 15:48 )             38.9                  147<H>  |  112<H>  |  21  ----------------------------<  215<H>  3.7   |  26  |  0.83    Ca    9.1      2022 16:19    TPro  7.0  /  Alb  3.2<L>  /  TBili  1.0  /  DBili  x   /  AST  23  /  ALT  13  /  AlkPhos  111          ABG - ( 2022 20:31 )  pH, Arterial: 7.37  pH, Blood: x     /  pCO2: 39    /  pO2: 108   / HCO3: 22    / Base Excess: -2.8  /  SaO2: 99.1        Urinalysis Basic - ( 2022 15:48 )    Color: Yellow / Appearance: Clear / S.030 / pH: x  Gluc: x / Ketone: Negative  / Bili: Negative / Urobili: Negative mg/dL   Blood: x / Protein: 100 mg/dL / Nitrite: Negative   Leuk Esterase: Trace / RBC: 11-25 /HPF / WBC 6-10   Sq Epi: x / Non Sq Epi: Few / Bacteria: Moderate      PT/INR - ( 2022 15:48 )   PT: 12.6 sec;   INR: 1.09 ratio    PTT - ( 2022 15:48 )  PTT:23.5 sec      COVID-19 PCR: Detected (2022 15:48)      CT BRAIN                        PROCEDURE DATE:  2022    INTERPRETATION:  EXAM: CT head without contrast  INDICATION: Altered mental status  COMPARISON: None available.  IMPRESSION:  No acute intracranial hemorrhage, hydrocephalus or extra-axial fluid   collection.  Mild parenchymal volume loss and mild chronic microvascular ischemic   changes.  No CT evidence for acute transcortical infarction. Please note that MR   imaging is a more sensitive imaging modality for detection of acute   infarction and may be obtained as clinically warranted.        CT ANGIO CHEST PULM ART [facility]                        PROCEDURE DATE:  2022    INTERPRETATION:  Clinical information: Covid, shortness of breath, assess   for PE  COMPARISON: No prior CT for comparison  IMPRESSION:  1.  Study degraded by severe respiratory motion. No filling defect in the   pulmonary artery or its branches to the lobar level  2.  Findings throughout all 5 lobes favored to represent multifocal   aspiration given the dilated fluid-filled esophagus and degree of   endobronchial debris/secretions. A repeat chest CT scan is recommended in   6 weeks to ensure resolution  3.  Left lower lobe 9 mm nodule can be reassessed on the above   recommended follow-up          CXR:    As per my review shows normal cardiac shadow size, clear lung fields B/L, no pulmonary infiltrates, pleural effusion, or pneumothorax. Pending official report.         EKG:    As per my review shows SR at 85/min, normal MS & prolonged QTc intervals, normal QRS voltage, duration, and axis (zero), with normal transition, nonspecific ST-T abnormality.      -

## 2022-07-09 NOTE — H&P ADULT - ASSESSMENT
86 y/o F with PMH of HTN, Pre-DM, Dyslipidemia, AS, Parkinson's Disease, Alzheimer Dementia, Anxiety, and Depression presented with respiratory distress & AMS,

## 2022-07-09 NOTE — ED ADULT TRIAGE NOTE - CHIEF COMPLAINT QUOTE
short of breath and altered mental status h/o covid recently short of breath and altered mental status h/o covid recently  mucus heard to throat and pulse ox 70% suctioned thick mucus and placed on non rebreather mask and sats to 91% after

## 2022-07-09 NOTE — CONSULT NOTE ADULT - SUBJECTIVE AND OBJECTIVE BOX
88 yo f pmhx parkinson's disease, dementia, HTN, HLD, recent diagnosis with COVID 19 biba from facility with respiratory distress/ams.  In Ed patient found to be hypertensive, hypoxic to the 70s, tachypneic to the 40s, sent for CTA chest, negative for PE however with b/l ground glass and consolidation consistent with aspiraiton.  Patient placed on NRB, given Zosyn, vancomycin, ceftriaxone, decadron, labetalol and NS 2.3L.  Patient seen at bedside, on NRB, spo2 98%, tachypneic to the 30s, appeared extremely uncomfortable.  ABG obtained 7.37/39/108/22/-2.8.  ED attending spoke to family, she is a DNR/I.  Patient admitted to SPCU, to be placed on HFNC.        PAST MEDICAL & SURGICAL HISTORY:  Cholesterolosis      Alzheimer&#x27;s dementia without behavioral disturbance, unspecified timing of dementia onset      Depression, unspecified depression type      Parkinsonism, unspecified Parkinsonism type      Falls frequently      No significant past surgical history        Allergies  No Known Allergies      FAMILY HISTORY:  Noncontributory       Social History:   From facility      Review of Systems:  +sob, no other complaints       Physical Examination:    General: Elderly female, sitting in bed.      HEENT: NC/AT, NRB in place    PULM: Symmetrical thorax expansion upon respiration.  Coarse to auscultation bilaterally, R>L, +wet cough, difficulty clearing secretions    CVS: Sinus tach    ABD: Soft, nondistended, nontender, normoactive bowel sounds, no masses appreciated    EXT: No edema    SKIN: Warm     NEURO: Alert, interactive, tremulous      Medications:        ICU Vital Signs Last 24 Hrs  T(C): 36.9 (2022 19:00), Max: 36.9 (2022 19:00)  T(F): 98.5 (2022 19:00), Max: 98.5 (2022 19:00)  HR: 87 (2022 21:32) (76 - 101)  BP: 152/65 (2022 21:25) (139/62 - 219/92)  BP(mean): --  ABP: --  ABP(mean): --  RR: 28 (2022 21:25) (28 - 48)  SpO2: 100% (2022 21:32) (71% - 100%)    O2 Parameters below as of :25  Patient On (Oxygen Delivery Method): nasal cannula, high flow          Vital Signs Last 24 Hrs  T(C): 36.9 (2022 19:00), Max: 36.9 (2022 19:00)  T(F): 98.5 (2022 19:00), Max: 98.5 (2022 19:00)  HR: 87 (:32) (76 - 101)  BP: 152/65 (:25) (139/62 - 219/92)  BP(mean): --  RR: 28 (:) (28 - 48)  SpO2: 100% (:) (71% - 100%)    Parameters below as of :25  Patient On (Oxygen Delivery Method): nasal cannula, high flow        ABG - ( 2022 20:31 )  pH, Arterial: 7.37  pH, Blood: x     /  pCO2: 39    /  pO2: 108   / HCO3: 22    / Base Excess: -2.8  /  SaO2: 99.1                I&O's Detail        LABS:                        12.4   13.63 )-----------( 213      ( 2022 15:48 )             38.9     07-09    147<H>  |  112<H>  |  21  ----------------------------<  215<H>  3.7   |  26  |  0.83    Ca    9.1      2022 16:19    TPro  7.0  /  Alb  3.2<L>  /  TBili  1.0  /  DBili  x   /  AST  23  /  ALT  13  /  AlkPhos  111  07-09      PT/INR - ( 2022 15:48 )   PT: 12.6 sec;   INR: 1.09 ratio    PTT - ( 2022 15:48 )  PTT:23.5 sec      Urinalysis Basic - ( 2022 15:48 )  Color: Yellow / Appearance: Clear / S.030 / pH: x  Gluc: x / Ketone: Negative  / Bili: Negative / Urobili: Negative mg/dL   Blood: x / Protein: 100 mg/dL / Nitrite: Negative   Leuk Esterase: Trace / RBC: 11-25 /HPF / WBC 6-10   Sq Epi: x / Non Sq Epi: Few / Bacteria: Moderate      CULTURES:  Pending      RADIOLOGY:   < from: CT Angio Chest PE Protocol w/ IV Cont (22 @ 16:59) >  ACC: [id number] EXAM:  CT ANGIO CHEST PULM ART [facility]                          PROCEDURE DATE:  2022          INTERPRETATION:  Clinical information: Covid, shortness of breath, assess   for PE    TECHNIQUE: A volumetric acquisition of the chest was obtained from the   thoracic inlet to the upper abdomen during dynamic administration of   intravenous contrast. 3D MIP images were provided.    CONTRAST/COMPLICATIONS:  IV Contrast: Omnipaque 350  70 cc administered   30 cc discarded  Oral Contrast: NONE  Complications: None reported at time of study completion    COMPARISON: No prior CT for comparison    FINDINGS:    CTA: The contrast bolus is satisfactory although the study is degraded by   severe respiratory motion. No filling defect in the pulmonary artery or   its branches to the lobar level. The heart is normal in size. There are   coronary artery calcifications. No pericardial effusion. The mid   ascending aorta measures 4 cm.    Lungs/Airways/Pleura: There are secretions within the trachea extending   into the right mainstem bronchus and right upper lobe bronchus as well as   areas of mucoid impaction in both lower lobes. There are extensive   centrilobular and branching linear densities and bronchocentric   groundglass densities throughout all 5 lobes. There is a 9 mm round   nodule in the left lower lobe on image 54 series 5. No pleural effusion.    Mediastinum/Lymph nodes: The esophagus is dilated and debris-filled to   the level of the thoracic inlet.    Upper Abdomen:Evaluation degraded by severe motion artifact.    Bones and Soft Tissues: No aggressive osseous lesions.    IMPRESSION:    1.  Study degraded by severe respiratory motion. No filling defect in the   pulmonary artery or its branches to the lobar level    2.  Findings throughout all 5 lobes favored to represent multifocal   aspiration given the dilated fluid-filled esophagus and degree of   endobronchial debris/secretions. A repeat chest CT scan is recommended in   6 weeks to ensure resolution    3.  Left lower lobe 9 mm nodule can be reassessed on the above   recommended follow-up    --- End of Report ---     CAILIN MARIE M.D., Attending Radiologist  This document has been electronically signed. 2022  5:16PM    < end of copied text >      SUPPLEMENTAL O2: NRB  LINES:  AMANDA:  RAHUL:   ARLETx:   CONTACT:

## 2022-07-09 NOTE — H&P ADULT - NSHPPHYSICALEXAM_GEN_ALL_CORE
-    Vital Signs Last 24 Hrs  T(C): 36.9 (09 Jul 2022 19:00), Max: 36.9 (09 Jul 2022 19:00)  T(F): 98.5 (09 Jul 2022 19:00), Max: 98.5 (09 Jul 2022 19:00)  HR: 87 (09 Jul 2022 21:32) (76 - 101)  BP: 152/65 (09 Jul 2022 21:25) (139/62 - 219/92)  BP(mean): --  RR: 28 (09 Jul 2022 21:25) (28 - 48)  SpO2: 100% (09 Jul 2022 21:32) (71% - 100%)    Parameters below as of 09 Jul 2022 21:25  Patient On (Oxygen Delivery Method): nasal cannula, high flow        PHYSICAL EXAM:  		  GENERAL: NAD, well-groomed, well-developed.  HEAD:  Atraumatic, Norm cephalic.  EYES: PERRLA, conjunctiva clear.  ENMT: no nasal discharge, MMM.   NECK: Supple, No JVD.  NERVOUS SYSTEM:  asleep but easily awaken up by tactile stimuli, non verbal otherwise neurologically intact grossly with kinetic coarse tremors.  CHEST/LUNG: Good air entry B/L, gross rales scattered allover with no rhonchi, or wheezing.  HEART: Normal S1 & S2, grade II/VI NIR over cardiac base propagated to apex with a musical component, no extra sounds.  ABDOMEN: Soft, non-tender, non-distended; bowel sounds present, no palpable masses or organomegaly.  EXTREMITIES:  No clubbing, cyanosis, or edema.  VASCULAR: 2+ radial, DPA / PTA pulses B/L.  SKIN: No rashes or lesions, (+) stage 2 sacral decubitus ulcer.  PSYCH: calm, no agitation.

## 2022-07-09 NOTE — ED ADULT NURSE REASSESSMENT NOTE - NS ED NURSE REASSESS COMMENT FT1
Pt straight cathed using sterile technique. Pt tolerated procedure well. Sterile specimen collected. UA and Culture sent.

## 2022-07-09 NOTE — H&P ADULT - HISTORY OF PRESENT ILLNESS
This is an 86 y/o F with PMH of HTN, Pre-DM, Dyslipidemia, AS, Parkinson's Disease, Alzheimer Dementia, Anxiety, and Depression who was sent from her assisted living facility for respiratory distress & AMS, was reported to have thick secretions in her throat with dropping of her SPO2 to the low 70's, improved with suctioning & oxygen supplementation to upper 80's & low 90's, no reported fever. Patient was tested positive for COVID-19 about 2 weeks ago. At the ED her CTA showed no PE to the level of lobes, but showed evidence suggestive of aspiration PNA. Patient is non verbal, no further history could be obtained at this time.

## 2022-07-09 NOTE — H&P ADULT - PROBLEM SELECTOR PLAN 7
patient with history of pre-DM, random plasma glucose on presentation was 215 mg/dl possibly 2ry to stress hormones surge, was started on corrective insulin Lispro scale coverage Q 6h till PO resumed, check glycohemoglobin level in am.

## 2022-07-09 NOTE — PROVIDER CONTACT NOTE (EICU) - ASSESSMENT
Problem list  1. COVID pneumonia  2. Acute hypoxic resp distress  3. Aspiration pneumonia  4. Goals of care

## 2022-07-09 NOTE — H&P ADULT - PROBLEM SELECTOR PLAN 1
admitted to SPCU, received  one dose of Vancomycin & Zosyn earlier after cultures were obtained by ED team, also received one dose of Ceftriaxone for UTI, continue on Zosyn., trend TLC & monitor temp, f/u culture results, ID consult with Dr. George  was called.

## 2022-07-09 NOTE — H&P ADULT - PROBLEM SELECTOR PLAN 4
ML related to aspiration PNA & UTI, lactic acid downtrending after IVF bolus, maintain at 100 ml/h of LR, monitor I & O, trend lactate level, in addition to the above management.

## 2022-07-09 NOTE — PROVIDER CONTACT NOTE (EICU) - RECOMMENDATIONS
Plan  1. Decadron 10mg x 5 days, anti-viral  2. Start HFNC 50L titrate FiO2 to a goal of O2sat around 94-96%  3. NPO for now  4. Focus on symptom management  5. MRSA screen, start ceftriaxone 1g Q12

## 2022-07-09 NOTE — ED ADULT NURSE NOTE - CAS TRG GENERAL NORM CIRC DET
Strong peripheral pulses Mastoid Interpolation Flap Text: A decision was made to reconstruct the defect utilizing an interpolation axial flap and a staged reconstruction.  A telfa template was made of the defect.  This telfa template was then used to outline the mastoid interpolation flap.  The donor area for the pedicle flap was then injected with anesthesia.  The flap was excised through the skin and subcutaneous tissue down to the layer of the underlying musculature.  The pedicle flap was carefully excised within this deep plane to maintain its blood supply.  The edges of the donor site were undermined.   The donor site was closed in a primary fashion.  The pedicle was then rotated into position and sutured.  Once the tube was sutured into place, adequate blood supply was confirmed with blanching and refill.  The pedicle was then wrapped with xeroform gauze and dressed appropriately with a telfa and gauze bandage to ensure continued blood supply and protect the attached pedicle.

## 2022-07-09 NOTE — ED PROVIDER NOTE - NS ED ATTENDING STATEMENT MOD
This was a shared visit with the BE. I reviewed and verified the documentation and independently performed the documented:

## 2022-07-09 NOTE — PROVIDER CONTACT NOTE (EICU) - BACKGROUND
87F with a history of HTN, HL, dementia and a status of DNI/DNR from NH admitted for SOB, acute hypxic resp distress from COVID which was diagnosed about 10 days ago.  CT showed bilateral infiltrate with aspiration patterns.

## 2022-07-09 NOTE — ED PROVIDER NOTE - OBJECTIVE STATEMENT
87-year-old female with history of hypertension, hyperlipidemia, and dementia brought in by ambulance for shortness of breath and altered mental status.  Per EMS, diagnosed with COVID 10 days ago.  EMS was called due to shortness of breath and altered mental status.  Pulse ox was 70% on arrival to emergency room.  Was suctioned and thick mucus was removed and placed on nonrebreather, pulse ox improved to 90%. Limited history, patient has history of dementia and nonverbal.  PCP Dr. Bolivar   Resident at [facility] assisted living 87-year-old female with history of hypertension, hyperlipidemia, and dementia brought in by ambulance for shortness of breath and altered mental status.  Per EMS, diagnosed with COVID 10 days ago.  EMS was called due to shortness of breath and altered mental status.  Pulse ox was 70% on arrival to emergency room.  Was suctioned and thick mucus was removed and placed on nonrebreather, pulse ox improved to 90%. Limited history, patient has history of dementia and nonverbal.  PCP Dr. Bolivar   Resident at [facility] assisted living  DNR/DNI

## 2022-07-09 NOTE — H&P ADULT - PROBLEM SELECTOR PLAN 8
was started on LMWH Enoxaparin 40 mg sub Q daily for DVT prophylaxis, also Protonix 40 mg PO daily for GI prophylaxis (formulary for Omeprazole), ordered speciality bed low air loss mattress for her decubitus ulcer, wound care consult in am.

## 2022-07-09 NOTE — H&P ADULT - PROBLEM SELECTOR PLAN 3
ML related to the above, currently sating 100% on HF nasal canula, titrate oxygen to keep SPO2 >94%, pulmonary consult with Dr. Durham was called.

## 2022-07-09 NOTE — H&P ADULT - PROBLEM SELECTOR PROBLEM 2
Last Office Visit 10/21/2020  Next Office Visit 12/21/2020  Last Labs  Last Refill   predniSONE (DELTASONE) 1 MG tablet 90 tablet 0 10/13/2020     Sig: Take 3 tablets by mouth once daily    Sent to pharmacy as: predniSONE 1 MG Oral Tablet (DELTASONE)    Class: Eprescribe    E-Prescribing Status: Receipt confirmed by pharmacy (10/13/2020  3:04 PM CDT)      3 mg daily per patient     Please advise      2019 novel coronavirus disease (COVID-19)

## 2022-07-09 NOTE — ED PROVIDER NOTE - CLINICAL SUMMARY MEDICAL DECISION MAKING FREE TEXT BOX
87-year-old female with history of hypertension, hyperlipidemia, and dementia brought in by ambulance for shortness of breath and altered mental status.  Per EMS, diagnosed with COVID 10 days ago.  EMS was called due to shortness of breath and altered mental status.  Pulse ox was 70% on arrival to emergency room.  Was suctioned and thick mucus was removed and placed on nonrebreather, pulse ox improved to 90%. Limited history, patient has history of dementia and nonverbal.  PCP Dr. Bolivar   Resident at [facility]    VSS Afebrile, NAD, O2 sat 93% on RA  HEENT - clear  PERRL EOMI  Neck supple  lungs bibasilar crckles, tachypnea.  Cor S1S2 RR - MGR  Abd soft nontender, no mass or HSM, no rebound  Ext FROM intact, no edema  Neuro awake, lethargic, nonverbal., nonfocal.  Skin Warm and dry no rash.    Imp- dementia pt with recent covid and hypoxia. Plan - sepsis workup. Likely admission.

## 2022-07-09 NOTE — ED ADULT NURSE NOTE - CHIEF COMPLAINT QUOTE
short of breath and altered mental status h/o covid recently  mucus heard to throat and pulse ox 70% suctioned thick mucus and placed on non rebreather mask and sats to 91% after

## 2022-07-09 NOTE — ED ADULT NURSE NOTE - OBJECTIVE STATEMENT
87 YOF A&OX1 brought in from [facility] by EMS for SOB. as per EMS pt swabbed positive for covid+ 8 days ago but developed worsening SOB. upon assessment pt noted to have SOB, wheezing, and sputum production. pt appears lethargic. pt on room air is 80s. pt placed on non-rebreather, and suctioned in ED, oxygen increased to 93%. pt does not appear to have chest pain, n/v/d. safety maintained. 87 YOF A&OX1 brought in from [facility] by EMS for SOB. as per EMS pt swabbed positive for covid+ 14 days ago but developed worsening SOB. upon assessment pt noted to have SOB, wheezing, and sputum production. pt appears lethargic. pt on room air is 80s. pt placed on non-rebreather, and suctioned in ED, oxygen increased to 93%. pt does not appear to have chest pain, n/v/d. safety maintained.

## 2022-07-09 NOTE — ED ADULT NURSE NOTE - NSIMPLEMENTINTERV_GEN_ALL_ED
Implemented All Fall with Harm Risk Interventions:  [place] to call system. Call bell, personal items and telephone within reach. Instruct patient to call for assistance. Room bathroom lighting operational. Non-slip footwear when patient is off stretcher. Physically safe environment: no spills, clutter or unnecessary equipment. Stretcher in lowest position, wheels locked, appropriate side rails in place. Provide visual cue, wrist band, yellow gown, etc. Monitor gait and stability. Monitor for mental status changes and reorient to person, place, and time. Review medications for side effects contributing to fall risk. Reinforce activity limits and safety measures with patient and family. Provide visual clues: red socks.

## 2022-07-10 LAB
A1C WITH ESTIMATED AVERAGE GLUCOSE RESULT: 5.1 % — SIGNIFICANT CHANGE UP (ref 4–5.6)
ALBUMIN SERPL ELPH-MCNC: 3.1 G/DL — LOW (ref 3.3–5)
ALP SERPL-CCNC: 96 U/L — SIGNIFICANT CHANGE UP (ref 30–120)
ALT FLD-CCNC: 15 U/L DA — SIGNIFICANT CHANGE UP (ref 10–60)
AST SERPL-CCNC: 16 U/L — SIGNIFICANT CHANGE UP (ref 10–40)
BILIRUB DIRECT SERPL-MCNC: 0.3 MG/DL — SIGNIFICANT CHANGE UP (ref 0–0.3)
BILIRUB INDIRECT FLD-MCNC: 0.8 MG/DL — SIGNIFICANT CHANGE UP (ref 0.2–1)
BILIRUB SERPL-MCNC: 1.1 MG/DL — SIGNIFICANT CHANGE UP (ref 0.2–1.2)
CREAT SERPL-MCNC: 0.89 MG/DL — SIGNIFICANT CHANGE UP (ref 0.5–1.3)
CULTURE RESULTS: SIGNIFICANT CHANGE UP
EGFR: 63 ML/MIN/1.73M2 — SIGNIFICANT CHANGE UP
ESTIMATED AVERAGE GLUCOSE: 100 MG/DL — SIGNIFICANT CHANGE UP (ref 68–114)
INR BLD: 1.2 RATIO — HIGH (ref 0.88–1.16)
LACTATE SERPL-SCNC: 1.4 MMOL/L — SIGNIFICANT CHANGE UP (ref 0.7–2)
PROT SERPL-MCNC: 6.8 G/DL — SIGNIFICANT CHANGE UP (ref 6–8.3)
PROTHROM AB SERPL-ACNC: 14.2 SEC — HIGH (ref 10.5–13.4)
SPECIMEN SOURCE: SIGNIFICANT CHANGE UP

## 2022-07-10 PROCEDURE — 99233 SBSQ HOSP IP/OBS HIGH 50: CPT

## 2022-07-10 RX ORDER — ALBUTEROL 90 UG/1
2.5 AEROSOL, METERED ORAL EVERY 8 HOURS
Refills: 0 | Status: DISCONTINUED | OUTPATIENT
Start: 2022-07-10 | End: 2022-07-14

## 2022-07-10 RX ORDER — SODIUM CHLORIDE 9 MG/ML
1000 INJECTION, SOLUTION INTRAVENOUS
Refills: 0 | Status: DISCONTINUED | OUTPATIENT
Start: 2022-07-10 | End: 2022-07-13

## 2022-07-10 RX ORDER — AMLODIPINE BESYLATE 2.5 MG/1
5 TABLET ORAL DAILY
Refills: 0 | Status: DISCONTINUED | OUTPATIENT
Start: 2022-07-11 | End: 2022-07-14

## 2022-07-10 RX ORDER — AMLODIPINE BESYLATE 2.5 MG/1
5 TABLET ORAL ONCE
Refills: 0 | Status: COMPLETED | OUTPATIENT
Start: 2022-07-10 | End: 2022-07-10

## 2022-07-10 RX ORDER — AMLODIPINE BESYLATE 2.5 MG/1
5 TABLET ORAL DAILY
Refills: 0 | Status: DISCONTINUED | OUTPATIENT
Start: 2022-07-11 | End: 2022-07-10

## 2022-07-10 RX ADMIN — Medication 5 MILLIGRAM(S): at 08:36

## 2022-07-10 RX ADMIN — ATORVASTATIN CALCIUM 20 MILLIGRAM(S): 80 TABLET, FILM COATED ORAL at 21:38

## 2022-07-10 RX ADMIN — PIPERACILLIN AND TAZOBACTAM 25 GRAM(S): 4; .5 INJECTION, POWDER, LYOPHILIZED, FOR SOLUTION INTRAVENOUS at 01:45

## 2022-07-10 RX ADMIN — PIPERACILLIN AND TAZOBACTAM 25 GRAM(S): 4; .5 INJECTION, POWDER, LYOPHILIZED, FOR SOLUTION INTRAVENOUS at 07:34

## 2022-07-10 RX ADMIN — PIPERACILLIN AND TAZOBACTAM 25 GRAM(S): 4; .5 INJECTION, POWDER, LYOPHILIZED, FOR SOLUTION INTRAVENOUS at 15:33

## 2022-07-10 RX ADMIN — Medication 650 MILLIGRAM(S): at 15:33

## 2022-07-10 RX ADMIN — DONEPEZIL HYDROCHLORIDE 5 MILLIGRAM(S): 10 TABLET, FILM COATED ORAL at 12:31

## 2022-07-10 RX ADMIN — PIPERACILLIN AND TAZOBACTAM 25 GRAM(S): 4; .5 INJECTION, POWDER, LYOPHILIZED, FOR SOLUTION INTRAVENOUS at 21:39

## 2022-07-10 RX ADMIN — SODIUM CHLORIDE 100 MILLILITER(S): 9 INJECTION, SOLUTION INTRAVENOUS at 01:45

## 2022-07-10 RX ADMIN — SERTRALINE 100 MILLIGRAM(S): 25 TABLET, FILM COATED ORAL at 12:31

## 2022-07-10 RX ADMIN — CARBIDOPA AND LEVODOPA 1 TABLET(S): 25; 100 TABLET ORAL at 13:15

## 2022-07-10 RX ADMIN — Medication 650 MILLIGRAM(S): at 16:30

## 2022-07-10 RX ADMIN — Medication 81 MILLIGRAM(S): at 12:31

## 2022-07-10 RX ADMIN — Medication 2: at 01:45

## 2022-07-10 RX ADMIN — CARBIDOPA AND LEVODOPA 1 TABLET(S): 25; 100 TABLET ORAL at 21:38

## 2022-07-10 RX ADMIN — Medication 4: at 23:09

## 2022-07-10 RX ADMIN — ENOXAPARIN SODIUM 40 MILLIGRAM(S): 100 INJECTION SUBCUTANEOUS at 05:14

## 2022-07-10 RX ADMIN — REMDESIVIR 500 MILLIGRAM(S): 5 INJECTION INTRAVENOUS at 05:14

## 2022-07-10 RX ADMIN — SODIUM CHLORIDE 100 MILLILITER(S): 9 INJECTION, SOLUTION INTRAVENOUS at 23:06

## 2022-07-10 RX ADMIN — Medication 6 MILLIGRAM(S): at 06:10

## 2022-07-10 RX ADMIN — SODIUM CHLORIDE 100 MILLILITER(S): 9 INJECTION, SOLUTION INTRAVENOUS at 07:35

## 2022-07-10 RX ADMIN — MEMANTINE HYDROCHLORIDE 10 MILLIGRAM(S): 10 TABLET ORAL at 17:50

## 2022-07-10 NOTE — CONSULT NOTE ADULT - ASSESSMENT
86 y/o F with PMH of HTN, Pre-DM, Dyslipidemia, AS, Parkinson's Disease, Alzheimer Dementia, Anxiety, and Depression who was sent from her assisted living facility for respiratory distress & AMS,    AMS  resp distress  Atelectasis  PNA  Parkinson's Disease  Ataxic gait  OP  OA  HLD  HTN  Dementia  Depression  COVID    emp ABX  ID eval  COVID isolation  cough rx regimen  HOB elev - asp prec - oral hygiene  assist with all NEEDS  GOC discussion - DNR  Ct chest - neg for PE - although a limited study  fio2 support and titration - keep sat > 88 pct  monitor VS and HD and Sat  prognosis guarded  medical supp regimen in progress   pt is a resident of [facility]

## 2022-07-10 NOTE — CONSULT NOTE ADULT - SUBJECTIVE AND OBJECTIVE BOX
[facility], Division of Infectious Diseases   STUART Alvarez, ALEKS Love G. Casimir  [phone]   weekends and after hours [phone]    GENO VERA  87y, Female  [id number]    =-  HPI: history per chart and daughter at bedside   This is an 88 y/o F with PMH of HTN, Pre-DM, Dyslipidemia, AS, Parkinson's Disease, Alzheimer Dementia, valvuloplasty who was sent from her assisted living facility for respiratory distress & AMS, was reported to have thick secretions in her throat with dropping of her SPO2 to the low 70's, improved with suctioning & oxygen supplementation to upper 80's & low 90's, no reported fever. Patient was tested positive for COVID-19 about 2 weeks ago. per daughter at bedside did not get paxlovid or any covid 19 therapy  currently awake nonverbal      PMH/PSH--  Cholesterolosis  Alzheimer&#x27;s dementia without behavioral disturbance, unspecified timing of dementia onset  Depression, unspecified depression type  Parkinsonism, unspecified Parkinsonism type  Falls frequently  partial hip replacement  aortic valvuloplasty        Allergies--  NKDA    Medications--  Antibiotics: piperacillin/tazobactam IVPB.. 3.375 Gram(s) IV Intermittent every 8 hours  remdesivir  IVPB   IV Intermittent     Immunologic:   Other: acetaminophen     Tablet .. PRN  ALBUTerol    0.083% PRN  amLODIPine   Tablet  aspirin enteric coated  atorvastatin  carbidopa/levodopa  25/100  dexAMETHasone  Injectable  dextrose 5%.  dextrose 5%.  dextrose 50% Injectable  dextrose 50% Injectable  dextrose 50% Injectable  dextrose Oral Gel PRN  donepezil  enoxaparin Injectable  glucagon  Injectable  hydrALAZINE Injectable PRN  insulin lispro (ADMELOG) corrective regimen sliding scale  lactated ringers.  melatonin PRN  memantine  ondansetron Injectable PRN  sertraline      Social History--  unable to btian     Family/Marital History--  No pertinent family history in first degree relatives    Travel/Environmental/Occupational History:  nc    Review of Systems:  REVIEW OF SYSTEMS  unable to obtain    Physical Exam--  Vital Signs: T(F): 97.7 (07-10-22 @ 12:14), Max: 99.8 (07-10-22 @ 11:00)  HR: 97 (07-10-22 @ 11:00)  BP: 148/548 (07-10-22 @ 11:00)  RR: 31 (07-10-22 @ 11:00)  SpO2: 92% (07-10-22 @ 11:00)  Wt(kg): --  General: Nontoxic-appearing Female in no acute distress.  HEENT: eyes open .  Neck: Not rigid. No sense of mass.  Nodes: None palpable.  Lungs: Clear bilaterally without rales, wheezing or rhonchi  Heart: Regular rate and rhythm. No Murmur.  Abdomen: Bowel sounds present and normoactive. Soft.   Extremities: No cyanosis or clubbing. trace edema.   Skin: Warm. Dry. Good turgor. No rash. No vasculitic stigmata.          Laboratory & Imaging Data--  CBC                        12.4   13.63 )-----------( 213      ( 09 Jul 2022 15:48 )             38.9       Chemistries  07-10    x   |  x   |  x   ----------------------------<  x   x    |  x   |  0.89    Ca    9.1      09 Jul 2022 16:19    TPro  6.8  /  Alb  3.1<L>  /  TBili  1.1  /  DBili  0.3  /  AST  16  /  ALT  15  /  AlkPhos  96  07-10      Culture Data      < from: CT Angio Chest PE Protocol w/ IV Cont (07.09.22 @ 16:59) >  COMPARISON: No prior CT for comparison    FINDINGS:    CTA: The contrast bolus is satisfactory although the study is degraded by   severe respiratory motion. No filling defect in the pulmonary artery or   its branches to the lobar level. The heart is normal in size. There are   coronary artery calcifications. No pericardial effusion. The mid   ascending aorta measures 4 cm.    Lungs/Airways/Pleura: There are secretions within the trachea extending   into the right mainstem bronchus and right upper lobe bronchus as well as   areas of mucoid impaction in both lower lobes. There are extensive   centrilobular and branching linear densities and bronchocentric   groundglass densities throughout all 5 lobes. There is a 9 mm round   nodule in the left lower lobe on image 54 series 5. No pleural effusion.    Mediastinum/Lymph nodes: The esophagus is dilated and debris-filled to   the level of the thoracic inlet.    Upper Abdomen:Evaluation degraded by severe motion artifact.    Bones and Soft Tissues: No aggressive osseous lesions.    IMPRESSION:    1.  Study degraded by severe respiratory motion. No filling defect in the   pulmonary artery or its branches to the lobar level    2.  Findings throughout all 5 lobes favored to represent multifocal   aspiration given the dilated fluid-filled esophagus and degree of   endobronchial debris/secretions. A repeat chest CT scan is recommended in   6 weeks to ensure resolution    3.  Left lower lobe 9 mm nodule can be reassessed on the above   recommended follow-up    < end of copied text >  < from: CT Head No Cont (07.09.22 @ 16:58) >  or destructive calvarial lesion identified. No significant scalp soft   tissue swelling identified.          IMPRESSION:    No acute intracranial hemorrhage, hydrocephalus or extra-axial fluid   collection.  Mild parenchymal volume loss and mild chronic microvascular ischemic   changes.  No CT evidence for acute transcortical infarction. Please note that MR   imaging is a more sensitive imaging modality for detection of acute   infarction and may be obtained as clinically warranted.    If there are questions/need for clarification regarding this report, Dr. Tutu Lang can be best reached via the patient secure hospital email   system at [email or web address].    < end of copied text >

## 2022-07-10 NOTE — PATIENT PROFILE ADULT - FALL HARM RISK - HARM RISK INTERVENTIONS

## 2022-07-10 NOTE — CONSULT NOTE ADULT - ASSESSMENT
This is an 88 y/o F with PMH of HTN, Pre-DM, Dyslipidemia, AS, Parkinson's Disease, Alzheimer Dementia, valvuloplasty who was sent from her assisted living facility for respiratory distress & AMS,   recent covid 19 6/28 day one of symptoms  pneumonia  leukocytosis    plan  ct reviewed with infiltrates b/l pt at risk for aspiration  for covid 19 day 13 puts her out of window for antivirals  stop remdesivir  ac ppx  trend biomarkers  decadron started for hypoxia    check ferritin/procal ratio  cont zosyn for possible aspiration day 2 of 5  strict aspiration precautions

## 2022-07-10 NOTE — CONSULT NOTE ADULT - SUBJECTIVE AND OBJECTIVE BOX
Date/Time Patient Seen:  		  Referring MD:   Data Reviewed	       Patient is a 87y old  Female who presents with a chief complaint of Respiratory distress. (09 Jul 2022 22:39)      Subjective/HPI  vs noted  labs reviewed  H and P reviewed  ER provider note reviewed  imaging reviewed  CT chest reviewed  on o2 support  on isolation      History of Present Illness:   This is an 88 y/o F with PMH of HTN, Pre-DM, Dyslipidemia, AS, Parkinson's Disease, Alzheimer Dementia, Anxiety, and Depression who was sent from her assisted living facility for respiratory distress & AMS, was reported to have thick secretions in her throat with dropping of her SPO2 to the low 70's, improved with suctioning & oxygen supplementation to upper 80's & low 90's, no reported fever. Patient was tested positive for COVID-19 about 2 weeks ago. At the ED her CTA showed no PE to the level of lobes, but showed evidence suggestive of aspiration PNA. Patient is non verbal, no further history could be obtained at this time.   FAMILY HISTORY:  No pertinent family history in first degree relatives. No pertinent family history of: Unable to obtain as stated above.     Social History:  Social History (marital status, living situation, occupation, tobacco use, alcohol and drug use, and sexual history): -    , resides in an assisted living facility, former smoker, no ETOH or drug abuse.     Tobacco Screening:  · Core Measure Site	No  · Has the patient used tobacco in the past 30 days?	No    Risk Assessment:    Present on Admission:  Deep Venous Thrombosis	no  Pulmonary Embolus	no  Urinary Catheter	no  Central Venous Catheter/PICC Line	no  Surgical Site Incision	no  Pressure Ulcer(s)	yes     Heart Failure:  Does this patient have a history of or has been diagnosed with heart failure? no.  PAST MEDICAL & SURGICAL HISTORY:  Cholesterolosis    Alzheimer&#x27;s dementia without behavioral disturbance, unspecified timing of dementia onset    Depression, unspecified depression type    Parkinsonism, unspecified Parkinsonism type    Falls frequently    No significant past surgical history          Medication list         MEDICATIONS  (STANDING):  aspirin enteric coated 81 milliGRAM(s) Oral daily  atorvastatin 20 milliGRAM(s) Oral at bedtime  carbidopa/levodopa  25/100 1 Tablet(s) Oral three times a day  dexAMETHasone  Injectable 6 milliGRAM(s) IV Push daily  dextrose 5%. 1000 milliLiter(s) (50 mL/Hr) IV Continuous <Continuous>  dextrose 5%. 1000 milliLiter(s) (100 mL/Hr) IV Continuous <Continuous>  dextrose 50% Injectable 25 Gram(s) IV Push once  dextrose 50% Injectable 12.5 Gram(s) IV Push once  dextrose 50% Injectable 25 Gram(s) IV Push once  donepezil 5 milliGRAM(s) Oral daily  enoxaparin Injectable 40 milliGRAM(s) SubCutaneous every 24 hours  glucagon  Injectable 1 milliGRAM(s) IntraMuscular once  insulin lispro (ADMELOG) corrective regimen sliding scale   SubCutaneous every 6 hours  lactated ringers. 1000 milliLiter(s) (100 mL/Hr) IV Continuous <Continuous>  memantine 10 milliGRAM(s) Oral two times a day  piperacillin/tazobactam IVPB.. 3.375 Gram(s) IV Intermittent every 8 hours  remdesivir  IVPB   IV Intermittent   sertraline 100 milliGRAM(s) Oral daily    MEDICATIONS  (PRN):  acetaminophen     Tablet .. 650 milliGRAM(s) Oral every 6 hours PRN Temp greater or equal to 38C (100.4F), Mild Pain (1 - 3)  dextrose Oral Gel 15 Gram(s) Oral once PRN Blood Glucose LESS THAN 70 milliGRAM(s)/deciliter  hydrALAZINE Injectable 5 milliGRAM(s) IV Push every 6 hours PRN SBP >160  melatonin 3 milliGRAM(s) Oral at bedtime PRN Insomnia  ondansetron Injectable 4 milliGRAM(s) IV Push every 6 hours PRN Nausea and/or Vomiting         Vitals log        ICU Vital Signs Last 24 Hrs  T(C): 36.6 (09 Jul 2022 23:53), Max: 36.9 (09 Jul 2022 19:00)  T(F): 97.9 (09 Jul 2022 23:53), Max: 98.5 (09 Jul 2022 19:00)  HR: 75 (10 Jul 2022 05:14) (72 - 101)  BP: 168/89 (10 Jul 2022 05:14) (122/68 - 219/92)  BP(mean): --  ABP: --  ABP(mean): --  RR: 1 (10 Jul 2022 05:14) (1 - 48)  SpO2: 96% (10 Jul 2022 05:14) (71% - 100%)    O2 Parameters below as of 10 Jul 2022 05:14  Patient On (Oxygen Delivery Method): room air                 Input and Output:  I&O's Detail      Lab Data                        12.4   13.63 )-----------( 213      ( 09 Jul 2022 15:48 )             38.9     07-10    x   |  x   |  x   ----------------------------<  x   x    |  x   |  0.89    Ca    9.1      09 Jul 2022 16:19    TPro  6.8  /  Alb  3.1<L>  /  TBili  1.1  /  DBili  0.3  /  AST  16  /  ALT  15  /  AlkPhos  96  07-10    ABG - ( 09 Jul 2022 20:31 )  pH, Arterial: 7.37  pH, Blood: x     /  pCO2: 39    /  pO2: 108   / HCO3: 22    / Base Excess: -2.8  /  SaO2: 99.1                    Review of Systems	    weakness    Objective     Physical Examination  on o2 support  heart s1s2  lung dec BS  abd soft        Pertinent Lab findings & Imaging      Lay:  NO   Adequate UO     I&O's Detail           Discussed with:     Cultures:	        Radiology        ACC: [id number] EXAM:  CT ANGIO CHEST PULM [facility]                          PROCEDURE DATE:  07/09/2022          INTERPRETATION:  Clinical information: Covid, shortness of breath, assess   for PE    TECHNIQUE: A volumetric acquisition of the chest was obtained from the   thoracic inlet to the upper abdomen during dynamic administration of   intravenous contrast. 3D MIP images were provided.    CONTRAST/COMPLICATIONS:  IV Contrast: Omnipaque 350  70 cc administered   30 cc discarded  Oral Contrast: NONE  Complications: None reported at time of study completion    COMPARISON: No prior CT for comparison    FINDINGS:    CTA: The contrast bolus is satisfactory although the study is degraded by   severe respiratory motion. No filling defect in the pulmonary artery or   its branches to the lobar level. The heart is normal in size. There are   coronary artery calcifications. No pericardial effusion. The mid   ascending aorta measures 4 cm.    Lungs/Airways/Pleura: There are secretions within the trachea extending   into the right mainstem bronchus and right upper lobe bronchus as well as   areas of mucoid impaction in both lower lobes. There are extensive   centrilobular and branching linear densities and bronchocentric   groundglass densities throughout all 5 lobes. There is a 9 mm round   nodule in the left lower lobe on image 54 series 5. No pleural effusion.    Mediastinum/Lymph nodes: The esophagus is dilated and debris-filled to   the level of the thoracic inlet.    Upper Abdomen: Evaluation degraded by severe motion artifact.    Bones and Soft Tissues: No aggressive osseous lesions.    IMPRESSION:    1.  Study degraded by severe respiratory motion. No filling defect in the   pulmonary artery or its branches to the lobar level    2.  Findings throughout all 5 lobes favored to represent multifocal   aspiration given the dilated fluid-filled esophagus and degree of   endobronchial debris/secretions. A repeat chest CT scan is recommended in   6 weeks to ensure resolution    3.  Left lower lobe 9 mm nodule can be reassessed on the above   recommended follow-up    --- End of Report ---             CAILIN MARIE M.D., Attending Radiologist  This document has been electronically signed. Jul 9 2022  5:16PM

## 2022-07-10 NOTE — PATIENT PROFILE ADULT - ..
Last OV 2/26/19  Future Appointments   Date Time Provider Dorene Padilla   5/28/2019 10:00 AM DO SAHIL Ltitle [facility]
10-Jul-2022 14:03:53

## 2022-07-10 NOTE — ED ADULT NURSE REASSESSMENT NOTE - NS ED NURSE REASSESS COMMENT FT1
Pt's daughter called with concerns about the whereabouts of the pts glasses - she stated she was gonna call the ambulance service

## 2022-07-11 DIAGNOSIS — E87.6 HYPOKALEMIA: ICD-10-CM

## 2022-07-11 LAB
-  STAPHYLOCOCCUS EPIDERMIDIS, METHICILLIN RESISTANT: SIGNIFICANT CHANGE UP
ALBUMIN SERPL ELPH-MCNC: 2.7 G/DL — LOW (ref 3.3–5)
ALBUMIN SERPL ELPH-MCNC: 2.9 G/DL — LOW (ref 3.3–5)
ALP SERPL-CCNC: 90 U/L — SIGNIFICANT CHANGE UP (ref 30–120)
ALP SERPL-CCNC: 95 U/L — SIGNIFICANT CHANGE UP (ref 30–120)
ALT FLD-CCNC: <10 U/L DA — LOW (ref 10–60)
ALT FLD-CCNC: <10 U/L DA — LOW (ref 10–60)
ANION GAP SERPL CALC-SCNC: 10 MMOL/L — SIGNIFICANT CHANGE UP (ref 5–17)
ANION GAP SERPL CALC-SCNC: 11 MMOL/L — SIGNIFICANT CHANGE UP (ref 5–17)
AST SERPL-CCNC: 17 U/L — SIGNIFICANT CHANGE UP (ref 10–40)
AST SERPL-CCNC: 18 U/L — SIGNIFICANT CHANGE UP (ref 10–40)
BILIRUB DIRECT SERPL-MCNC: 0.5 MG/DL — HIGH (ref 0–0.3)
BILIRUB INDIRECT FLD-MCNC: 1.3 MG/DL — HIGH (ref 0.2–1)
BILIRUB SERPL-MCNC: 1.8 MG/DL — HIGH (ref 0.2–1.2)
BILIRUB SERPL-MCNC: 1.8 MG/DL — HIGH (ref 0.2–1.2)
BUN SERPL-MCNC: 15 MG/DL — SIGNIFICANT CHANGE UP (ref 7–23)
BUN SERPL-MCNC: 15 MG/DL — SIGNIFICANT CHANGE UP (ref 7–23)
CALCIUM SERPL-MCNC: 9.5 MG/DL — SIGNIFICANT CHANGE UP (ref 8.4–10.5)
CALCIUM SERPL-MCNC: 9.7 MG/DL — SIGNIFICANT CHANGE UP (ref 8.4–10.5)
CHLORIDE SERPL-SCNC: 105 MMOL/L — SIGNIFICANT CHANGE UP (ref 96–108)
CHLORIDE SERPL-SCNC: 110 MMOL/L — HIGH (ref 96–108)
CO2 SERPL-SCNC: 27 MMOL/L — SIGNIFICANT CHANGE UP (ref 22–31)
CO2 SERPL-SCNC: 28 MMOL/L — SIGNIFICANT CHANGE UP (ref 22–31)
CREAT SERPL-MCNC: 0.71 MG/DL — SIGNIFICANT CHANGE UP (ref 0.5–1.3)
CREAT SERPL-MCNC: 1.15 MG/DL — SIGNIFICANT CHANGE UP (ref 0.5–1.3)
EGFR: 46 ML/MIN/1.73M2 — LOW
EGFR: 83 ML/MIN/1.73M2 — SIGNIFICANT CHANGE UP
FERRITIN SERPL-MCNC: 516 NG/ML — HIGH (ref 15–150)
GLUCOSE SERPL-MCNC: 182 MG/DL — HIGH (ref 70–99)
GLUCOSE SERPL-MCNC: 98 MG/DL — SIGNIFICANT CHANGE UP (ref 70–99)
GRAM STN FLD: SIGNIFICANT CHANGE UP
HCT VFR BLD CALC: 36 % — SIGNIFICANT CHANGE UP (ref 34.5–45)
HCT VFR BLD CALC: 36.7 % — SIGNIFICANT CHANGE UP (ref 34.5–45)
HGB BLD-MCNC: 12 G/DL — SIGNIFICANT CHANGE UP (ref 11.5–15.5)
HGB BLD-MCNC: 12.3 G/DL — SIGNIFICANT CHANGE UP (ref 11.5–15.5)
INR BLD: 1.57 RATIO — HIGH (ref 0.88–1.16)
MAGNESIUM SERPL-MCNC: 1.7 MG/DL — SIGNIFICANT CHANGE UP (ref 1.6–2.6)
MCHC RBC-ENTMCNC: 27.9 PG — SIGNIFICANT CHANGE UP (ref 27–34)
MCHC RBC-ENTMCNC: 27.9 PG — SIGNIFICANT CHANGE UP (ref 27–34)
MCHC RBC-ENTMCNC: 33.3 GM/DL — SIGNIFICANT CHANGE UP (ref 32–36)
MCHC RBC-ENTMCNC: 33.5 GM/DL — SIGNIFICANT CHANGE UP (ref 32–36)
MCV RBC AUTO: 83.2 FL — SIGNIFICANT CHANGE UP (ref 80–100)
MCV RBC AUTO: 83.7 FL — SIGNIFICANT CHANGE UP (ref 80–100)
METHOD TYPE: SIGNIFICANT CHANGE UP
NRBC # BLD: 0 /100 WBCS — SIGNIFICANT CHANGE UP (ref 0–0)
NRBC # BLD: 0 /100 WBCS — SIGNIFICANT CHANGE UP (ref 0–0)
PHOSPHATE SERPL-MCNC: 1.9 MG/DL — LOW (ref 2.5–4.5)
PLATELET # BLD AUTO: 190 K/UL — SIGNIFICANT CHANGE UP (ref 150–400)
PLATELET # BLD AUTO: 205 K/UL — SIGNIFICANT CHANGE UP (ref 150–400)
POTASSIUM SERPL-MCNC: 2.3 MMOL/L — CRITICAL LOW (ref 3.5–5.3)
POTASSIUM SERPL-MCNC: 2.6 MMOL/L — CRITICAL LOW (ref 3.5–5.3)
POTASSIUM SERPL-MCNC: 3.7 MMOL/L — SIGNIFICANT CHANGE UP (ref 3.5–5.3)
POTASSIUM SERPL-SCNC: 2.3 MMOL/L — CRITICAL LOW (ref 3.5–5.3)
POTASSIUM SERPL-SCNC: 2.6 MMOL/L — CRITICAL LOW (ref 3.5–5.3)
POTASSIUM SERPL-SCNC: 3.7 MMOL/L — SIGNIFICANT CHANGE UP (ref 3.5–5.3)
PROCALCITONIN SERPL-MCNC: 0.37 NG/ML — HIGH (ref 0.02–0.1)
PROT SERPL-MCNC: 6.3 G/DL — SIGNIFICANT CHANGE UP (ref 6–8.3)
PROT SERPL-MCNC: 6.4 G/DL — SIGNIFICANT CHANGE UP (ref 6–8.3)
PROTHROM AB SERPL-ACNC: 18.1 SEC — HIGH (ref 10.5–13.4)
RBC # BLD: 4.3 M/UL — SIGNIFICANT CHANGE UP (ref 3.8–5.2)
RBC # BLD: 4.41 M/UL — SIGNIFICANT CHANGE UP (ref 3.8–5.2)
RBC # FLD: 13.6 % — SIGNIFICANT CHANGE UP (ref 10.3–14.5)
RBC # FLD: 13.9 % — SIGNIFICANT CHANGE UP (ref 10.3–14.5)
SODIUM SERPL-SCNC: 143 MMOL/L — SIGNIFICANT CHANGE UP (ref 135–145)
SODIUM SERPL-SCNC: 148 MMOL/L — HIGH (ref 135–145)
WBC # BLD: 16.13 K/UL — HIGH (ref 3.8–10.5)
WBC # BLD: 17.76 K/UL — HIGH (ref 3.8–10.5)
WBC # FLD AUTO: 16.13 K/UL — HIGH (ref 3.8–10.5)
WBC # FLD AUTO: 17.76 K/UL — HIGH (ref 3.8–10.5)

## 2022-07-11 PROCEDURE — 99233 SBSQ HOSP IP/OBS HIGH 50: CPT

## 2022-07-11 RX ORDER — POTASSIUM CHLORIDE 20 MEQ
10 PACKET (EA) ORAL
Refills: 0 | Status: COMPLETED | OUTPATIENT
Start: 2022-07-11 | End: 2022-07-11

## 2022-07-11 RX ORDER — VANCOMYCIN HCL 1 G
VIAL (EA) INTRAVENOUS
Refills: 0 | Status: DISCONTINUED | OUTPATIENT
Start: 2022-07-11 | End: 2022-07-11

## 2022-07-11 RX ORDER — INSULIN LISPRO 100/ML
VIAL (ML) SUBCUTANEOUS
Refills: 0 | Status: DISCONTINUED | OUTPATIENT
Start: 2022-07-11 | End: 2022-07-14

## 2022-07-11 RX ORDER — POTASSIUM CHLORIDE 20 MEQ
40 PACKET (EA) ORAL EVERY 4 HOURS
Refills: 0 | Status: COMPLETED | OUTPATIENT
Start: 2022-07-11 | End: 2022-07-11

## 2022-07-11 RX ORDER — VANCOMYCIN HCL 1 G
1000 VIAL (EA) INTRAVENOUS ONCE
Refills: 0 | Status: COMPLETED | OUTPATIENT
Start: 2022-07-11 | End: 2022-07-11

## 2022-07-11 RX ORDER — MAGNESIUM SULFATE 500 MG/ML
1 VIAL (ML) INJECTION ONCE
Refills: 0 | Status: COMPLETED | OUTPATIENT
Start: 2022-07-11 | End: 2022-07-11

## 2022-07-11 RX ADMIN — CARBIDOPA AND LEVODOPA 1 TABLET(S): 25; 100 TABLET ORAL at 05:09

## 2022-07-11 RX ADMIN — MEMANTINE HYDROCHLORIDE 10 MILLIGRAM(S): 10 TABLET ORAL at 05:09

## 2022-07-11 RX ADMIN — PIPERACILLIN AND TAZOBACTAM 25 GRAM(S): 4; .5 INJECTION, POWDER, LYOPHILIZED, FOR SOLUTION INTRAVENOUS at 15:05

## 2022-07-11 RX ADMIN — Medication 4: at 12:03

## 2022-07-11 RX ADMIN — Medication 40 MILLIEQUIVALENT(S): at 14:55

## 2022-07-11 RX ADMIN — Medication 6 MILLIGRAM(S): at 05:09

## 2022-07-11 RX ADMIN — Medication 81 MILLIGRAM(S): at 11:57

## 2022-07-11 RX ADMIN — Medication 100 MILLIEQUIVALENT(S): at 11:56

## 2022-07-11 RX ADMIN — Medication 250 MILLIGRAM(S): at 08:39

## 2022-07-11 RX ADMIN — SERTRALINE 100 MILLIGRAM(S): 25 TABLET, FILM COATED ORAL at 11:57

## 2022-07-11 RX ADMIN — CARBIDOPA AND LEVODOPA 1 TABLET(S): 25; 100 TABLET ORAL at 14:55

## 2022-07-11 RX ADMIN — Medication 100 MILLIEQUIVALENT(S): at 10:48

## 2022-07-11 RX ADMIN — Medication 40 MILLIEQUIVALENT(S): at 09:39

## 2022-07-11 RX ADMIN — SODIUM CHLORIDE 100 MILLILITER(S): 9 INJECTION, SOLUTION INTRAVENOUS at 08:39

## 2022-07-11 RX ADMIN — PIPERACILLIN AND TAZOBACTAM 25 GRAM(S): 4; .5 INJECTION, POWDER, LYOPHILIZED, FOR SOLUTION INTRAVENOUS at 06:27

## 2022-07-11 RX ADMIN — Medication 5 MILLIGRAM(S): at 04:58

## 2022-07-11 RX ADMIN — PIPERACILLIN AND TAZOBACTAM 25 GRAM(S): 4; .5 INJECTION, POWDER, LYOPHILIZED, FOR SOLUTION INTRAVENOUS at 23:02

## 2022-07-11 RX ADMIN — SODIUM CHLORIDE 100 MILLILITER(S): 9 INJECTION, SOLUTION INTRAVENOUS at 21:31

## 2022-07-11 RX ADMIN — DONEPEZIL HYDROCHLORIDE 5 MILLIGRAM(S): 10 TABLET, FILM COATED ORAL at 11:57

## 2022-07-11 RX ADMIN — Medication 40 MILLIEQUIVALENT(S): at 17:33

## 2022-07-11 RX ADMIN — CARBIDOPA AND LEVODOPA 1 TABLET(S): 25; 100 TABLET ORAL at 21:30

## 2022-07-11 RX ADMIN — MEMANTINE HYDROCHLORIDE 10 MILLIGRAM(S): 10 TABLET ORAL at 17:33

## 2022-07-11 RX ADMIN — ENOXAPARIN SODIUM 40 MILLIGRAM(S): 100 INJECTION SUBCUTANEOUS at 05:09

## 2022-07-11 RX ADMIN — Medication 100 GRAM(S): at 17:32

## 2022-07-11 RX ADMIN — Medication 100 MILLIEQUIVALENT(S): at 09:38

## 2022-07-11 RX ADMIN — AMLODIPINE BESYLATE 5 MILLIGRAM(S): 2.5 TABLET ORAL at 05:09

## 2022-07-11 RX ADMIN — ATORVASTATIN CALCIUM 20 MILLIGRAM(S): 80 TABLET, FILM COATED ORAL at 21:30

## 2022-07-11 NOTE — SWALLOW BEDSIDE ASSESSMENT ADULT - SWALLOW EVAL: DIAGNOSIS
1. Pt demonstrates mild oral dysphagia for puree solids marked by reduced oral opening/reduced oral grading and increased time required for bolus prep/posterior transfer. Oral cavity is clear post swallow. 2. Pt presents with moderate oral dysphagia for minced/moist solids marked by reduced oral grading, poor quality/delayed mastication, delayed posterior transfer and lingual residue post swallow. 3. Pt presents with functional pharyngeal stage swallow for  all textures presented,  marked by appearance of present swallow initiation and hyolaryngeal movement and no clinical signs/symptoms of aspiration/penetration,

## 2022-07-11 NOTE — DIETITIAN INITIAL EVALUATION ADULT - ENTER TO (G/KG)
I spoke to the patient's grandson, Devaughn Zavaleta, today since I was informed that the patient is still having nausea and vomiting.  Was not able to reach the patient on her mobile phone, and called her home phone.  According to the patient's grandson, she is having nausea and vomiting.  We reviewed the patient's [employer] treatment plan and there was no dose to the GI tract and therefore it is unlikely a the patient's nausea and vomiting is from her radio surgery.  I therefore asked the patient's grandson to bring Ms. Washington to urgent care or see her primary physician today.  He is agreeable to that recommendation.    1.3

## 2022-07-11 NOTE — SWALLOW BEDSIDE ASSESSMENT ADULT - COMMENTS
per charting - This is an 88 y/o F with PMH of HTN, Pre-DM, Dyslipidemia, AS, Parkinson's Disease, Alzheimer Dementia, Anxiety, and Depression who was sent from her assisted living facility for respiratory distress & AMS, was reported to have thick secretions in her throat with dropping of her SPO2 to the low 70's, improved with suctioning & oxygen supplementation to upper 80's & low 90's, no reported fever. Patient was tested positive for COVID-19 about 2 weeks ago. At the ED her CTA showed no PE to the level of lobes, but showed evidence suggestive of aspiration PNA. Patient is non verbal, no further history could be obtained at this time.  (09 Jul 2022 22:39)    WBC elevated  CT chest - Findings throughout all 5 lobes favored to represent multifocal   aspiration given the dilated fluid-filled esophagus and degree of   endobronchial debris/secretions. A repeat chest CT scan is recommended in   6 weeks to ensure resolution    Pt was received in  bed, AAOx1, not consistently following low level commands. Poor historian. RN reporting patient ate breakfast without signs of aspiration this morning.

## 2022-07-11 NOTE — PROGRESS NOTE ADULT - PROBLEM SELECTOR PLAN 1
continue on iv zosyn  wbc trending upwards from 14 K to 17 K  suspect from steroid on dexamethasone   dyspagia diet  speech and swallow eval   continue on vanco and zosyn per crcl continue on iv zosyn/ vanco per crcl   wbc trending upwards from 14 K to 17 K  suspect from steroid on dexamethasone   dyspagia diet  speech and swallow eval   continue on vanco and zosyn per crcl

## 2022-07-11 NOTE — DIETITIAN INITIAL EVALUATION ADULT - OTHER INFO
88 yo female admit from [facility] with Aspiration PNA, COVID+; visited patient this morning at breakfast when NSG was feeding patient; patient tolerating Puree meal well; however, NSG reports that patient can be easily agitated; in fact, patient refused dinner meal last night; noted stage 2 sacrum pressure injury, as well as coccyx unstageable pressure injury; no edema noted; daughter Chey is HCP and patient has a DNR; spoke with Chey via phone and she reports that patient moved to [facility] 5/2022 after living at home with private aides; patient wears glasses all of the time; was tolerating Regular diet and self feeding without difficulty; preferences reviewed with daughter; no weight changes reported; visual inspection of patient does not indicate s/s malnutrition @ this time; SLP eval pending; informed daughter of the same, and need for caution as patient was admit with Asp PNA; daughter agreeable to the same; will continue to follow and monitor PO intake; will adjust recommendations as warranted; RD to remain available

## 2022-07-11 NOTE — DIETITIAN INITIAL EVALUATION ADULT - PERTINENT MEDS FT
MEDICATIONS  (STANDING):  amLODIPine   Tablet 5 milliGRAM(s) Oral daily  aspirin enteric coated 81 milliGRAM(s) Oral daily  atorvastatin 20 milliGRAM(s) Oral at bedtime  carbidopa/levodopa  25/100 1 Tablet(s) Oral three times a day  dexAMETHasone  Injectable 6 milliGRAM(s) IV Push daily  dextrose 5%. 1000 milliLiter(s) (50 mL/Hr) IV Continuous <Continuous>  dextrose 5%. 1000 milliLiter(s) (100 mL/Hr) IV Continuous <Continuous>  dextrose 50% Injectable 25 Gram(s) IV Push once  dextrose 50% Injectable 12.5 Gram(s) IV Push once  dextrose 50% Injectable 25 Gram(s) IV Push once  donepezil 5 milliGRAM(s) Oral daily  enoxaparin Injectable 40 milliGRAM(s) SubCutaneous every 24 hours  glucagon  Injectable 1 milliGRAM(s) IntraMuscular once  insulin lispro (ADMELOG) corrective regimen sliding scale   SubCutaneous every 6 hours  lactated ringers. 1000 milliLiter(s) (100 mL/Hr) IV Continuous <Continuous>  memantine 10 milliGRAM(s) Oral two times a day  piperacillin/tazobactam IVPB.. 3.375 Gram(s) IV Intermittent every 8 hours  potassium chloride    Tablet ER 40 milliEquivalent(s) Oral every 4 hours  potassium chloride  10 mEq/100 mL IVPB 10 milliEquivalent(s) IV Intermittent every 1 hour  sertraline 100 milliGRAM(s) Oral daily  vancomycin  IVPB        MEDICATIONS  (PRN):  acetaminophen     Tablet .. 650 milliGRAM(s) Oral every 6 hours PRN Temp greater or equal to 38C (100.4F), Mild Pain (1 - 3)  ALBUTerol    0.083% 2.5 milliGRAM(s) Nebulizer every 8 hours PRN Shortness of Breath and/or Wheezing  dextrose Oral Gel 15 Gram(s) Oral once PRN Blood Glucose LESS THAN 70 milliGRAM(s)/deciliter  hydrALAZINE Injectable 5 milliGRAM(s) IV Push every 6 hours PRN SBP >160  melatonin 3 milliGRAM(s) Oral at bedtime PRN Insomnia  ondansetron Injectable 4 milliGRAM(s) IV Push every 6 hours PRN Nausea and/or Vomiting

## 2022-07-11 NOTE — CHART NOTE - NSCHARTNOTEFT_GEN_A_CORE
Chart reviewed. Patient is downgraded to telemetry service. No active critical care needs identified at present time. Will remain available to assist as needed.

## 2022-07-11 NOTE — DIETITIAN INITIAL EVALUATION ADULT - PERTINENT LABORATORY DATA
07-11    148<H>  |  110<H>  |  15  ----------------------------<  98  2.3<LL>   |  27  |  0.71    Ca    9.7      11 Jul 2022 06:51  Phos  1.9     07-11  Mg     1.7     07-11    TPro  6.3  /  Alb  2.7<L>  /  TBili  1.8<H>  /  DBili  0.5<H>  /  AST  17  /  ALT  <10<L>  /  AlkPhos  90  07-11  POCT Blood Glucose.: 146 mg/dL (07-11-22 @ 05:08)  A1C with Estimated Average Glucose Result: 5.1 % (07-10-22 @ 05:59)

## 2022-07-12 LAB
ALBUMIN SERPL ELPH-MCNC: 2.3 G/DL — LOW (ref 3.3–5)
ALBUMIN SERPL ELPH-MCNC: 2.5 G/DL — LOW (ref 3.3–5)
ALP SERPL-CCNC: 85 U/L — SIGNIFICANT CHANGE UP (ref 30–120)
ALP SERPL-CCNC: 91 U/L — SIGNIFICANT CHANGE UP (ref 30–120)
ALT FLD-CCNC: <10 U/L DA — LOW (ref 10–60)
ALT FLD-CCNC: <10 U/L DA — LOW (ref 10–60)
ANION GAP SERPL CALC-SCNC: 9 MMOL/L — SIGNIFICANT CHANGE UP (ref 5–17)
AST SERPL-CCNC: 12 U/L — SIGNIFICANT CHANGE UP (ref 10–40)
AST SERPL-CCNC: 14 U/L — SIGNIFICANT CHANGE UP (ref 10–40)
BILIRUB DIRECT SERPL-MCNC: 0.3 MG/DL — SIGNIFICANT CHANGE UP (ref 0–0.3)
BILIRUB INDIRECT FLD-MCNC: 1 MG/DL — SIGNIFICANT CHANGE UP (ref 0.2–1)
BILIRUB SERPL-MCNC: 1.2 MG/DL — SIGNIFICANT CHANGE UP (ref 0.2–1.2)
BILIRUB SERPL-MCNC: 1.3 MG/DL — HIGH (ref 0.2–1.2)
BUN SERPL-MCNC: 13 MG/DL — SIGNIFICANT CHANGE UP (ref 7–23)
CALCIUM SERPL-MCNC: 9.4 MG/DL — SIGNIFICANT CHANGE UP (ref 8.4–10.5)
CHLORIDE SERPL-SCNC: 107 MMOL/L — SIGNIFICANT CHANGE UP (ref 96–108)
CO2 SERPL-SCNC: 27 MMOL/L — SIGNIFICANT CHANGE UP (ref 22–31)
CREAT SERPL-MCNC: 0.69 MG/DL — SIGNIFICANT CHANGE UP (ref 0.5–1.3)
CULTURE RESULTS: SIGNIFICANT CHANGE UP
EGFR: 84 ML/MIN/1.73M2 — SIGNIFICANT CHANGE UP
GLUCOSE SERPL-MCNC: 126 MG/DL — HIGH (ref 70–99)
HCT VFR BLD CALC: 33.7 % — LOW (ref 34.5–45)
HGB BLD-MCNC: 11.2 G/DL — LOW (ref 11.5–15.5)
INR BLD: 1.33 RATIO — HIGH (ref 0.88–1.16)
MAGNESIUM SERPL-MCNC: 2 MG/DL — SIGNIFICANT CHANGE UP (ref 1.6–2.6)
MCHC RBC-ENTMCNC: 27.6 PG — SIGNIFICANT CHANGE UP (ref 27–34)
MCHC RBC-ENTMCNC: 33.2 GM/DL — SIGNIFICANT CHANGE UP (ref 32–36)
MCV RBC AUTO: 83 FL — SIGNIFICANT CHANGE UP (ref 80–100)
MRSA PCR RESULT.: SIGNIFICANT CHANGE UP
NRBC # BLD: 0 /100 WBCS — SIGNIFICANT CHANGE UP (ref 0–0)
ORGANISM # SPEC MICROSCOPIC CNT: SIGNIFICANT CHANGE UP
ORGANISM # SPEC MICROSCOPIC CNT: SIGNIFICANT CHANGE UP
PLATELET # BLD AUTO: 211 K/UL — SIGNIFICANT CHANGE UP (ref 150–400)
POTASSIUM SERPL-MCNC: 3.4 MMOL/L — LOW (ref 3.5–5.3)
POTASSIUM SERPL-SCNC: 3.4 MMOL/L — LOW (ref 3.5–5.3)
PROT SERPL-MCNC: 5.9 G/DL — LOW (ref 6–8.3)
PROT SERPL-MCNC: 6 G/DL — SIGNIFICANT CHANGE UP (ref 6–8.3)
PROTHROM AB SERPL-ACNC: 15.3 SEC — HIGH (ref 10.5–13.4)
RBC # BLD: 4.06 M/UL — SIGNIFICANT CHANGE UP (ref 3.8–5.2)
RBC # FLD: 13.8 % — SIGNIFICANT CHANGE UP (ref 10.3–14.5)
S AUREUS DNA NOSE QL NAA+PROBE: SIGNIFICANT CHANGE UP
SARS-COV-2 RNA SPEC QL NAA+PROBE: DETECTED
SODIUM SERPL-SCNC: 143 MMOL/L — SIGNIFICANT CHANGE UP (ref 135–145)
SPECIMEN SOURCE: SIGNIFICANT CHANGE UP
WBC # BLD: 17.09 K/UL — HIGH (ref 3.8–10.5)
WBC # FLD AUTO: 17.09 K/UL — HIGH (ref 3.8–10.5)

## 2022-07-12 PROCEDURE — 99232 SBSQ HOSP IP/OBS MODERATE 35: CPT

## 2022-07-12 PROCEDURE — 71045 X-RAY EXAM CHEST 1 VIEW: CPT | Mod: 26

## 2022-07-12 PROCEDURE — 74230 X-RAY XM SWLNG FUNCJ C+: CPT | Mod: 26

## 2022-07-12 PROCEDURE — 99221 1ST HOSP IP/OBS SF/LOW 40: CPT

## 2022-07-12 RX ORDER — POTASSIUM CHLORIDE 20 MEQ
40 PACKET (EA) ORAL ONCE
Refills: 0 | Status: COMPLETED | OUTPATIENT
Start: 2022-07-12 | End: 2022-07-12

## 2022-07-12 RX ADMIN — AMLODIPINE BESYLATE 5 MILLIGRAM(S): 2.5 TABLET ORAL at 05:30

## 2022-07-12 RX ADMIN — ENOXAPARIN SODIUM 40 MILLIGRAM(S): 100 INJECTION SUBCUTANEOUS at 05:31

## 2022-07-12 RX ADMIN — MEMANTINE HYDROCHLORIDE 10 MILLIGRAM(S): 10 TABLET ORAL at 16:54

## 2022-07-12 RX ADMIN — Medication 6 MILLIGRAM(S): at 05:30

## 2022-07-12 RX ADMIN — PIPERACILLIN AND TAZOBACTAM 25 GRAM(S): 4; .5 INJECTION, POWDER, LYOPHILIZED, FOR SOLUTION INTRAVENOUS at 14:45

## 2022-07-12 RX ADMIN — ATORVASTATIN CALCIUM 20 MILLIGRAM(S): 80 TABLET, FILM COATED ORAL at 22:36

## 2022-07-12 RX ADMIN — PIPERACILLIN AND TAZOBACTAM 25 GRAM(S): 4; .5 INJECTION, POWDER, LYOPHILIZED, FOR SOLUTION INTRAVENOUS at 23:01

## 2022-07-12 RX ADMIN — SERTRALINE 100 MILLIGRAM(S): 25 TABLET, FILM COATED ORAL at 12:14

## 2022-07-12 RX ADMIN — PIPERACILLIN AND TAZOBACTAM 25 GRAM(S): 4; .5 INJECTION, POWDER, LYOPHILIZED, FOR SOLUTION INTRAVENOUS at 05:31

## 2022-07-12 RX ADMIN — Medication 2: at 12:14

## 2022-07-12 RX ADMIN — CARBIDOPA AND LEVODOPA 1 TABLET(S): 25; 100 TABLET ORAL at 22:36

## 2022-07-12 RX ADMIN — CARBIDOPA AND LEVODOPA 1 TABLET(S): 25; 100 TABLET ORAL at 05:30

## 2022-07-12 RX ADMIN — SODIUM CHLORIDE 100 MILLILITER(S): 9 INJECTION, SOLUTION INTRAVENOUS at 18:06

## 2022-07-12 RX ADMIN — DONEPEZIL HYDROCHLORIDE 5 MILLIGRAM(S): 10 TABLET, FILM COATED ORAL at 12:13

## 2022-07-12 RX ADMIN — CARBIDOPA AND LEVODOPA 1 TABLET(S): 25; 100 TABLET ORAL at 14:43

## 2022-07-12 RX ADMIN — MEMANTINE HYDROCHLORIDE 10 MILLIGRAM(S): 10 TABLET ORAL at 05:30

## 2022-07-12 RX ADMIN — Medication 3 MILLIGRAM(S): at 22:36

## 2022-07-12 RX ADMIN — Medication 81 MILLIGRAM(S): at 12:14

## 2022-07-12 RX ADMIN — Medication 40 MILLIEQUIVALENT(S): at 16:54

## 2022-07-12 NOTE — SWALLOW VFSS/MBS ASSESSMENT ADULT - ORAL PHASE
Delayed oral transit time/Reduced anterior - posterior transport Delayed oral transit time/Reduced anterior - posterior transport/Incomplete tongue to palate contact/Uncontrolled bolus / spillover in hypopharynx Delayed oral transit time/Reduced anterior - posterior transport/Incomplete tongue to palate contact/Uncontrolled bolus / spillover in hypopharynx/Laryngeal penetration before swallow - silent bolus hold/Delayed oral transit time/Reduced anterior - posterior transport/Uncontrolled bolus / spillover in joselyn-pharynx

## 2022-07-12 NOTE — SWALLOW VFSS/MBS ASSESSMENT ADULT - SLP PERTINENT HISTORY OF CURRENT PROBLEM
Per charting, "88 y/o F with PMH of HTN, Pre-DM, Dyslipidemia, AS, Parkinson's Disease, Alzheimer Dementia, Anxiety, and Depression who was sent from her assisted living facility for respiratory distress & AMS, was reported to have thick secretions in her throat with dropping of her SPO2 to the low 70's, improved with suctioning & oxygen supplementation to upper 80's & low 90's, no reported fever. Patient was tested positive for COVID-19 about 2 weeks ago. At the ED her CTA showed no PE to the level of lobes, but showed evidence suggestive of aspiration PNA. Patient is non verbal, no further history could be obtained at this time."

## 2022-07-12 NOTE — PROGRESS NOTE ADULT - PROBLEM SELECTOR PLAN 6
currently on Zosyn, trend TLC and temp, f/u culture results.
with mild hypernatremia, IVF management as stated above.
with mild hypernatremia, IVF management as stated above.

## 2022-07-12 NOTE — PROGRESS NOTE ADULT - PROBLEM SELECTOR PLAN 8
patient with history of pre-DM, random plasma glucose on presentation was 215 mg/dl possibly 2ry to stress hormones surge, was started on corrective insulin Lispro scale coverage Q 6h till PO resumed, check glycohemoglobin level in am.
patient with history of pre-DM, random plasma glucose on presentation was 215 mg/dl possibly 2ry to stress hormones surge, was started on corrective insulin Lispro scale coverage Q 6h till PO resumed, check glycohemoglobin level in am.
was started on LMWH Enoxaparin 40 mg sub Q daily for DVT prophylaxis, also Protonix 40 mg PO daily for GI prophylaxis (formulary for Omeprazole), ordered speciality bed low air loss mattress for her decubitus ulcer, wound care consult in am.

## 2022-07-12 NOTE — PROGRESS NOTE ADULT - PROBLEM SELECTOR PLAN 1
continue on iv zosyn/ vanco per crcl   wbc trending upwards from 14 K to 17 K  suspect from steroid on dexamethasone   dysphagia diet  speech and swallow eval appreicated  schedule for modified barium swallow   remains tachypnia  will order chest xray   continue on vanco and zosyn per crcl

## 2022-07-12 NOTE — PROGRESS NOTE ADULT - PROBLEM SELECTOR PLAN 5
K 2.4  IV kcl  and oral kcl supplement  repeat bmp at 1 pm
supplemented  resolved
with mild hypernatremia, IVF management as stated above.

## 2022-07-12 NOTE — CONSULT NOTE ADULT - SUBJECTIVE AND OBJECTIVE BOX
HPI:  This is an 86 y/o F with PMH of HTN, Pre-DM, Dyslipidemia, AS, Parkinson's Disease, Alzheimer Dementia, Anxiety, and Depression who was sent from her assisted living facility for respiratory distress & AMS, was reported to have thick secretions in her throat with dropping of her SPO2 to the low 70's, improved with suctioning & oxygen supplementation to upper 80's & low 90's, no reported fever. Patient was tested positive for COVID-19 about 2 weeks ago. At the ED her CTA showed no PE to the level of lobes, but showed evidence suggestive of aspiration PNA. Patient is non verbal, no further history could be obtained at this time.  (09 Jul 2022 22:39)      PAST MEDICAL & SURGICAL HISTORY:  Cholesterolosis      Alzheimers dementia without behavioral disturbance, unspecified timing of dementia onset      Depression, unspecified depression type      Parkinsonism, unspecified Parkinsonism type      Falls frequently      REVIEW OF SYSTEMS  Patient is unable to provide any information/ROS  due to baseline mental status      MEDICATIONS  (STANDING):  amLODIPine   Tablet 5 milliGRAM(s) Oral daily  aspirin enteric coated 81 milliGRAM(s) Oral daily  atorvastatin 20 milliGRAM(s) Oral at bedtime  carbidopa/levodopa  25/100 1 Tablet(s) Oral three times a day  dexAMETHasone  Injectable 6 milliGRAM(s) IV Push daily  dextrose 5%. 1000 milliLiter(s) (50 mL/Hr) IV Continuous <Continuous>  dextrose 5%. 1000 milliLiter(s) (100 mL/Hr) IV Continuous <Continuous>  dextrose 50% Injectable 25 Gram(s) IV Push once  dextrose 50% Injectable 12.5 Gram(s) IV Push once  dextrose 50% Injectable 25 Gram(s) IV Push once  donepezil 5 milliGRAM(s) Oral daily  enoxaparin Injectable 40 milliGRAM(s) SubCutaneous every 24 hours  glucagon  Injectable 1 milliGRAM(s) IntraMuscular once  insulin lispro (ADMELOG) corrective regimen sliding scale   SubCutaneous Before meals and at bedtime  lactated ringers. 1000 milliLiter(s) (100 mL/Hr) IV Continuous <Continuous>  memantine 10 milliGRAM(s) Oral two times a day  piperacillin/tazobactam IVPB.. 3.375 Gram(s) IV Intermittent every 8 hours  sertraline 100 milliGRAM(s) Oral daily    MEDICATIONS  (PRN):  acetaminophen     Tablet .. 650 milliGRAM(s) Oral every 6 hours PRN Temp greater or equal to 38C (100.4F), Mild Pain (1 - 3)  ALBUTerol    0.083% 2.5 milliGRAM(s) Nebulizer every 8 hours PRN Shortness of Breath and/or Wheezing  dextrose Oral Gel 15 Gram(s) Oral once PRN Blood Glucose LESS THAN 70 milliGRAM(s)/deciliter  hydrALAZINE Injectable 5 milliGRAM(s) IV Push every 6 hours PRN SBP >160  melatonin 3 milliGRAM(s) Oral at bedtime PRN Insomnia  ondansetron Injectable 4 milliGRAM(s) IV Push every 6 hours PRN Nausea and/or Vomiting      Allergies    No Known Allergies    Intolerances        SOCIAL HISTORY: Lives in [facility]    FAMILY HISTORY:  No pertinent family history in first degree relatives        Vital Signs Last 24 Hrs  T(C): 36.6 (12 Jul 2022 12:00), Max: 37.1 (12 Jul 2022 08:00)  T(F): 97.9 (12 Jul 2022 12:00), Max: 98.7 (12 Jul 2022 08:00)  HR: 92 (12 Jul 2022 12:00) (91 - 109)  BP: 116/71 (12 Jul 2022 12:00) (94/80 - 169/74)  BP(mean): 84 (12 Jul 2022 12:00) (68 - 95)  RR: 27 (12 Jul 2022 12:00) (24 - 40)  SpO2: 100% (12 Jul 2022 12:00) (92% - 100%)    Parameters below as of 12 Jul 2022 12:00  Patient On (Oxygen Delivery Method): nasal cannula  O2 Flow (L/min): 1                              11.2   17.09 )-----------( 211      ( 12 Jul 2022 06:00 )             33.7     07-12    143  |  107  |  13  ----------------------------<  126<H>  3.4<L>   |  27  |  0.69    Ca    9.4      12 Jul 2022 06:00  Phos  1.9     07-11  Mg     2.0     07-12    TPro  6.0  /  Alb  2.3<L>  /  TBili  1.2  /  DBili  0.3  /  AST  12  /  ALT  <10<L>  /  AlkPhos  91  07-12      A1C with Estimated Average Glucose Result: 5.1 % (07-10-22 @ 05:59)      PHYSICAL EXAM:    General: NAD  ENT: no nasal discharge;  Neck: Supple  Extremities:  no gross deformities  Dermatologic: Sacral region, intergluteal fold moisture damage  Neurologic: Can not follow commands  Musculoskeletal/Vascular: no deformities/ contractures

## 2022-07-12 NOTE — SWALLOW VFSS/MBS ASSESSMENT ADULT - RECOMMENDED CONSISTENCY
1. Puree with mildly thick liquids.  2. All liquids administered via teaspoon to ensure small bolus volume and assist with lingual control.  3. Strict aspiration precautions.  4. 1:1 supervision/assistance during PO intake 2/2 AMS.  5. Safe swallowing guidelines: feed only when fully awake/attentive to PO, all PO via teaspoon, check oral cavity to ensure clearance post swallow, alternate bite/sip, pace meal slowly, remain upright ~30 minutes post meal.  6. Crush medications in applesauce, as feasible.  7. Ongoing oral care.  8. Trial swallowing therapy to maximize oropharyngeal swallow mechanism.  9. May monitor for solid advancement at bedside, per treating SLP discretion. IMPRESSIONS CONTINUED: mild residue at the level of the BOT, valleculae, PPW, and pyriforms post swallow. Pt maintained airway protection post swallow.  5. Mild pharyngeal dysphagia when given moderately thick liquids, puree, and regular solids marked by delayed pharyngeal swallow trigger with mod thick and solids at the level of the valleculae (timely with puree), reduced BOT retraction, adequate hyolaryngeal elevation/excursion, and complete epiglottic retroflexion. There was no penetration and/or aspiration pre/during/post swallow. There was trace BOT and PPW residue and trace to mild valleculae residue post swallow. Pt maintained airway protection post swallow.    RECOMMENDATIONS:  1. Puree with mildly thick liquids.  2. All liquids administered via teaspoon to ensure small bolus volume and assist with lingual control.  3. Strict aspiration precautions.  4. 1:1 supervision/assistance during PO intake 2/2 AMS.  5. Safe swallowing guidelines: feed only when fully awake/attentive to PO, all PO via teaspoon, check oral cavity to ensure clearance post swallow, alternate bite/sip, pace meal slowly, remain upright ~30 minutes post meal.  6. Crush medications in applesauce, as feasible.  7. Ongoing oral care.  8. Trial swallowing therapy to maximize oropharyngeal swallow mechanism.  9. May monitor for solid advancement at bedside, per treating SLP discretion.

## 2022-07-12 NOTE — PROGRESS NOTE ADULT - PROBLEM SELECTOR PLAN 7
patient with history of pre-DM, random plasma glucose on presentation was 215 mg/dl possibly 2ry to stress hormones surge, was started on corrective insulin Lispro scale coverage Q 6h till PO resumed, check glycohemoglobin level in am.
currently on Zosyn, trend TLC and temp, f/u culture results.
currently on Zosyn, trend TLC and temp, f/u culture results.

## 2022-07-12 NOTE — PROGRESS NOTE ADULT - PROBLEM SELECTOR PLAN 2
diagnosed about 2 weeks ago, fully vaccinated but no boosters as per records, contact & airborne precautions,  d/c remsidvir after 13 days , little to no benefits  02 sat 94% on RA  repeat chest xray a
diagnosed about 2 weeks ago, fully vaccinated but no boosters as per records, contact & airborne precautions,  d/c remsidvir after 13 days , little to no benefits  02 sat 94% on RA
diagnosed about 2 weeks ago, fully vaccinated but no boosters as per records, contact & airborne precautions,  d/c remsidvir after 13 days , little to no benefits  02 sat 94% on RA

## 2022-07-12 NOTE — PROGRESS NOTE ADULT - PROBLEM SELECTOR PLAN 3
ML related to the above, currently sating 100% on HF nasal canula, titrate oxygen to keep SPO2 >94%,  tapered off nasal canula now on RA 95%
ML related to the above, currently sating 100% on HF nasal canula, titrate oxygen to keep SPO2 >94%, pulmonary consult with Dr. Durham was called.
ML related to the above, currently sating 100% on HF nasal canula, titrate oxygen to keep SPO2 >94%,  tapered off nasal canula now on RA 95%

## 2022-07-12 NOTE — SWALLOW VFSS/MBS ASSESSMENT ADULT - DIAGNOSTIC IMPRESSIONS
1. Moderate oral dysphagia when given regular solids marked by reduced utensil stripping, adequate containment, prolonged mastication, bolus hold, prolonged/reduced bolus cohesion resulting in uncontrolled spillage to oropharynx, reduced posterior transfer requiring verbal cue for initiation, and trace residue post swallow on hard and soft palate.  2. Mild to moderate oral dysphagia when given puree, moderately thick liquids, mildly thick liquids, and thin liquids marked by reduced utensil stripping, trace anterior spillage with liquids, reduced bolus cohesion with liquids resulting in premature spillage to hypopharynx (*with deep/SILENT penetration BEFORE the swallow when given thin liquids), reduced posterior transfer, and grossly adequate clearance post swallow.  3. Moderate pharyngeal dysphagia when given thin liquids marked by deep/SILENT penetration to the level of the VFs BEFORE the swallow, delayed pharyngeal swallow trigger at the level of the pyriforms, reduced BOT retraction, reduced hyolaryngeal elevation/excursion, and incomplete epiglottic retroflexion. As stated, there was silent penetration before the swallow, which did not clear upon completion of swallow, resulting in trace to mild residue in laryngeal vestibule post swallow. Pt was unable to follow commands for purposes of compensatory strategies. There was trace to mild BOT, PPW, and pyriform residue and mild valleculae residue post swallow. Pt was unable to follow command for volitional swallow in order to reduce pharyngeal residue; therefore, there was spillage of pharyngeal residue into laryngeal vestibule, resulting in SILENT penetration POST swallow.  4. Mild to moderate pharyngeal dysphagia when given mildly thick liquids marked by delayed pharyngeal swallow trigger at the level of the valleculae, reduced BOT retraction, reduced hyolaryngeal elevation/excursion, and incomplete epiglottic retroflexion. There was flash penetration during the swallow with complete retrieval. There was trace to

## 2022-07-12 NOTE — SWALLOW VFSS/MBS ASSESSMENT ADULT - RECOMMENDED FEEDING/EATING TECHNIQUES
allow for swallow between intakes/alternate food with liquid/check mouth frequently for oral residue/pocketing/crush medication (when feasible)/maintain upright posture during/after eating for 30 mins/no straws/oral hygiene/position upright (90 degrees)/provide rest periods between swallows/small sips/bites

## 2022-07-12 NOTE — PROGRESS NOTE ADULT - PROBLEM SELECTOR PLAN 9
was started on LMWH Enoxaparin 40 mg sub Q daily for DVT prophylaxis, also Protonix 40 mg PO daily for GI prophylaxis (formulary for Omeprazole), ordered speciality bed low air loss mattress for her decubitus ulcer, wound care consult in am.
was started on LMWH Enoxaparin 40 mg sub Q daily for DVT prophylaxis, also Protonix 40 mg PO daily for GI prophylaxis (formulary for Omeprazole), ordered speciality bed low air loss mattress for her decubitus ulcer, wound care consult in am.

## 2022-07-12 NOTE — CONSULT NOTE ADULT - ASSESSMENT
Patient presents with moisture damage intergluteal ford, sacral region: periwound dry no erythema  Recommendation   Triad BID and PRN  Patient is on a low air loss mattress  for the critically ill patient experiencing multiorgan failure, impaired tissue oxygenation, and perfusion can contribute to skin failure thus the development of a pressure related injury may be unavoidable    At risk for altered tissue perfusion /YES  Impaired perfusion of peripheral tissue /YES  Continue  Nutrition (as tolerated)  Continue  Offloading   Continue Pericare  Apply cair boots at all times while in bed.   Provide skin checks and foot placement q8h.  Care as per medicine will follow w/ you  Follow up as outpatient at [facility]   Findings and recommendations discussed with ESTIVEN Colby  Thank you for this consult  Cheryle Roman NP, [facility] [phone]

## 2022-07-12 NOTE — SWALLOW VFSS/MBS ASSESSMENT ADULT - COMMENTS
Clinical swallow assessment completed 7/11, at which time pt was recommended puree with thin liquids. MBS was recommended to r/o silent aspiration given Parkinson's Disease and concern for aspiration PNA.    CT chest 7/9: "Findings throughout all 5 lobes favored to represent multifocal aspiration given the dilated fluid-filled esophagus and degree of   endobronchial debris/secretions."

## 2022-07-12 NOTE — PROGRESS NOTE ADULT - PROBLEM SELECTOR PLAN 4
ML related to aspiration PNA & UTI, lactic acid downtrending after IVF bolus,   continue on vanco and zosyn  id eval
ML related to aspiration PNA & UTI, lactic acid downtrending after IVF bolus, maintain at 100 ml/h of LR, monitor I & O, trend lactate level, in addition to the above management.
ML related to aspiration PNA & UTI, lactic acid downtrending after IVF bolus,   continue on vanco and zosyn  id eval

## 2022-07-13 LAB
ALBUMIN SERPL ELPH-MCNC: 2.3 G/DL — LOW (ref 3.3–5)
ALBUMIN SERPL ELPH-MCNC: 2.4 G/DL — LOW (ref 3.3–5)
ALP SERPL-CCNC: 81 U/L — SIGNIFICANT CHANGE UP (ref 30–120)
ALP SERPL-CCNC: 84 U/L — SIGNIFICANT CHANGE UP (ref 30–120)
ALT FLD-CCNC: <10 U/L DA — LOW (ref 10–60)
ALT FLD-CCNC: <10 U/L DA — LOW (ref 10–60)
ANION GAP SERPL CALC-SCNC: 5 MMOL/L — SIGNIFICANT CHANGE UP (ref 5–17)
AST SERPL-CCNC: 13 U/L — SIGNIFICANT CHANGE UP (ref 10–40)
AST SERPL-CCNC: 14 U/L — SIGNIFICANT CHANGE UP (ref 10–40)
BILIRUB DIRECT SERPL-MCNC: 0.2 MG/DL — SIGNIFICANT CHANGE UP (ref 0–0.3)
BILIRUB INDIRECT FLD-MCNC: 0.8 MG/DL — SIGNIFICANT CHANGE UP (ref 0.2–1)
BILIRUB SERPL-MCNC: 0.9 MG/DL — SIGNIFICANT CHANGE UP (ref 0.2–1.2)
BILIRUB SERPL-MCNC: 1 MG/DL — SIGNIFICANT CHANGE UP (ref 0.2–1.2)
BUN SERPL-MCNC: 13 MG/DL — SIGNIFICANT CHANGE UP (ref 7–23)
CALCIUM SERPL-MCNC: 9.7 MG/DL — SIGNIFICANT CHANGE UP (ref 8.4–10.5)
CHLORIDE SERPL-SCNC: 108 MMOL/L — SIGNIFICANT CHANGE UP (ref 96–108)
CO2 SERPL-SCNC: 29 MMOL/L — SIGNIFICANT CHANGE UP (ref 22–31)
CREAT SERPL-MCNC: 0.72 MG/DL — SIGNIFICANT CHANGE UP (ref 0.5–1.3)
EGFR: 81 ML/MIN/1.73M2 — SIGNIFICANT CHANGE UP
GLUCOSE SERPL-MCNC: 105 MG/DL — HIGH (ref 70–99)
HCT VFR BLD CALC: 33 % — LOW (ref 34.5–45)
HGB BLD-MCNC: 10.9 G/DL — LOW (ref 11.5–15.5)
INR BLD: 1.14 RATIO — SIGNIFICANT CHANGE UP (ref 0.88–1.16)
MCHC RBC-ENTMCNC: 27.7 PG — SIGNIFICANT CHANGE UP (ref 27–34)
MCHC RBC-ENTMCNC: 33 GM/DL — SIGNIFICANT CHANGE UP (ref 32–36)
MCV RBC AUTO: 83.8 FL — SIGNIFICANT CHANGE UP (ref 80–100)
NRBC # BLD: 0 /100 WBCS — SIGNIFICANT CHANGE UP (ref 0–0)
PLATELET # BLD AUTO: 207 K/UL — SIGNIFICANT CHANGE UP (ref 150–400)
POTASSIUM SERPL-MCNC: 3.7 MMOL/L — SIGNIFICANT CHANGE UP (ref 3.5–5.3)
POTASSIUM SERPL-SCNC: 3.7 MMOL/L — SIGNIFICANT CHANGE UP (ref 3.5–5.3)
PROT SERPL-MCNC: 5.8 G/DL — LOW (ref 6–8.3)
PROT SERPL-MCNC: 6.1 G/DL — SIGNIFICANT CHANGE UP (ref 6–8.3)
PROTHROM AB SERPL-ACNC: 13.1 SEC — SIGNIFICANT CHANGE UP (ref 10.5–13.4)
RBC # BLD: 3.94 M/UL — SIGNIFICANT CHANGE UP (ref 3.8–5.2)
RBC # FLD: 13.6 % — SIGNIFICANT CHANGE UP (ref 10.3–14.5)
SODIUM SERPL-SCNC: 142 MMOL/L — SIGNIFICANT CHANGE UP (ref 135–145)
WBC # BLD: 12.56 K/UL — HIGH (ref 3.8–10.5)
WBC # FLD AUTO: 12.56 K/UL — HIGH (ref 3.8–10.5)

## 2022-07-13 PROCEDURE — 71045 X-RAY EXAM CHEST 1 VIEW: CPT | Mod: 26

## 2022-07-13 PROCEDURE — 99232 SBSQ HOSP IP/OBS MODERATE 35: CPT

## 2022-07-13 RX ORDER — FUROSEMIDE 40 MG
40 TABLET ORAL ONCE
Refills: 0 | Status: COMPLETED | OUTPATIENT
Start: 2022-07-13 | End: 2022-07-13

## 2022-07-13 RX ADMIN — Medication 5 MILLIGRAM(S): at 08:46

## 2022-07-13 RX ADMIN — Medication 6 MILLIGRAM(S): at 05:06

## 2022-07-13 RX ADMIN — Medication 40 MILLIGRAM(S): at 10:03

## 2022-07-13 RX ADMIN — ENOXAPARIN SODIUM 40 MILLIGRAM(S): 100 INJECTION SUBCUTANEOUS at 05:07

## 2022-07-13 RX ADMIN — PIPERACILLIN AND TAZOBACTAM 25 GRAM(S): 4; .5 INJECTION, POWDER, LYOPHILIZED, FOR SOLUTION INTRAVENOUS at 05:50

## 2022-07-13 RX ADMIN — CARBIDOPA AND LEVODOPA 1 TABLET(S): 25; 100 TABLET ORAL at 05:06

## 2022-07-13 RX ADMIN — SODIUM CHLORIDE 100 MILLILITER(S): 9 INJECTION, SOLUTION INTRAVENOUS at 04:13

## 2022-07-13 RX ADMIN — PIPERACILLIN AND TAZOBACTAM 25 GRAM(S): 4; .5 INJECTION, POWDER, LYOPHILIZED, FOR SOLUTION INTRAVENOUS at 15:52

## 2022-07-13 RX ADMIN — PIPERACILLIN AND TAZOBACTAM 25 GRAM(S): 4; .5 INJECTION, POWDER, LYOPHILIZED, FOR SOLUTION INTRAVENOUS at 23:11

## 2022-07-14 ENCOUNTER — TRANSCRIPTION ENCOUNTER (OUTPATIENT)
Age: 87
End: 2022-07-14

## 2022-07-14 VITALS
SYSTOLIC BLOOD PRESSURE: 151 MMHG | DIASTOLIC BLOOD PRESSURE: 53 MMHG | OXYGEN SATURATION: 97 % | HEART RATE: 80 BPM | RESPIRATION RATE: 20 BRPM

## 2022-07-14 LAB
ALBUMIN SERPL ELPH-MCNC: 2.4 G/DL — LOW (ref 3.3–5)
ALP SERPL-CCNC: 88 U/L — SIGNIFICANT CHANGE UP (ref 30–120)
ALT FLD-CCNC: 12 U/L DA — SIGNIFICANT CHANGE UP (ref 10–60)
ANION GAP SERPL CALC-SCNC: 9 MMOL/L — SIGNIFICANT CHANGE UP (ref 5–17)
AST SERPL-CCNC: 15 U/L — SIGNIFICANT CHANGE UP (ref 10–40)
BILIRUB DIRECT SERPL-MCNC: 0.3 MG/DL — SIGNIFICANT CHANGE UP (ref 0–0.3)
BILIRUB INDIRECT FLD-MCNC: 1.1 MG/DL — HIGH (ref 0.2–1)
BILIRUB SERPL-MCNC: 1.4 MG/DL — HIGH (ref 0.2–1.2)
BUN SERPL-MCNC: 11 MG/DL — SIGNIFICANT CHANGE UP (ref 7–23)
CALCIUM SERPL-MCNC: 9.3 MG/DL — SIGNIFICANT CHANGE UP (ref 8.4–10.5)
CHLORIDE SERPL-SCNC: 103 MMOL/L — SIGNIFICANT CHANGE UP (ref 96–108)
CO2 SERPL-SCNC: 29 MMOL/L — SIGNIFICANT CHANGE UP (ref 22–31)
CREAT SERPL-MCNC: 0.83 MG/DL — SIGNIFICANT CHANGE UP (ref 0.5–1.3)
EGFR: 69 ML/MIN/1.73M2 — SIGNIFICANT CHANGE UP
GLUCOSE SERPL-MCNC: 99 MG/DL — SIGNIFICANT CHANGE UP (ref 70–99)
HCT VFR BLD CALC: 34.4 % — LOW (ref 34.5–45)
HGB BLD-MCNC: 11.4 G/DL — LOW (ref 11.5–15.5)
INR BLD: 1.3 RATIO — HIGH (ref 0.88–1.16)
MAGNESIUM SERPL-MCNC: 1.9 MG/DL — SIGNIFICANT CHANGE UP (ref 1.6–2.6)
MCHC RBC-ENTMCNC: 28 PG — SIGNIFICANT CHANGE UP (ref 27–34)
MCHC RBC-ENTMCNC: 33.1 GM/DL — SIGNIFICANT CHANGE UP (ref 32–36)
MCV RBC AUTO: 84.5 FL — SIGNIFICANT CHANGE UP (ref 80–100)
NRBC # BLD: 0 /100 WBCS — SIGNIFICANT CHANGE UP (ref 0–0)
PLATELET # BLD AUTO: 187 K/UL — SIGNIFICANT CHANGE UP (ref 150–400)
POTASSIUM SERPL-MCNC: 2.9 MMOL/L — CRITICAL LOW (ref 3.5–5.3)
POTASSIUM SERPL-SCNC: 2.9 MMOL/L — CRITICAL LOW (ref 3.5–5.3)
PROT SERPL-MCNC: 6.3 G/DL — SIGNIFICANT CHANGE UP (ref 6–8.3)
PROTHROM AB SERPL-ACNC: 15.4 SEC — HIGH (ref 10.5–13.4)
RBC # BLD: 4.07 M/UL — SIGNIFICANT CHANGE UP (ref 3.8–5.2)
RBC # FLD: 13.5 % — SIGNIFICANT CHANGE UP (ref 10.3–14.5)
SODIUM SERPL-SCNC: 141 MMOL/L — SIGNIFICANT CHANGE UP (ref 135–145)
WBC # BLD: 9.64 K/UL — SIGNIFICANT CHANGE UP (ref 3.8–10.5)
WBC # FLD AUTO: 9.64 K/UL — SIGNIFICANT CHANGE UP (ref 3.8–10.5)

## 2022-07-14 PROCEDURE — 99223 1ST HOSP IP/OBS HIGH 75: CPT

## 2022-07-14 PROCEDURE — 87150 DNA/RNA AMPLIFIED PROBE: CPT

## 2022-07-14 PROCEDURE — 96367 TX/PROPH/DG ADDL SEQ IV INF: CPT

## 2022-07-14 PROCEDURE — 71275 CT ANGIOGRAPHY CHEST: CPT | Mod: MA

## 2022-07-14 PROCEDURE — 80048 BASIC METABOLIC PNL TOTAL CA: CPT

## 2022-07-14 PROCEDURE — 82962 GLUCOSE BLOOD TEST: CPT

## 2022-07-14 PROCEDURE — 87641 MR-STAPH DNA AMP PROBE: CPT

## 2022-07-14 PROCEDURE — 83735 ASSAY OF MAGNESIUM: CPT

## 2022-07-14 PROCEDURE — 71045 X-RAY EXAM CHEST 1 VIEW: CPT

## 2022-07-14 PROCEDURE — 87640 STAPH A DNA AMP PROBE: CPT

## 2022-07-14 PROCEDURE — 96365 THER/PROPH/DIAG IV INF INIT: CPT

## 2022-07-14 PROCEDURE — 81001 URINALYSIS AUTO W/SCOPE: CPT

## 2022-07-14 PROCEDURE — 80076 HEPATIC FUNCTION PANEL: CPT

## 2022-07-14 PROCEDURE — 74230 X-RAY XM SWLNG FUNCJ C+: CPT

## 2022-07-14 PROCEDURE — 83605 ASSAY OF LACTIC ACID: CPT

## 2022-07-14 PROCEDURE — 80053 COMPREHEN METABOLIC PANEL: CPT

## 2022-07-14 PROCEDURE — 87077 CULTURE AEROBIC IDENTIFY: CPT

## 2022-07-14 PROCEDURE — 85730 THROMBOPLASTIN TIME PARTIAL: CPT

## 2022-07-14 PROCEDURE — 84145 PROCALCITONIN (PCT): CPT

## 2022-07-14 PROCEDURE — 96375 TX/PRO/DX INJ NEW DRUG ADDON: CPT

## 2022-07-14 PROCEDURE — 93005 ELECTROCARDIOGRAM TRACING: CPT

## 2022-07-14 PROCEDURE — 87040 BLOOD CULTURE FOR BACTERIA: CPT

## 2022-07-14 PROCEDURE — 83036 HEMOGLOBIN GLYCOSYLATED A1C: CPT

## 2022-07-14 PROCEDURE — 99497 ADVNCD CARE PLAN 30 MIN: CPT | Mod: 25

## 2022-07-14 PROCEDURE — 82803 BLOOD GASES ANY COMBINATION: CPT

## 2022-07-14 PROCEDURE — 82565 ASSAY OF CREATININE: CPT

## 2022-07-14 PROCEDURE — 84100 ASSAY OF PHOSPHORUS: CPT

## 2022-07-14 PROCEDURE — 82728 ASSAY OF FERRITIN: CPT

## 2022-07-14 PROCEDURE — 70450 CT HEAD/BRAIN W/O DYE: CPT | Mod: MA

## 2022-07-14 PROCEDURE — 92611 MOTION FLUOROSCOPY/SWALLOW: CPT

## 2022-07-14 PROCEDURE — 84132 ASSAY OF SERUM POTASSIUM: CPT

## 2022-07-14 PROCEDURE — 92526 ORAL FUNCTION THERAPY: CPT

## 2022-07-14 PROCEDURE — 85025 COMPLETE CBC W/AUTO DIFF WBC: CPT

## 2022-07-14 PROCEDURE — 87635 SARS-COV-2 COVID-19 AMP PRB: CPT

## 2022-07-14 PROCEDURE — 36415 COLL VENOUS BLD VENIPUNCTURE: CPT

## 2022-07-14 PROCEDURE — 85610 PROTHROMBIN TIME: CPT

## 2022-07-14 PROCEDURE — 87086 URINE CULTURE/COLONY COUNT: CPT

## 2022-07-14 PROCEDURE — 96368 THER/DIAG CONCURRENT INF: CPT

## 2022-07-14 PROCEDURE — 85027 COMPLETE CBC AUTOMATED: CPT

## 2022-07-14 PROCEDURE — 99285 EMERGENCY DEPT VISIT HI MDM: CPT

## 2022-07-14 RX ORDER — ROSUVASTATIN CALCIUM 5 MG/1
1 TABLET ORAL
Qty: 0 | Refills: 0 | DISCHARGE

## 2022-07-14 RX ORDER — MORPHINE SULFATE 50 MG/1
2 CAPSULE, EXTENDED RELEASE ORAL
Refills: 0 | Status: DISCONTINUED | OUTPATIENT
Start: 2022-07-14 | End: 2022-07-14

## 2022-07-14 RX ORDER — MORPHINE SULFATE 50 MG/1
1 CAPSULE, EXTENDED RELEASE ORAL EVERY 6 HOURS
Refills: 0 | Status: DISCONTINUED | OUTPATIENT
Start: 2022-07-14 | End: 2022-07-14

## 2022-07-14 RX ORDER — MORPHINE SULFATE 50 MG/1
1 CAPSULE, EXTENDED RELEASE ORAL
Refills: 0 | Status: DISCONTINUED | OUTPATIENT
Start: 2022-07-14 | End: 2022-07-14

## 2022-07-14 RX ORDER — CHOLECALCIFEROL (VITAMIN D3) 125 MCG
1 CAPSULE ORAL
Qty: 0 | Refills: 0 | DISCHARGE

## 2022-07-14 RX ORDER — CARBIDOPA AND LEVODOPA 25; 100 MG/1; MG/1
1 TABLET ORAL
Qty: 0 | Refills: 0 | DISCHARGE

## 2022-07-14 RX ORDER — MORPHINE SULFATE 50 MG/1
1 CAPSULE, EXTENDED RELEASE ORAL
Qty: 0 | Refills: 0 | DISCHARGE
Start: 2022-07-14

## 2022-07-14 RX ORDER — ASPIRIN/CALCIUM CARB/MAGNESIUM 324 MG
1 TABLET ORAL
Qty: 0 | Refills: 0 | DISCHARGE

## 2022-07-14 RX ORDER — DONEPEZIL HYDROCHLORIDE 10 MG/1
1 TABLET, FILM COATED ORAL
Qty: 0 | Refills: 0 | DISCHARGE

## 2022-07-14 RX ORDER — SERTRALINE 25 MG/1
1 TABLET, FILM COATED ORAL
Qty: 0 | Refills: 0 | DISCHARGE

## 2022-07-14 RX ORDER — POTASSIUM CHLORIDE 20 MEQ
10 PACKET (EA) ORAL
Refills: 0 | Status: COMPLETED | OUTPATIENT
Start: 2022-07-14 | End: 2022-07-14

## 2022-07-14 RX ADMIN — Medication 100 MILLIEQUIVALENT(S): at 09:27

## 2022-07-14 RX ADMIN — Medication 100 MILLIEQUIVALENT(S): at 10:32

## 2022-07-14 RX ADMIN — MORPHINE SULFATE 1 MILLIGRAM(S): 50 CAPSULE, EXTENDED RELEASE ORAL at 12:14

## 2022-07-14 RX ADMIN — Medication 6 MILLIGRAM(S): at 05:33

## 2022-07-14 RX ADMIN — ENOXAPARIN SODIUM 40 MILLIGRAM(S): 100 INJECTION SUBCUTANEOUS at 05:33

## 2022-07-14 RX ADMIN — Medication 100 MILLIEQUIVALENT(S): at 11:33

## 2022-07-14 RX ADMIN — MORPHINE SULFATE 1 MILLIGRAM(S): 50 CAPSULE, EXTENDED RELEASE ORAL at 17:55

## 2022-07-14 RX ADMIN — PIPERACILLIN AND TAZOBACTAM 25 GRAM(S): 4; .5 INJECTION, POWDER, LYOPHILIZED, FOR SOLUTION INTRAVENOUS at 07:27

## 2022-07-14 RX ADMIN — MORPHINE SULFATE 1 MILLIGRAM(S): 50 CAPSULE, EXTENDED RELEASE ORAL at 12:02

## 2022-07-14 NOTE — PROGRESS NOTE ADULT - PROVIDER SPECIALTY LIST ADULT
Infectious Disease
Infectious Disease
Hospitalist
Infectious Disease
Internal Medicine
Infectious Disease
Pulmonology
Critical Care
Pulmonology
Hospitalist

## 2022-07-14 NOTE — PROGRESS NOTE ADULT - REASON FOR ADMISSION
Respiratory distress.

## 2022-07-14 NOTE — CONSULT NOTE ADULT - CONVERSATION DETAILS
Met with lenore Guerrier with other family members at bedside. Confirmed GOC and wishes for comfort care and d/c to inpatient hospice (see previous GOC note for more details). Family was grieving appropriately. Psychosocial support provided.

## 2022-07-14 NOTE — PROGRESS NOTE ADULT - SUBJECTIVE AND OBJECTIVE BOX
GENO VERA is a 87yFemale , patient examined and chart reviewed.     INTERVAL HPI/ OVERNIGHT EVENTS:   Afebrile Confused.  On NC    PAST MEDICAL & SURGICAL HISTORY:  Cholesterolosis  Alzheimer&#x27;s dementia without behavioral disturbance, unspecified timing of dementia onset  Depression, unspecified depression type  Parkinsonism, unspecified Parkinsonism type  Falls frequently        For details regarding the patient's social history, family history, and other miscellaneous elements, please refer the initial infectious diseases consultation and/or the admitting history and physical examination for this admission.    ROS:  Unable to obtain due to : Dementia      Current inpatient medications :    ANTIBIOTICS/RELEVANT:  piperacillin/tazobactam IVPB.. 3.375 Gram(s) IV Intermittent every 8 hours      MEDICATIONS  (STANDING):  amLODIPine   Tablet 5 milliGRAM(s) Oral daily  aspirin enteric coated 81 milliGRAM(s) Oral daily  atorvastatin 20 milliGRAM(s) Oral at bedtime  carbidopa/levodopa  25/100 1 Tablet(s) Oral three times a day  dexAMETHasone  Injectable 6 milliGRAM(s) IV Push daily  dextrose 5%. 1000 milliLiter(s) (50 mL/Hr) IV Continuous <Continuous>  dextrose 5%. 1000 milliLiter(s) (100 mL/Hr) IV Continuous <Continuous>  dextrose 50% Injectable 25 Gram(s) IV Push once  dextrose 50% Injectable 12.5 Gram(s) IV Push once  dextrose 50% Injectable 25 Gram(s) IV Push once  donepezil 5 milliGRAM(s) Oral daily  enoxaparin Injectable 40 milliGRAM(s) SubCutaneous every 24 hours  glucagon  Injectable 1 milliGRAM(s) IntraMuscular once  insulin lispro (ADMELOG) corrective regimen sliding scale   SubCutaneous Before meals and at bedtime  lactated ringers. 1000 milliLiter(s) (100 mL/Hr) IV Continuous <Continuous>  memantine 10 milliGRAM(s) Oral two times a day  sertraline 100 milliGRAM(s) Oral daily    MEDICATIONS  (PRN):  acetaminophen     Tablet .. 650 milliGRAM(s) Oral every 6 hours PRN Temp greater or equal to 38C (100.4F), Mild Pain (1 - 3)  ALBUTerol    0.083% 2.5 milliGRAM(s) Nebulizer every 8 hours PRN Shortness of Breath and/or Wheezing  dextrose Oral Gel 15 Gram(s) Oral once PRN Blood Glucose LESS THAN 70 milliGRAM(s)/deciliter  hydrALAZINE Injectable 5 milliGRAM(s) IV Push every 6 hours PRN SBP >160  melatonin 3 milliGRAM(s) Oral at bedtime PRN Insomnia  ondansetron Injectable 4 milliGRAM(s) IV Push every 6 hours PRN Nausea and/or Vomiting        Objective:  Vital Signs Last 24 Hrs  T(C): 36.2 (2022 20:15), Max: 37.1 (2022 08:00)  T(F): 97.1 (2022 20:15), Max: 98.7 (2022 08:00)  HR: 96 (2022 18:00) (88 - 109)  BP: 134/58 (2022 18:00) (94/80 - 169/74)  BP(mean): 80 (2022 18:00) (75 - 95)  RR: 34 (2022 18:00) (27 - 40)  SpO2: 98% (2022 18:00) (94% - 100%)    Parameters below as of 2022 18:00  Patient On (Oxygen Delivery Method): nasal cannula        Physical Exam:  General: weak Frail no acute distress  Neck: supple, trachea midline  Lungs: clear, no wheeze/rhonchi  Cardiovascular: regular rate and rhythm, S1 S2  Abdomen: soft, nontender,  bowel sounds normal  Neurological: awake confused  Skin: no rash  Extremities: no edema        LABS:                        11.2   17.09 )-----------( 211      ( 2022 06:00 )             33.7   07-12    143  |  107  |  13  ----------------------------<  126<H>  3.4<L>   |  27  |  0.69    Ca    9.4      2022 06:00  Phos  1.9     07-11  Mg     2.0     07-12    TPro  6.0  /  Alb  2.3<L>  /  TBili  1.2  /  DBili  0.3  /  AST  12  /  ALT  <10<L>  /  AlkPhos  91  07-12    Urinalysis Basic - ( 2022 15:48 )    Color: Yellow / Appearance: Clear / S.030 / pH: x  Gluc: x / Ketone: Negative  / Bili: Negative / Urobili: Negative mg/dL   Blood: x / Protein: 100 mg/dL / Nitrite: Negative   Leuk Esterase: Trace / RBC: 11-25 /HPF / WBC 6-10   Sq Epi: x / Non Sq Epi: Few / Bacteria: Moderate        ABG - ( 2022 20:31 )  pH, Arterial: 7.37  pH, Blood: x     /  pCO2: 39    /  pO2: 108   / HCO3: 22    / Base Excess: -2.8  /  SaO2: 99.1        MICROBIOLOGY:    Culture - Urine (collected 2022 15:48)  Source: Clean Catch Clean Catch (Midstream)  Final Report (10 Jul 2022 21:23):    <10,000 CFU/mL Normal Urogenital Alida    Culture - Blood (collected 2022 15:48)  Source: .Blood Blood-Peripheral  Gram Stain (2022 06:46):    Growth in anaerobic bottle: Gram Positive Cocci in Clusters  Preliminary Report (2022 06:47):    Growth in anaerobic bottle: Gram Positive Cocci in Clusters    ***Blood Panel PCR results on this specimen are available    approximately 3 hours after the Gram stain result.***    Gram stain, PCR, and/or culture results may not always    correspond due todifference in methodologies.    ************************************************************    This PCR assay was performed by multiplex PCR. This    Assay tests for 66 bacterial and resistance gene targets.    Please refer to the [facility] Labs testdirectory    at [email or web address] for details.  Organism: Blood Culture PCR (2022 11:02)  Organism: Blood Culture PCR (2022 11:02)      -  Staphylococcus epidermidis, Methicillin resistant: Detec      Method Type: PCR    Culture - Blood (collected 2022 15:48)  Source: .Blood Blood-Peripheral  Preliminary Report (2022 02:02):    No growth to date.    RADIOLOGY & ADDITIONAL STUDIES:    ACC: [id number] EXAM:  CT ANGIO CHEST PULM ART [facility]                          PROCEDURE DATE:  2022          INTERPRETATION:  Clinical information: Covid, shortness of breath, assess   for PE    TECHNIQUE: A volumetric acquisition of the chest was obtained from the   thoracic inlet to the upper abdomen during dynamic administration of   intravenous contrast. 3D MIP images were provided.    CONTRAST/COMPLICATIONS:  IV Contrast: Omnipaque 350  70 cc administered   30 cc discarded  Oral Contrast: NONE  Complications: None reported at time of study completion    COMPARISON: No prior CT for comparison    FINDINGS:    CTA: The contrast bolus is satisfactory although the study is degraded by   severe respiratory motion. No filling defect in the pulmonary artery or   its branches to the lobar level. The heart is normal in size. There are   coronary artery calcifications. No pericardial effusion. The mid   ascending aorta measures 4 cm.    Lungs/Airways/Pleura: There are secretions within the trachea extending   into the right mainstem bronchus and right upper lobe bronchus as well as   areas of mucoid impaction in both lower lobes. There are extensive   centrilobular and branching linear densities and bronchocentric   groundglass densities throughout all 5 lobes. There is a 9 mm round   nodule in the left lower lobe on image 54 series 5. No pleural effusion.    Mediastinum/Lymph nodes: The esophagus is dilated and debris-filled to   the level of the thoracic inlet.    Upper Abdomen:Evaluation degraded by severe motion artifact.    Bones and Soft Tissues: No aggressive osseous lesions.    IMPRESSION:    1.  Study degraded by severe respiratory motion. No filling defect in the   pulmonary artery or its branches to the lobar level    2.  Findings throughout all 5 lobes favored to represent multifocal   aspiration given the dilated fluid-filled esophagus and degree of   endobronchial debris/secretions. A repeat chest CT scan is recommended in   6 weeks to ensure resolution    3.  Left lower lobe 9 mm nodule can be reassessed on the above   recommended follow-up          Assessment :  This is an 88 y/o F with PMH of HTN, Pre-DM, Dyslipidemia, AS, Parkinson's Disease, Alzheimer Dementia, valvuloplasty who was sent from her assisted living facility for respiratory distress & AMS,   recent covid 19   - covid 19 day 14 puts her out of window for antivirals  ct reviewed with infiltrates b/l pt at risk for aspiration  Asp pneumonia  leukocytosis multifactorial including steroids   Bacteremia with MRSE contaminant    Plan:  cont zosyn for possible aspiration   strict aspiration precautions  Trend temps and cbc  Decadron short course x 5 days  Stable from ID standpoint      Continue with present regiment.  Appropriate use of antibiotics and adverse effects reviewed.    > 35 minutes were spent in direct patient care reviewing notes, medications ,labs data/ imaging , discussion with multidisciplinary team.    Thank you for allowing me to participate in care of your patient .    David George MD  Infectious Disease  [phone]
Date/Time Patient Seen:  		  Referring MD:   Data Reviewed	       Patient is a 87y old  Female who presents with a chief complaint of Respiratory distress. (10 Jul 2022 14:32)      Subjective/HPI     PAST MEDICAL & SURGICAL HISTORY:  Cholesterolosis    Alzheimer&#x27;s dementia without behavioral disturbance, unspecified timing of dementia onset    Depression, unspecified depression type    Parkinsonism, unspecified Parkinsonism type    Falls frequently    No significant past surgical history          Medication list         MEDICATIONS  (STANDING):  amLODIPine   Tablet 5 milliGRAM(s) Oral daily  aspirin enteric coated 81 milliGRAM(s) Oral daily  atorvastatin 20 milliGRAM(s) Oral at bedtime  carbidopa/levodopa  25/100 1 Tablet(s) Oral three times a day  dexAMETHasone  Injectable 6 milliGRAM(s) IV Push daily  dextrose 5%. 1000 milliLiter(s) (50 mL/Hr) IV Continuous <Continuous>  dextrose 5%. 1000 milliLiter(s) (100 mL/Hr) IV Continuous <Continuous>  dextrose 50% Injectable 25 Gram(s) IV Push once  dextrose 50% Injectable 12.5 Gram(s) IV Push once  dextrose 50% Injectable 25 Gram(s) IV Push once  donepezil 5 milliGRAM(s) Oral daily  enoxaparin Injectable 40 milliGRAM(s) SubCutaneous every 24 hours  glucagon  Injectable 1 milliGRAM(s) IntraMuscular once  insulin lispro (ADMELOG) corrective regimen sliding scale   SubCutaneous every 6 hours  lactated ringers. 1000 milliLiter(s) (100 mL/Hr) IV Continuous <Continuous>  memantine 10 milliGRAM(s) Oral two times a day  piperacillin/tazobactam IVPB.. 3.375 Gram(s) IV Intermittent every 8 hours  sertraline 100 milliGRAM(s) Oral daily  vancomycin  IVPB      vancomycin  IVPB 1000 milliGRAM(s) IV Intermittent once    MEDICATIONS  (PRN):  acetaminophen     Tablet .. 650 milliGRAM(s) Oral every 6 hours PRN Temp greater or equal to 38C (100.4F), Mild Pain (1 - 3)  ALBUTerol    0.083% 2.5 milliGRAM(s) Nebulizer every 8 hours PRN Shortness of Breath and/or Wheezing  dextrose Oral Gel 15 Gram(s) Oral once PRN Blood Glucose LESS THAN 70 milliGRAM(s)/deciliter  hydrALAZINE Injectable 5 milliGRAM(s) IV Push every 6 hours PRN SBP >160  melatonin 3 milliGRAM(s) Oral at bedtime PRN Insomnia  ondansetron Injectable 4 milliGRAM(s) IV Push every 6 hours PRN Nausea and/or Vomiting         Vitals log        ICU Vital Signs Last 24 Hrs  T(C): 37 (11 Jul 2022 05:52), Max: 37.7 (10 Jul 2022 11:00)  T(F): 98.6 (11 Jul 2022 05:52), Max: 99.9 (10 Jul 2022 16:37)  HR: 97 (11 Jul 2022 06:00) (73 - 97)  BP: 133/48 (11 Jul 2022 06:00) (116/75 - 195/79)  BP(mean): 69 (11 Jul 2022 06:00) (69 - 160)  ABP: --  ABP(mean): --  RR: 33 (11 Jul 2022 06:00) (17 - 33)  SpO2: 93% (11 Jul 2022 06:00) (92% - 100%)    O2 Parameters below as of 11 Jul 2022 06:00  Patient On (Oxygen Delivery Method): nasal cannula  O2 Flow (L/min): 2               Input and Output:  I&O's Detail    10 Jul 2022 07:01  -  11 Jul 2022 07:00  --------------------------------------------------------  IN:    IV PiggyBack: 200 mL    Lactated Ringers: 1900 mL    Oral Fluid: 100 mL  Total IN: 2200 mL    OUT:    Voided (mL): 1300 mL  Total OUT: 1300 mL    Total NET: 900 mL          Lab Data                        12.3   17.76 )-----------( 190      ( 11 Jul 2022 06:51 )             36.7     07-10    x   |  x   |  x   ----------------------------<  x   x    |  x   |  0.89    Ca    9.1      09 Jul 2022 16:19    TPro  6.8  /  Alb  3.1<L>  /  TBili  1.1  /  DBili  0.3  /  AST  16  /  ALT  15  /  AlkPhos  96  07-10    ABG - ( 09 Jul 2022 20:31 )  pH, Arterial: 7.37  pH, Blood: x     /  pCO2: 39    /  pO2: 108   / HCO3: 22    / Base Excess: -2.8  /  SaO2: 99.1                    Review of Systems	      Objective     Physical Examination    heart s1s2  lung dec BS  abd soft      Pertinent Lab findings & Imaging      Alireza:  NO   Adequate UO     I&O's Detail    10 Jul 2022 07:01  -  11 Jul 2022 07:00  --------------------------------------------------------  IN:    IV PiggyBack: 200 mL    Lactated Ringers: 1900 mL    Oral Fluid: 100 mL  Total IN: 2200 mL    OUT:    Voided (mL): 1300 mL  Total OUT: 1300 mL    Total NET: 900 mL               Discussed with:     Cultures:	        Radiology                            
     GENO VERA is a 87yFemale , patient examined and chart reviewed.     INTERVAL HPI/ OVERNIGHT EVENTS:   Afebrile Confused.    PAST MEDICAL & SURGICAL HISTORY:  Cholesterolosis  Alzheimer&#x27;s dementia without behavioral disturbance, unspecified timing of dementia onset  Depression, unspecified depression type  Parkinsonism, unspecified Parkinsonism type  Falls frequently        For details regarding the patient's social history, family history, and other miscellaneous elements, please refer the initial infectious diseases consultation and/or the admitting history and physical examination for this admission.    ROS:  Unable to obtain due to : Dementia      Current inpatient medications :    ANTIBIOTICS/RELEVANT:  piperacillin/tazobactam IVPB.. 3.375 Gram(s) IV Intermittent every 8 hours  vancomycin  IVPB          acetaminophen     Tablet .. 650 milliGRAM(s) Oral every 6 hours PRN  ALBUTerol    0.083% 2.5 milliGRAM(s) Nebulizer every 8 hours PRN  amLODIPine   Tablet 5 milliGRAM(s) Oral daily  aspirin enteric coated 81 milliGRAM(s) Oral daily  atorvastatin 20 milliGRAM(s) Oral at bedtime  carbidopa/levodopa  25/100 1 Tablet(s) Oral three times a day  dexAMETHasone  Injectable 6 milliGRAM(s) IV Push daily  dextrose 5%. 1000 milliLiter(s) IV Continuous <Continuous>  dextrose 5%. 1000 milliLiter(s) IV Continuous <Continuous>  dextrose 50% Injectable 25 Gram(s) IV Push once  dextrose 50% Injectable 12.5 Gram(s) IV Push once  dextrose 50% Injectable 25 Gram(s) IV Push once  dextrose Oral Gel 15 Gram(s) Oral once PRN  donepezil 5 milliGRAM(s) Oral daily  enoxaparin Injectable 40 milliGRAM(s) SubCutaneous every 24 hours  glucagon  Injectable 1 milliGRAM(s) IntraMuscular once  hydrALAZINE Injectable 5 milliGRAM(s) IV Push every 6 hours PRN  insulin lispro (ADMELOG) corrective regimen sliding scale   SubCutaneous every 6 hours  lactated ringers. 1000 milliLiter(s) IV Continuous <Continuous>  melatonin 3 milliGRAM(s) Oral at bedtime PRN  memantine 10 milliGRAM(s) Oral two times a day  ondansetron Injectable 4 milliGRAM(s) IV Push every 6 hours PRN  potassium chloride    Tablet ER 40 milliEquivalent(s) Oral every 4 hours  sertraline 100 milliGRAM(s) Oral daily      Objective:    07-10 @ 07:01  -   @ 07:00  --------------------------------------------------------  IN: 2200 mL / OUT: 1300 mL / NET: 900 mL      T(C): 36.8 (22 @ 12:30), Max: 37.7 (07-10-22 @ 16:37)  HR: 103 (22 @ 08:00) (73 - 103)  BP: 146/54 (22 @ 08:00) (118/57 - 189/61)  RR: 25 (22 @ 08:00) (20 - 33)  SpO2: 97% (22 @ 08:00) (93% - 100%)  Wt(kg): --      Physical Exam:  General: weak Frail no acute distress  Neck: supple, trachea midline  Lungs: clear, no wheeze/rhonchi  Cardiovascular: regular rate and rhythm, S1 S2  Abdomen: soft, nontender,  bowel sounds normal  Neurological: awake confused  Skin: no rash  Extremities: no edema        LABS:                          12.0   16.13 )-----------( 205      ( 2022 12:00 )             36.0       0711    143  |  105  |  15  ----------------------------<  182<H>  2.6<LL>   |  28  |  1.15    Ca    9.5      2022 12:00  Phos  1.9     07  Mg     1.7         TPro  6.3  /  Alb  2.7<L>  /  TBili  1.8<H>  /  DBili  0.5<H>  /  AST  17  /  ALT  <10<L>  /  AlkPhos  90  07-11      PT/INR - ( 2022 06:51 )   PT: 18.1 sec;   INR: 1.57 ratio         PTT - ( 2022 15:48 )  PTT:23.5 sec  Urinalysis Basic - ( 2022 15:48 )    Color: Yellow / Appearance: Clear / S.030 / pH: x  Gluc: x / Ketone: Negative  / Bili: Negative / Urobili: Negative mg/dL   Blood: x / Protein: 100 mg/dL / Nitrite: Negative   Leuk Esterase: Trace / RBC: 11-25 /HPF / WBC 6-10   Sq Epi: x / Non Sq Epi: Few / Bacteria: Moderate        ABG - ( 2022 20:31 )  pH, Arterial: 7.37  pH, Blood: x     /  pCO2: 39    /  pO2: 108   / HCO3: 22    / Base Excess: -2.8  /  SaO2: 99.1        MICROBIOLOGY:    Culture - Urine (collected 2022 15:48)  Source: Clean Catch Clean Catch (Midstream)  Final Report (10 Jul 2022 21:23):    <10,000 CFU/mL Normal Urogenital Alida    Culture - Blood (collected 2022 15:48)  Source: .Blood Blood-Peripheral  Gram Stain (2022 06:46):    Growth in anaerobic bottle: Gram Positive Cocci in Clusters  Preliminary Report (2022 06:47):    Growth in anaerobic bottle: Gram Positive Cocci in Clusters    ***Blood Panel PCR results on this specimen are available    approximately 3 hours after the Gram stain result.***    Gram stain, PCR, and/or culture results may not always    correspond due todifference in methodologies.    ************************************************************    This PCR assay was performed by multiplex PCR. This    Assay tests for 66 bacterial and resistance gene targets.    Please refer to the [facility] Labs testdirectory    at [email or web address] for details.  Organism: Blood Culture PCR (2022 11:02)  Organism: Blood Culture PCR (2022 11:02)      -  Staphylococcus epidermidis, Methicillin resistant: Detec      Method Type: PCR    Culture - Blood (collected 2022 15:48)  Source: .Blood Blood-Peripheral  Preliminary Report (2022 02:02):    No growth to date.    RADIOLOGY & ADDITIONAL STUDIES:    ACC: [id number] EXAM:  CT ANGIO CHEST PULM ART [facility]                          PROCEDURE DATE:  2022          INTERPRETATION:  Clinical information: Covid, shortness of breath, assess   for PE    TECHNIQUE: A volumetric acquisition of the chest was obtained from the   thoracic inlet to the upper abdomen during dynamic administration of   intravenous contrast. 3D MIP images were provided.    CONTRAST/COMPLICATIONS:  IV Contrast: Omnipaque 350  70 cc administered   30 cc discarded  Oral Contrast: NONE  Complications: None reported at time of study completion    COMPARISON: No prior CT for comparison    FINDINGS:    CTA: The contrast bolus is satisfactory although the study is degraded by   severe respiratory motion. No filling defect in the pulmonary artery or   its branches to the lobar level. The heart is normal in size. There are   coronary artery calcifications. No pericardial effusion. The mid   ascending aorta measures 4 cm.    Lungs/Airways/Pleura: There are secretions within the trachea extending   into the right mainstem bronchus and right upper lobe bronchus as well as   areas of mucoid impaction in both lower lobes. There are extensive   centrilobular and branching linear densities and bronchocentric   groundglass densities throughout all 5 lobes. There is a 9 mm round   nodule in the left lower lobe on image 54 series 5. No pleural effusion.    Mediastinum/Lymph nodes: The esophagus is dilated and debris-filled to   the level of the thoracic inlet.    Upper Abdomen:Evaluation degraded by severe motion artifact.    Bones and Soft Tissues: No aggressive osseous lesions.    IMPRESSION:    1.  Study degraded by severe respiratory motion. No filling defect in the   pulmonary artery or its branches to the lobar level    2.  Findings throughout all 5 lobes favored to represent multifocal   aspiration given the dilated fluid-filled esophagus and degree of   endobronchial debris/secretions. A repeat chest CT scan is recommended in   6 weeks to ensure resolution    3.  Left lower lobe 9 mm nodule can be reassessed on the above   recommended follow-up          Assessment :  This is an 88 y/o F with PMH of HTN, Pre-DM, Dyslipidemia, AS, Parkinson's Disease, Alzheimer Dementia, valvuloplasty who was sent from her assisted living facility for respiratory distress & AMS,   recent covid 19   - covid 19 day 14 puts her out of window for antivirals  ct reviewed with infiltrates b/l pt at risk for aspiration  Asp pneumonia  leukocytosis multifactorial including steroids   Bacteremia with MRSE contaminant    Plan:  cont zosyn for possible aspiration   stop vancomycin   strict aspiration precautions  Trend temps and cbc  Decadron short course  Stable from ID standpoint      Continue with present regiment.  Appropriate use of antibiotics and adverse effects reviewed.      I have discussed the above plan of care with patient/ family in detail. They expressed understanding of the  treatment plan . Risks, benefits and alternatives discussed in detail. I have asked if they have any questions or concerns and appropriately addressed them to the best of my ability .    > 35 minutes were spent in direct patient care reviewing notes, medications ,labs data/ imaging , discussion with multidisciplinary team.    Thank you for allowing me to participate in care of your patient .    David George MD  Infectious Disease  [phone]
Patient is a 87y old  Female who presents with a chief complaint of Respiratory distress. (12 Jul 2022 15:46)      BRIEF HOSPITAL COURSE: This is an 86 y/o F with PMH of HTN, Pre-DM, Dyslipidemia, AS, Parkinson's Disease, Alzheimer Dementia, Anxiety, and Depression who was sent from her assisted living facility for respiratory distress & AMS, was reported to have thick secretions in her throat with dropping of her SPO2 to the low 70's, improved with suctioning & oxygen supplementation to upper 80's & low 90's, no reported fever. Patient was tested positive for COVID-19 about 2 weeks ago. At the ED her CTA showed no PE to the level of lobes, but showed evidence suggestive of aspiration PNA. Patient is non verbal, no further history could be obtained at this time    Events last 24 hours: Pt remains on NC in NAD or NARD    PAST MEDICAL & SURGICAL HISTORY:  Cholesterolosis      Alzheimer&#x27;s dementia without behavioral disturbance, unspecified timing of dementia onset      Depression, unspecified depression type      Parkinsonism, unspecified Parkinsonism type      Falls frequently          Review of Systems:  ANYA due to clinical condition/dementia    Medications:  piperacillin/tazobactam IVPB.. 3.375 Gram(s) IV Intermittent every 8 hours    amLODIPine   Tablet 5 milliGRAM(s) Oral daily  hydrALAZINE Injectable 5 milliGRAM(s) IV Push every 6 hours PRN    ALBUTerol    0.083% 2.5 milliGRAM(s) Nebulizer every 8 hours PRN    acetaminophen     Tablet .. 650 milliGRAM(s) Oral every 6 hours PRN  carbidopa/levodopa  25/100 1 Tablet(s) Oral three times a day  donepezil 5 milliGRAM(s) Oral daily  melatonin 3 milliGRAM(s) Oral at bedtime PRN  memantine 10 milliGRAM(s) Oral two times a day  ondansetron Injectable 4 milliGRAM(s) IV Push every 6 hours PRN  sertraline 100 milliGRAM(s) Oral daily      aspirin enteric coated 81 milliGRAM(s) Oral daily  enoxaparin Injectable 40 milliGRAM(s) SubCutaneous every 24 hours        atorvastatin 20 milliGRAM(s) Oral at bedtime  dexAMETHasone  Injectable 6 milliGRAM(s) IV Push daily  dextrose 50% Injectable 25 Gram(s) IV Push once  dextrose 50% Injectable 12.5 Gram(s) IV Push once  dextrose 50% Injectable 25 Gram(s) IV Push once  dextrose Oral Gel 15 Gram(s) Oral once PRN  glucagon  Injectable 1 milliGRAM(s) IntraMuscular once  insulin lispro (ADMELOG) corrective regimen sliding scale   SubCutaneous Before meals and at bedtime    dextrose 5%. 1000 milliLiter(s) IV Continuous <Continuous>  dextrose 5%. 1000 milliLiter(s) IV Continuous <Continuous>  lactated ringers. 1000 milliLiter(s) IV Continuous <Continuous>                ICU Vital Signs Last 24 Hrs  T(C): 36.2 (12 Jul 2022 20:15), Max: 37.1 (12 Jul 2022 08:00)  T(F): 97.1 (12 Jul 2022 20:15), Max: 98.7 (12 Jul 2022 08:00)  HR: 96 (12 Jul 2022 18:00) (88 - 109)  BP: 134/58 (12 Jul 2022 18:00) (94/80 - 169/74)  BP(mean): 80 (12 Jul 2022 18:00) (75 - 95)  ABP: --  ABP(mean): --  RR: 34 (12 Jul 2022 18:00) (27 - 40)  SpO2: 98% (12 Jul 2022 18:00) (94% - 100%)    O2 Parameters below as of 12 Jul 2022 18:00  Patient On (Oxygen Delivery Method): nasal cannula                I&O's Detail    11 Jul 2022 07:01  -  12 Jul 2022 07:00  --------------------------------------------------------  IN:    IV PiggyBack: 950 mL    Lactated Ringers: 2400 mL  Total IN: 3350 mL    OUT:    Voided (mL): 700 mL  Total OUT: 700 mL    Total NET: 2650 mL      12 Jul 2022 07:01  -  12 Jul 2022 22:32  --------------------------------------------------------  IN:    IV PiggyBack: 100 mL    Lactated Ringers: 1100 mL  Total IN: 1200 mL    OUT:  Total OUT: 0 mL    Total NET: 1200 mL          LABS:                        11.2   17.09 )-----------( 211      ( 12 Jul 2022 06:00 )             33.7     07-12    143  |  107  |  13  ----------------------------<  126<H>  3.4<L>   |  27  |  0.69    Ca    9.4      12 Jul 2022 06:00  Phos  1.9     07-11  Mg     2.0     07-12    TPro  6.0  /  Alb  2.3<L>  /  TBili  1.2  /  DBili  0.3  /  AST  12  /  ALT  <10<L>  /  AlkPhos  91  07-12          CAPILLARY BLOOD GLUCOSE      POCT Blood Glucose.: 146 mg/dL (12 Jul 2022 16:52)    PT/INR - ( 12 Jul 2022 06:00 )   PT: 15.3 sec;   INR: 1.33 ratio             CULTURES:  Culture Results:   No growth to date. (07-09 @ 15:48)  Culture Results:   Growth in anaerobic bottle: Staphylococcus epidermidis  Coag Negative Staphylococcus  Single set isolate, possible contaminant. Contact  Microbiology if susceptibility testing clinically  indicated.  ***Blood Panel PCR results on this specimen are available  approximately 3 hours after the Gram stain result.***  Gram stain, PCR, and/or culture results may not always  correspond due to difference in methodologies.  ************************************************************  This PCR assay was performed by multiplex PCR. This  Assay tests for 66 bacterial and resistance gene targets.  Please refer to the [facility] Labs test directory  at [email or web address] for details. (07-09 @ 15:48)  Culture Results:   <10,000 CFU/mL Normal Urogenital Alida (07-09 @ 15:48)      Physical Examination:    General: Well appearing, lying in bed in NAD.      HEENT: Pupils equal, reactive to light. Symmetric. No scleral icterus or injection.    PULM: Clear/ Diminshed to auscultation B/L. No wheezes, rales, or rhonchi apprecaited. No significant sputum production or increased respiratory effort.    NECK: Supple, no lymphadenopathy, trachea midline.    CVS: Regular rate and rhythm, no murmurs appreciated, +s1/s2.    ABD: Soft, nondistended, nontender, normoactive bowel sounds.    EXT: No edema, nontender.    SKIN: Warm and well perfused, no rashes noted.    NEURO: Alert, Pleasantly confused, interactive, nonfocal.    RADIOLOGY: < from: CT Angio Chest PE Protocol w/ IV Cont (07.09.22 @ 16:59) >  INTERPRETATION:  Clinical information: Covid, shortness of breath, assess   for PE    TECHNIQUE: A volumetric acquisition of the chest was obtained from the   thoracic inlet to the upper abdomen during dynamic administration of   intravenous contrast. 3D MIP images were provided.    CONTRAST/COMPLICATIONS:  IV Contrast: Omnipaque 350  70 cc administered   30 cc discarded  Oral Contrast: NONE  Complications: None reported at time of study completion    COMPARISON: No prior CT for comparison    FINDINGS:    CTA: The contrast bolus is satisfactory although the study is degraded by   severe respiratory motion. No filling defect in the pulmonary artery or   its branches to the lobar level. The heart is normal in size. There are   coronary artery calcifications. No pericardial effusion. The mid   ascending aorta measures 4 cm.    Lungs/Airways/Pleura: There are secretions within the trachea extending   into the right mainstem bronchus and right upper lobe bronchus as well as   areas of mucoid impaction in both lower lobes. There are extensive   centrilobular and branching linear densities and bronchocentric   groundglass densities throughout all 5 lobes. There is a 9 mm round   nodule in the left lower lobe on image 54 series 5. No pleural effusion.    Mediastinum/Lymph nodes: The esophagus is dilated and debris-filled to   the level of the thoracic inlet.    Upper Abdomen:Evaluation degraded by severe motion artifact.    Bones and Soft Tissues: No aggressive osseous lesions.    IMPRESSION:    1.  Study degraded by severe respiratory motion. No filling defect in the   pulmonary artery or its branches to the lobar level    2.  Findings throughout all 5 lobes favored to represent multifocal   aspiration given the dilated fluid-filled esophagus and degree of   endobronchial debris/secretions. A repeat chest CT scan is recommended in   6 weeks to ensure resolution    3.  Left lower lobe 9 mm nodule can be reassessed on the above   recommended follow-up    --- End of Report ---    < end of copied text >    
     GENO VERA is a 87yFemale , patient examined and chart reviewed.     INTERVAL HPI/ OVERNIGHT EVENTS:   Daughtes at bedside.  Pt lethargic. Made comfort care.    PAST MEDICAL & SURGICAL HISTORY:  Cholesterolosis  Alzheimer&#x27;s dementia without behavioral disturbance, unspecified timing of dementia onset  Depression, unspecified depression type  Parkinsonism, unspecified Parkinsonism type  Falls frequently        For details regarding the patient's social history, family history, and other miscellaneous elements, please refer the initial infectious diseases consultation and/or the admitting history and physical examination for this admission.    ROS:  Unable to obtain due to : Dementia      Current inpatient medications :    ANTIBIOTICS/RELEVANT:    MEDICATIONS  (STANDING):  carbidopa/levodopa  25/100 1 Tablet(s) Oral three times a day  morphine  - Injectable 1 milliGRAM(s) IV Push every 6 hours    MEDICATIONS  (PRN):  acetaminophen     Tablet .. 650 milliGRAM(s) Oral every 6 hours PRN Temp greater or equal to 38C (100.4F), Mild Pain (1 - 3)  ALBUTerol    0.083% 2.5 milliGRAM(s) Nebulizer every 8 hours PRN Shortness of Breath and/or Wheezing  morphine  - Injectable 2 milliGRAM(s) IV Push every 1 hour PRN Severe Pain (7 - 10)  morphine  - Injectable 1 milliGRAM(s) IV Push every 1 hour PRN Moderate Pain (4 - 6)  morphine  - Injectable 2 milliGRAM(s) IV Push every 1 hour PRN shortness of breath  ondansetron Injectable 4 milliGRAM(s) IV Push every 6 hours PRN Nausea and/or Vomiting    Objective:        Physical Exam:  General: Lethargic  Neck: supple, trachea midline  Lungs: decreased, no wheeze/rhonchi  Cardiovascular: regular rate and rhythm, S1 S2  Abdomen: soft, nontender,  bowel sounds normal  Neurological: Dementia  Skin: no rash  Extremities: no edema        LABS:      MICROBIOLOGY:    Culture - Urine (collected 09 Jul 2022 15:48)  Source: Clean Catch Clean Catch (Midstream)  Final Report (10 Jul 2022 21:23):    <10,000 CFU/mL Normal Urogenital Alida    Culture - Blood (collected 09 Jul 2022 15:48)  Source: .Blood Blood-Peripheral  Gram Stain (11 Jul 2022 06:46):    Growth in anaerobic bottle: Gram Positive Cocci in Clusters  Preliminary Report (11 Jul 2022 06:47):    Growth in anaerobic bottle: Gram Positive Cocci in Clusters    ***Blood Panel PCR results on this specimen are available    approximately 3 hours after the Gram stain result.***    Gram stain, PCR, and/or culture results may not always    correspond due todifference in methodologies.    ************************************************************    This PCR assay was performed by multiplex PCR. This    Assay tests for 66 bacterial and resistance gene targets.    Please refer to the [facility] testdirectory    at [email or web address] for details.  Organism: Blood Culture PCR (11 Jul 2022 11:02)  Organism: Blood Culture PCR (11 Jul 2022 11:02)      -  Staphylococcus epidermidis, Methicillin resistant: Detec      Method Type: PCR    Culture - Blood (collected 09 Jul 2022 15:48)  Source: .Blood Blood-Peripheral  Preliminary Report (11 Jul 2022 02:02):    No growth to date.    RADIOLOGY & ADDITIONAL STUDIES:    ACC: [id number] EXAM:  CT ANGIO CHEST PULM [facility]                          PROCEDURE DATE:  07/09/2022          INTERPRETATION:  Clinical information: Covid, shortness of breath, assess   for PE    TECHNIQUE: A volumetric acquisition of the chest was obtained from the   thoracic inlet to the upper abdomen during dynamic administration of   intravenous contrast. 3D MIP images were provided.    CONTRAST/COMPLICATIONS:  IV Contrast: Omnipaque 350  70 cc administered   30 cc discarded  Oral Contrast: NONE  Complications: None reported at time of study completion    COMPARISON: No prior CT for comparison    FINDINGS:    CTA: The contrast bolus is satisfactory although the study is degraded by   severe respiratory motion. No filling defect in the pulmonary artery or   its branches to the lobar level. The heart is normal in size. There are   coronary artery calcifications. No pericardial effusion. The mid   ascending aorta measures 4 cm.    Lungs/Airways/Pleura: There are secretions within the trachea extending   into the right mainstem bronchus and right upper lobe bronchus as well as   areas of mucoid impaction in both lower lobes. There are extensive   centrilobular and branching linear densities and bronchocentric   groundglass densities throughout all 5 lobes. There is a 9 mm round   nodule in the left lower lobe on image 54 series 5. No pleural effusion.    Mediastinum/Lymph nodes: The esophagus is dilated and debris-filled to   the level of the thoracic inlet.    Upper Abdomen:Evaluation degraded by severe motion artifact.    Bones and Soft Tissues: No aggressive osseous lesions.    IMPRESSION:    1.  Study degraded by severe respiratory motion. No filling defect in the   pulmonary artery or its branches to the lobar level    2.  Findings throughout all 5 lobes favored to represent multifocal   aspiration given the dilated fluid-filled esophagus and degree of   endobronchial debris/secretions. A repeat chest CT scan is recommended in   6 weeks to ensure resolution    3.  Left lower lobe 9 mm nodule can be reassessed on the above   recommended follow-up      ACC: [id number] EXAM:  XR CHEST PORTABLE ROUTINE 1V                        ACC: [id number] EXAM:  XR CHEST PORTABLE URGENT 1V                        ACC: [id number] EXAM:  XR CHEST PORTABLE IMMED 1V                          PROCEDURE DATE:  07/12/2022          INTERPRETATION:  AP semierect chest on July 12, 2022 at 8:50 AM. Patient   is short of breath.    Heart magnified by technique.    There are significant scattered interstitial infiltrates throughout the   lungs suggesting multifocal pneumonia.    This pattern is increase from October 2, 2020.    Follow-up AP chest on July 13, 2022 at 6:17 AM. Infiltrates have   increased particularly in the upper lobes.    Follow-up AP chest on July 13, 2022 at 9:43 AM. Increasing lung   consolidation in the upper lobes.    IMPRESSION: Scattered lung infiltrates increased from prior. The last 2   films show progression of the infiltrates particularly in the upper lobes.        Assessment :  This is an 86 y/o F with PMH of HTN, Pre-DM, Dyslipidemia, AS, Parkinson's Disease, Alzheimer Dementia, valvuloplasty who was sent from her assisted living facility for respiratory distress & AMS,   recent covid 19 6/28  - covid 19 day 14 puts her out of window for antivirals  ct reviewed with infiltrates likely sec aspiration pneumonia  Doubt COVID 19 pneumonia  leukocytosis multifactorial including steroids - downtrending  Bacteremia with MRSE contaminant  FTT with poor po intake  Made comfort care    Plan:  Comfort care  Hospice   Will sign off case      Continue with present regiment.  Appropriate use of antibiotics and adverse effects reviewed.    25 minutes were spent in direct patient care reviewing notes, medications ,labs data/ imaging , discussion with multidisciplinary team.    Thank you for allowing me to participate in care of your patient .    David George MD  Infectious Disease  [phone]
     GENO VERA is a 87yFemale , patient examined and chart reviewed.     INTERVAL HPI/ OVERNIGHT EVENTS:   Lethargic. Events noted - Had resp distress placed on VM. Repeat CXR showed worsening infiltrates.  On 4L NC currently. Daughter at bedside.    PAST MEDICAL & SURGICAL HISTORY:  Cholesterolosis  Alzheimer&#x27;s dementia without behavioral disturbance, unspecified timing of dementia onset  Depression, unspecified depression type  Parkinsonism, unspecified Parkinsonism type  Falls frequently        For details regarding the patient's social history, family history, and other miscellaneous elements, please refer the initial infectious diseases consultation and/or the admitting history and physical examination for this admission.    ROS:  Unable to obtain due to : Dementia      Current inpatient medications :    ANTIBIOTICS/RELEVANT:  piperacillin/tazobactam IVPB.. 3.375 Gram(s) IV Intermittent every 8 hours    MEDICATIONS  (STANDING):  amLODIPine   Tablet 5 milliGRAM(s) Oral daily  aspirin enteric coated 81 milliGRAM(s) Oral daily  atorvastatin 20 milliGRAM(s) Oral at bedtime  carbidopa/levodopa  25/100 1 Tablet(s) Oral three times a day  dexAMETHasone  Injectable 6 milliGRAM(s) IV Push daily  dextrose 5%. 1000 milliLiter(s) (50 mL/Hr) IV Continuous <Continuous>  dextrose 5%. 1000 milliLiter(s) (100 mL/Hr) IV Continuous <Continuous>  dextrose 50% Injectable 25 Gram(s) IV Push once  dextrose 50% Injectable 12.5 Gram(s) IV Push once  dextrose 50% Injectable 25 Gram(s) IV Push once  donepezil 5 milliGRAM(s) Oral daily  enoxaparin Injectable 40 milliGRAM(s) SubCutaneous every 24 hours  glucagon  Injectable 1 milliGRAM(s) IntraMuscular once  insulin lispro (ADMELOG) corrective regimen sliding scale   SubCutaneous Before meals and at bedtime  memantine 10 milliGRAM(s) Oral two times a day  sertraline 100 milliGRAM(s) Oral daily    MEDICATIONS  (PRN):  acetaminophen     Tablet .. 650 milliGRAM(s) Oral every 6 hours PRN Temp greater or equal to 38C (100.4F), Mild Pain (1 - 3)  ALBUTerol    0.083% 2.5 milliGRAM(s) Nebulizer every 8 hours PRN Shortness of Breath and/or Wheezing  dextrose Oral Gel 15 Gram(s) Oral once PRN Blood Glucose LESS THAN 70 milliGRAM(s)/deciliter  hydrALAZINE Injectable 5 milliGRAM(s) IV Push every 6 hours PRN SBP >160  melatonin 3 milliGRAM(s) Oral at bedtime PRN Insomnia  ondansetron Injectable 4 milliGRAM(s) IV Push every 6 hours PRN Nausea and/or Vomiting      Objective:  Vital Signs Last 24 Hrs  T(C): 37.7 (2022 20:20), Max: 37.9 (2022 16:00)  T(F): 99.8 (2022 20:20), Max: 100.2 (2022 16:00)  HR: 100 (2022 19:00) (71 - 103)  BP: 124/77 (2022 19:00) (114/54 - 168/96)  BP(mean): 84 (2022 19:00) (72 - 114)  RR: 35 (2022 19:00) (16 - 45)  SpO2: 100% (2022 19:00) (91% - 100%)    Parameters below as of 2022 20:10  Patient On (Oxygen Delivery Method): nasal cannula      Physical Exam:  General: Lethargic  Neck: supple, trachea midline  Lungs: decreased, no wheeze/rhonchi  Cardiovascular: regular rate and rhythm, S1 S2  Abdomen: soft, nontender,  bowel sounds normal  Neurological: Dementia  Skin: no rash  Extremities: no edema        LABS:                        10.9   12.56 )-----------( 207      ( 2022 06:15 )             33.0   07-13    142  |  108  |  13  ----------------------------<  105<H>  3.7   |  29  |  0.72    Ca    9.7      2022 06:15  Mg     2.0     07-12    TPro  5.8<L>  /  Alb  2.3<L>  /  TBili  0.9  /  DBili  0.2  /  AST  14  /  ALT  <10<L>  /  AlkPhos  81  07-13      Urinalysis Basic - ( 2022 15:48 )    Color: Yellow / Appearance: Clear / S.030 / pH: x  Gluc: x / Ketone: Negative  / Bili: Negative / Urobili: Negative mg/dL   Blood: x / Protein: 100 mg/dL / Nitrite: Negative   Leuk Esterase: Trace / RBC: 11-25 /HPF / WBC 6-10   Sq Epi: x / Non Sq Epi: Few / Bacteria: Moderate        ABG - ( 2022 20:31 )  pH, Arterial: 7.37  pH, Blood: x     /  pCO2: 39    /  pO2: 108   / HCO3: 22    / Base Excess: -2.8  /  SaO2: 99.1        MICROBIOLOGY:    Culture - Urine (collected 2022 15:48)  Source: Clean Catch Clean Catch (Midstream)  Final Report (10 Jul 2022 21:23):    <10,000 CFU/mL Normal Urogenital Alida    Culture - Blood (collected 2022 15:48)  Source: .Blood Blood-Peripheral  Gram Stain (2022 06:46):    Growth in anaerobic bottle: Gram Positive Cocci in Clusters  Preliminary Report (2022 06:47):    Growth in anaerobic bottle: Gram Positive Cocci in Clusters    ***Blood Panel PCR results on this specimen are available    approximately 3 hours after the Gram stain result.***    Gram stain, PCR, and/or culture results may not always    correspond due todifference in methodologies.    ************************************************************    This PCR assay was performed by multiplex PCR. This    Assay tests for 66 bacterial and resistance gene targets.    Please refer to the [facility] Labs testdirectory    at [email or web address] for details.  Organism: Blood Culture PCR (2022 11:02)  Organism: Blood Culture PCR (2022 11:02)      -  Staphylococcus epidermidis, Methicillin resistant: Detec      Method Type: PCR    Culture - Blood (collected 2022 15:48)  Source: .Blood Blood-Peripheral  Preliminary Report (2022 02:02):    No growth to date.    RADIOLOGY & ADDITIONAL STUDIES:    ACC: [id number] EXAM:  CT ANGIO CHEST PULM [facility]                          PROCEDURE DATE:  2022          INTERPRETATION:  Clinical information: Covid, shortness of breath, assess   for PE    TECHNIQUE: A volumetric acquisition of the chest was obtained from the   thoracic inlet to the upper abdomen during dynamic administration of   intravenous contrast. 3D MIP images were provided.    CONTRAST/COMPLICATIONS:  IV Contrast: Omnipaque 350  70 cc administered   30 cc discarded  Oral Contrast: NONE  Complications: None reported at time of study completion    COMPARISON: No prior CT for comparison    FINDINGS:    CTA: The contrast bolus is satisfactory although the study is degraded by   severe respiratory motion. No filling defect in the pulmonary artery or   its branches to the lobar level. The heart is normal in size. There are   coronary artery calcifications. No pericardial effusion. The mid   ascending aorta measures 4 cm.    Lungs/Airways/Pleura: There are secretions within the trachea extending   into the right mainstem bronchus and right upper lobe bronchus as well as   areas of mucoid impaction in both lower lobes. There are extensive   centrilobular and branching linear densities and bronchocentric   groundglass densities throughout all 5 lobes. There is a 9 mm round   nodule in the left lower lobe on image 54 series 5. No pleural effusion.    Mediastinum/Lymph nodes: The esophagus is dilated and debris-filled to   the level of the thoracic inlet.    Upper Abdomen:Evaluation degraded by severe motion artifact.    Bones and Soft Tissues: No aggressive osseous lesions.    IMPRESSION:    1.  Study degraded by severe respiratory motion. No filling defect in the   pulmonary artery or its branches to the lobar level    2.  Findings throughout all 5 lobes favored to represent multifocal   aspiration given the dilated fluid-filled esophagus and degree of   endobronchial debris/secretions. A repeat chest CT scan is recommended in   6 weeks to ensure resolution    3.  Left lower lobe 9 mm nodule can be reassessed on the above   recommended follow-up      ACC: [id number] EXAM:  XR CHEST PORTABLE ROUTINE 1V                        ACC: [id number] EXAM:  XR CHEST PORTABLE URGENT 1V                        ACC: [id number] EXAM:  XR CHEST PORTABLE IMMED 1V                          PROCEDURE DATE:  2022          INTERPRETATION:  AP semierect chest on 2022 at 8:50 AM. Patient   is short of breath.    Heart magnified by technique.    There are significant scattered interstitial infiltrates throughout the   lungs suggesting multifocal pneumonia.    This pattern is increase from 2020.    Follow-up AP chest on 2022 at 6:17 AM. Infiltrates have   increased particularly in the upper lobes.    Follow-up AP chest on 2022 at 9:43 AM. Increasing lung   consolidation in the upper lobes.    IMPRESSION: Scattered lung infiltrates increased from prior. The last 2   films show progression of the infiltrates particularly in the upper lobes.        Assessment :  This is an 88 y/o F with PMH of HTN, Pre-DM, Dyslipidemia, AS, Parkinson's Disease, Alzheimer Dementia, valvuloplasty who was sent from her assisted living facility for respiratory distress & AMS,   recent covid 19   - covid 19 day 14 puts her out of window for antivirals  ct reviewed with infiltrates likely sec aspiration pneumonia  Doubt COVID 19 pneumonia  leukocytosis multifactorial including steroids - downtrending  Bacteremia with MRSE contaminant  FTT with poor po intake    Plan:  Cont zosyn   Strict aspiration precautions  Trend temps and cbc  Decadron short course x 5 days  Had lengthy d/w daughter she is leaning towards comfort care- No peg with poss pleasure feeds  Hospice eval      Continue with present regiment.  Appropriate use of antibiotics and adverse effects reviewed.    > 35 minutes were spent in direct patient care reviewing notes, medications ,labs data/ imaging , discussion with multidisciplinary team.    Thank you for allowing me to participate in care of your patient .    David George MD  Infectious Disease  [phone]
Patient is a 87y old  Female who presents with a chief complaint of Respiratory distress. (14 Jul 2022 07:36)      INTERVAL HPI/OVERNIGHT EVENTS:chart reviewed . d/w DR Cabrera,overnight events noted    Home Medications:  acetaminophen 325 mg oral tablet: 2 tab(s) orally every 6 hours, As needed, Temp greater or equal to 38C (100.4F), Mild Pain (1 - 3) (09 Oct 2020 11:56)  aspirin 81 mg oral delayed release tablet: 1 tab(s) orally once a day  Can resume on 10/12  (09 Oct 2020 11:56)  carbidopa-levodopa 25 mg-100 mg oral tablet: 1 tab(s) orally 3 times a day (08 Oct 2020 10:33)  donepezil 5 mg oral tablet: 1 tab(s) orally 2 times a day (08 Oct 2020 10:33)  melatonin 3 mg oral tablet: 1 tab(s) orally once a day (at bedtime) (09 Oct 2020 12:00)  memantine 10 mg oral tablet: 1 tab(s) orally 2 times a day (09 Oct 2020 12:00)  rosuvastatin 5 mg oral tablet: 1 tab(s) orally once a day (08 Oct 2020 10:33)  sertraline 100 mg oral tablet: 1 tab(s) orally once a day (08 Oct 2020 10:33)  Vitamin D3 2000 intl units (50 mcg) oral capsule: 1 cap(s) orally 3 times a week (08 Oct 2020 10:33)      MEDICATIONS  (STANDING):  amLODIPine   Tablet 5 milliGRAM(s) Oral daily  aspirin enteric coated 81 milliGRAM(s) Oral daily  atorvastatin 20 milliGRAM(s) Oral at bedtime  carbidopa/levodopa  25/100 1 Tablet(s) Oral three times a day  dextrose 5%. 1000 milliLiter(s) (50 mL/Hr) IV Continuous <Continuous>  dextrose 5%. 1000 milliLiter(s) (100 mL/Hr) IV Continuous <Continuous>  dextrose 50% Injectable 25 Gram(s) IV Push once  dextrose 50% Injectable 12.5 Gram(s) IV Push once  dextrose 50% Injectable 25 Gram(s) IV Push once  donepezil 5 milliGRAM(s) Oral daily  enoxaparin Injectable 40 milliGRAM(s) SubCutaneous every 24 hours  glucagon  Injectable 1 milliGRAM(s) IntraMuscular once  insulin lispro (ADMELOG) corrective regimen sliding scale   SubCutaneous Before meals and at bedtime  memantine 10 milliGRAM(s) Oral two times a day  piperacillin/tazobactam IVPB.. 3.375 Gram(s) IV Intermittent every 8 hours  potassium chloride  10 mEq/100 mL IVPB 10 milliEquivalent(s) IV Intermittent every 1 hour  sertraline 100 milliGRAM(s) Oral daily    MEDICATIONS  (PRN):  acetaminophen     Tablet .. 650 milliGRAM(s) Oral every 6 hours PRN Temp greater or equal to 38C (100.4F), Mild Pain (1 - 3)  ALBUTerol    0.083% 2.5 milliGRAM(s) Nebulizer every 8 hours PRN Shortness of Breath and/or Wheezing  dextrose Oral Gel 15 Gram(s) Oral once PRN Blood Glucose LESS THAN 70 milliGRAM(s)/deciliter  hydrALAZINE Injectable 5 milliGRAM(s) IV Push every 6 hours PRN SBP >160  melatonin 3 milliGRAM(s) Oral at bedtime PRN Insomnia  ondansetron Injectable 4 milliGRAM(s) IV Push every 6 hours PRN Nausea and/or Vomiting      Allergies    No Known Allergies    Intolerances        REVIEW OF SYSTEMS:unable as lethargic    Vital Signs Last 24 Hrs  T(C): 36.3 (14 Jul 2022 08:15), Max: 37.9 (13 Jul 2022 16:00)  T(F): 97.3 (14 Jul 2022 08:15), Max: 100.2 (13 Jul 2022 16:00)  HR: 79 (14 Jul 2022 08:00) (79 - 103)  BP: 145/53 (14 Jul 2022 08:00) (114/54 - 153/68)  BP(mean): 77 (14 Jul 2022 08:00) (72 - 120)  RR: 31 (14 Jul 2022 08:00) (16 - 50)  SpO2: 98% (14 Jul 2022 08:00) (96% - 100%)    Parameters below as of 14 Jul 2022 08:00  Patient On (Oxygen Delivery Method): nasal cannula  O2 Flow (L/min): 1      PHYSICAL EXAM:  GENERAL:  well-groomed, frail  HEAD:  Atraumatic, Normocephalic  EYES: EOMI, PERRLA, conjunctiva and sclera clear  ENMT: Moist mucous membranes,   NECK: Supple, No JVD, Normal thyroid  NERVOUS SYSTEM:  lethargic non focal  CHEST/LUNG: BS decreased at bases  HEART: Regular rate and rhythm; No murmurs, rubs, or gallops  ABDOMEN: Soft, Nontender, Nondistended; Bowel sounds present  EXTREMITIES:  2+ Peripheral Pulses, No clubbing, cyanosis, or edema    LABS:                        11.4   9.64  )-----------( 187      ( 14 Jul 2022 06:00 )             34.4     07-14    141  |  103  |  11  ----------------------------<  99  2.9<LL>   |  29  |  0.83    Ca    9.3      14 Jul 2022 06:00  Mg     1.9     07-14    TPro  6.3  /  Alb  2.4<L>  /  TBili  1.4<H>  /  DBili  0.3  /  AST  15  /  ALT  12  /  AlkPhos  88  07-14    PT/INR - ( 14 Jul 2022 06:00 )   PT: 15.4 sec;   INR: 1.30 ratio             CAPILLARY BLOOD GLUCOSE      POCT Blood Glucose.: 112 mg/dL (14 Jul 2022 07:52)  POCT Blood Glucose.: 107 mg/dL (13 Jul 2022 21:44)  POCT Blood Glucose.: 108 mg/dL (13 Jul 2022 17:41)  POCT Blood Glucose.: 110 mg/dL (13 Jul 2022 12:29)        Culture - Blood (collected 07-12-22 @ 06:00)  Source: .Blood Blood-Peripheral  Preliminary Report (07-13-22 @ 14:01):    No growth to date.    Culture - Urine (collected 07-09-22 @ 15:48)  Source: Clean Catch Clean Catch (Midstream)  Final Report (07-10-22 @ 21:23):    <10,000 CFU/mL Normal Urogenital Alida    Culture - Blood (collected 07-09-22 @ 15:48)  Source: .Blood Blood-Peripheral  Gram Stain (07-11-22 @ 06:46):    Growth in anaerobic bottle: Gram Positive Cocci in Clusters  Final Report (07-12-22 @ 13:33):    Growth in anaerobic bottle: Staphylococcus epidermidis    Coag Negative Staphylococcus    Single set isolate, possible contaminant. Contact    Microbiology if susceptibility testing clinically    indicated.    ***Blood Panel PCR results on this specimen are available    approximately 3 hours after the Gram stain result.***    Gram stain, PCR, and/or culture results may not always    correspond due to difference in methodologies.    ************************************************************    This PCR assay was performed by multiplex PCR. This    Assay tests for 66 bacterial and resistance gene targets.    Please refer to the [facility] test directory    at [email or web address] for details.  Organism: Blood Culture PCR (07-12-22 @ 13:33)  Organism: Blood Culture PCR (07-12-22 @ 13:33)      -  Staphylococcus epidermidis, Methicillin resistant: Detec      Method Type: PCR    Culture - Blood (collected 07-09-22 @ 15:48)  Source: .Blood Blood-Peripheral  Preliminary Report (07-11-22 @ 02:02):    No growth to date.        I&O's Summary    13 Jul 2022 07:01  -  14 Jul 2022 07:00  --------------------------------------------------------  IN: 0 mL / OUT: 1450 mL / NET: -1450 mL        RADIOLOGY & ADDITIONAL TESTS:    Imaging Personally Reviewed:  [ x] YES  [ ] NO    Consultant(s) Notes Reviewed:  [ x] YES  [ ] NO    Care Discussed with Consultants/Other Providers [x ] YES  [ ] NO
Subjective: Calm and resting in bed; Wet Cough. Does not verbalize any complaints.     MEDICATIONS  (STANDING):  amLODIPine   Tablet 5 milliGRAM(s) Oral daily  aspirin enteric coated 81 milliGRAM(s) Oral daily  atorvastatin 20 milliGRAM(s) Oral at bedtime  carbidopa/levodopa  25/100 1 Tablet(s) Oral three times a day  dexAMETHasone  Injectable 6 milliGRAM(s) IV Push daily  dextrose 5%. 1000 milliLiter(s) (50 mL/Hr) IV Continuous <Continuous>  dextrose 5%. 1000 milliLiter(s) (100 mL/Hr) IV Continuous <Continuous>  dextrose 50% Injectable 25 Gram(s) IV Push once  dextrose 50% Injectable 12.5 Gram(s) IV Push once  dextrose 50% Injectable 25 Gram(s) IV Push once  donepezil 5 milliGRAM(s) Oral daily  enoxaparin Injectable 40 milliGRAM(s) SubCutaneous every 24 hours  glucagon  Injectable 1 milliGRAM(s) IntraMuscular once  insulin lispro (ADMELOG) corrective regimen sliding scale   SubCutaneous Before meals and at bedtime  lactated ringers. 1000 milliLiter(s) (100 mL/Hr) IV Continuous <Continuous>  memantine 10 milliGRAM(s) Oral two times a day  piperacillin/tazobactam IVPB.. 3.375 Gram(s) IV Intermittent every 8 hours  sertraline 100 milliGRAM(s) Oral daily    MEDICATIONS  (PRN):  acetaminophen     Tablet .. 650 milliGRAM(s) Oral every 6 hours PRN Temp greater or equal to 38C (100.4F), Mild Pain (1 - 3)  ALBUTerol    0.083% 2.5 milliGRAM(s) Nebulizer every 8 hours PRN Shortness of Breath and/or Wheezing  dextrose Oral Gel 15 Gram(s) Oral once PRN Blood Glucose LESS THAN 70 milliGRAM(s)/deciliter  hydrALAZINE Injectable 5 milliGRAM(s) IV Push every 6 hours PRN SBP >160  melatonin 3 milliGRAM(s) Oral at bedtime PRN Insomnia  ondansetron Injectable 4 milliGRAM(s) IV Push every 6 hours PRN Nausea and/or Vomiting      Allergies    No Known Allergies    Intolerances        Vital Signs Last 24 Hrs  T(C): 36.7 (13 Jul 2022 04:00), Max: 36.7 (13 Jul 2022 04:00)  T(F): 98 (13 Jul 2022 04:00), Max: 98 (13 Jul 2022 04:00)  HR: 78 (13 Jul 2022 06:00) (71 - 100)  BP: 168/96 (13 Jul 2022 06:00) (94/80 - 168/96)  BP(mean): 114 (13 Jul 2022 06:00) (75 - 114)  RR: 31 (13 Jul 2022 06:00) (23 - 40)  SpO2: 100% (13 Jul 2022 06:00) (97% - 100%)    Parameters below as of 13 Jul 2022 04:00  Patient On (Oxygen Delivery Method): nasal cannula        PHYSICAL EXAM:  GENERAL: NAD,  HEAD:  Atraumatic, Normocephalic  ENMT: Moist mucous membranes,   NECK: Supple, No JVD, Normal thyroid  CHEST/LUNG: Coarse BS Bilaterally; Slight Crackles at bases   HEART: Regular rate and rhythm; No murmurs, rubs, or gallops  ABDOMEN: Soft, Nontender, Nondistended; Bowel sounds present  EXTREMITIES:  2+ Peripheral Pulses, No clubbing, cyanosis, or edema      LABS:                        10.9   12.56 )-----------( 207      ( 13 Jul 2022 06:15 )             33.0     13 Jul 2022 06:15    142    |  108    |  13     ----------------------------<  105    3.7     |  29     |  0.72     Ca    9.7        13 Jul 2022 06:15    TPro  5.8    /  Alb  2.3    /  TBili  0.9    /  DBili  0.2    /  AST  14     /  ALT  <10    /  AlkPhos  81     13 Jul 2022 06:15    PT/INR - ( 13 Jul 2022 06:15 )   PT: 13.1 sec;   INR: 1.14 ratio             CAPILLARY BLOOD GLUCOSE      POCT Blood Glucose.: 107 mg/dL (13 Jul 2022 05:32)  POCT Blood Glucose.: 119 mg/dL (12 Jul 2022 22:45)  POCT Blood Glucose.: 146 mg/dL (12 Jul 2022 16:52)  POCT Blood Glucose.: 171 mg/dL (12 Jul 2022 12:01)      RADIOLOGY & ADDITIONAL TESTS:    Imaging Personally Reviewed:  [ ] YES     Consultant(s) Notes Reviewed:      Care Discussed with Consultants/Other Providers:    Advanced Directives: [ ] DNR  [ ] No feeding tube  [ ] MOLST in chart  [ ] MOLST completed today  [ ] Unknown  
Date/Time Patient Seen:  		  Referring MD:   Data Reviewed	       Patient is a 87y old  Female who presents with a chief complaint of Respiratory distress. (11 Jul 2022 14:40)      Subjective/HPI     PAST MEDICAL & SURGICAL HISTORY:  Cholesterolosis    Alzheimer&#x27;s dementia without behavioral disturbance, unspecified timing of dementia onset    Depression, unspecified depression type    Parkinsonism, unspecified Parkinsonism type    Falls frequently    No significant past surgical history          Medication list         MEDICATIONS  (STANDING):  amLODIPine   Tablet 5 milliGRAM(s) Oral daily  aspirin enteric coated 81 milliGRAM(s) Oral daily  atorvastatin 20 milliGRAM(s) Oral at bedtime  carbidopa/levodopa  25/100 1 Tablet(s) Oral three times a day  dexAMETHasone  Injectable 6 milliGRAM(s) IV Push daily  dextrose 5%. 1000 milliLiter(s) (50 mL/Hr) IV Continuous <Continuous>  dextrose 5%. 1000 milliLiter(s) (100 mL/Hr) IV Continuous <Continuous>  dextrose 50% Injectable 25 Gram(s) IV Push once  dextrose 50% Injectable 12.5 Gram(s) IV Push once  dextrose 50% Injectable 25 Gram(s) IV Push once  donepezil 5 milliGRAM(s) Oral daily  enoxaparin Injectable 40 milliGRAM(s) SubCutaneous every 24 hours  glucagon  Injectable 1 milliGRAM(s) IntraMuscular once  insulin lispro (ADMELOG) corrective regimen sliding scale   SubCutaneous Before meals and at bedtime  lactated ringers. 1000 milliLiter(s) (100 mL/Hr) IV Continuous <Continuous>  memantine 10 milliGRAM(s) Oral two times a day  piperacillin/tazobactam IVPB.. 3.375 Gram(s) IV Intermittent every 8 hours  sertraline 100 milliGRAM(s) Oral daily    MEDICATIONS  (PRN):  acetaminophen     Tablet .. 650 milliGRAM(s) Oral every 6 hours PRN Temp greater or equal to 38C (100.4F), Mild Pain (1 - 3)  ALBUTerol    0.083% 2.5 milliGRAM(s) Nebulizer every 8 hours PRN Shortness of Breath and/or Wheezing  dextrose Oral Gel 15 Gram(s) Oral once PRN Blood Glucose LESS THAN 70 milliGRAM(s)/deciliter  hydrALAZINE Injectable 5 milliGRAM(s) IV Push every 6 hours PRN SBP >160  melatonin 3 milliGRAM(s) Oral at bedtime PRN Insomnia  ondansetron Injectable 4 milliGRAM(s) IV Push every 6 hours PRN Nausea and/or Vomiting         Vitals log        ICU Vital Signs Last 24 Hrs  T(C): 36.9 (12 Jul 2022 05:22), Max: 36.9 (12 Jul 2022 05:22)  T(F): 98.4 (12 Jul 2022 05:22), Max: 98.4 (12 Jul 2022 05:22)  HR: 109 (12 Jul 2022 06:05) (91 - 109)  BP: 168/55 (12 Jul 2022 06:05) (109/50 - 169/74)  BP(mean): 85 (12 Jul 2022 06:05) (68 - 95)  ABP: --  ABP(mean): --  RR: 27 (12 Jul 2022 06:05) (24 - 33)  SpO2: 94% (12 Jul 2022 06:05) (92% - 100%)    O2 Parameters below as of 12 Jul 2022 06:05  Patient On (Oxygen Delivery Method): nasal cannula  O2 Flow (L/min): 2               Input and Output:  I&O's Detail    11 Jul 2022 07:01  -  12 Jul 2022 07:00  --------------------------------------------------------  IN:    IV PiggyBack: 950 mL    Lactated Ringers: 2400 mL  Total IN: 3350 mL    OUT:    Voided (mL): 700 mL  Total OUT: 700 mL    Total NET: 2650 mL          Lab Data                        11.2   17.09 )-----------( 211      ( 12 Jul 2022 06:00 )             33.7     07-11    x   |  x   |  x   ----------------------------<  x   3.7   |  x   |  x     Ca    9.5      11 Jul 2022 12:00  Phos  1.9     07-11  Mg     1.7     07-11    TPro  6.3  /  Alb  2.7<L>  /  TBili  1.8<H>  /  DBili  0.5<H>  /  AST  17  /  ALT  <10<L>  /  AlkPhos  90  07-11            Review of Systems	      Objective     Physical Examination    heart s1s2  lung dec BS  head nc      Pertinent Lab findings & Imaging      Alireza:  NO   Adequate UO     I&O's Detail    11 Jul 2022 07:01  -  12 Jul 2022 07:00  --------------------------------------------------------  IN:    IV PiggyBack: 950 mL    Lactated Ringers: 2400 mL  Total IN: 3350 mL    OUT:    Voided (mL): 700 mL  Total OUT: 700 mL    Total NET: 2650 mL               Discussed with:     Cultures:	        Radiology                            
Date/Time Patient Seen:  		  Referring MD:   Data Reviewed	       Patient is a 87y old  Female who presents with a chief complaint of Respiratory distress. (12 Jul 2022 22:32)      Subjective/HPI     PAST MEDICAL & SURGICAL HISTORY:  Cholesterolosis    Alzheimer&#x27;s dementia without behavioral disturbance, unspecified timing of dementia onset    Depression, unspecified depression type    Parkinsonism, unspecified Parkinsonism type    Falls frequently    No significant past surgical history          Medication list         MEDICATIONS  (STANDING):  amLODIPine   Tablet 5 milliGRAM(s) Oral daily  aspirin enteric coated 81 milliGRAM(s) Oral daily  atorvastatin 20 milliGRAM(s) Oral at bedtime  carbidopa/levodopa  25/100 1 Tablet(s) Oral three times a day  dexAMETHasone  Injectable 6 milliGRAM(s) IV Push daily  dextrose 5%. 1000 milliLiter(s) (50 mL/Hr) IV Continuous <Continuous>  dextrose 5%. 1000 milliLiter(s) (100 mL/Hr) IV Continuous <Continuous>  dextrose 50% Injectable 25 Gram(s) IV Push once  dextrose 50% Injectable 12.5 Gram(s) IV Push once  dextrose 50% Injectable 25 Gram(s) IV Push once  donepezil 5 milliGRAM(s) Oral daily  enoxaparin Injectable 40 milliGRAM(s) SubCutaneous every 24 hours  glucagon  Injectable 1 milliGRAM(s) IntraMuscular once  insulin lispro (ADMELOG) corrective regimen sliding scale   SubCutaneous Before meals and at bedtime  lactated ringers. 1000 milliLiter(s) (100 mL/Hr) IV Continuous <Continuous>  memantine 10 milliGRAM(s) Oral two times a day  piperacillin/tazobactam IVPB.. 3.375 Gram(s) IV Intermittent every 8 hours  sertraline 100 milliGRAM(s) Oral daily    MEDICATIONS  (PRN):  acetaminophen     Tablet .. 650 milliGRAM(s) Oral every 6 hours PRN Temp greater or equal to 38C (100.4F), Mild Pain (1 - 3)  ALBUTerol    0.083% 2.5 milliGRAM(s) Nebulizer every 8 hours PRN Shortness of Breath and/or Wheezing  dextrose Oral Gel 15 Gram(s) Oral once PRN Blood Glucose LESS THAN 70 milliGRAM(s)/deciliter  hydrALAZINE Injectable 5 milliGRAM(s) IV Push every 6 hours PRN SBP >160  melatonin 3 milliGRAM(s) Oral at bedtime PRN Insomnia  ondansetron Injectable 4 milliGRAM(s) IV Push every 6 hours PRN Nausea and/or Vomiting         Vitals log        ICU Vital Signs Last 24 Hrs  T(C): 36.7 (13 Jul 2022 04:00), Max: 37.1 (12 Jul 2022 08:00)  T(F): 98 (13 Jul 2022 04:00), Max: 98.7 (12 Jul 2022 08:00)  HR: 78 (13 Jul 2022 06:00) (71 - 100)  BP: 168/96 (13 Jul 2022 06:00) (94/80 - 168/96)  BP(mean): 114 (13 Jul 2022 06:00) (75 - 114)  ABP: --  ABP(mean): --  RR: 31 (13 Jul 2022 06:00) (23 - 40)  SpO2: 100% (13 Jul 2022 06:00) (97% - 100%)    O2 Parameters below as of 13 Jul 2022 04:00  Patient On (Oxygen Delivery Method): nasal cannula                 Input and Output:  I&O's Detail    11 Jul 2022 07:01  -  12 Jul 2022 07:00  --------------------------------------------------------  IN:    IV PiggyBack: 950 mL    Lactated Ringers: 2400 mL  Total IN: 3350 mL    OUT:    Voided (mL): 700 mL  Total OUT: 700 mL    Total NET: 2650 mL      12 Jul 2022 07:01  -  13 Jul 2022 06:46  --------------------------------------------------------  IN:    IV PiggyBack: 100 mL    Lactated Ringers: 1100 mL  Total IN: 1200 mL    OUT:    Voided (mL): 800 mL  Total OUT: 800 mL    Total NET: 400 mL          Lab Data                        10.9   12.56 )-----------( 207      ( 13 Jul 2022 06:15 )             33.0     07-12    143  |  107  |  13  ----------------------------<  126<H>  3.4<L>   |  27  |  0.69    Ca    9.4      12 Jul 2022 06:00  Phos  1.9     07-11  Mg     2.0     07-12    TPro  6.0  /  Alb  2.3<L>  /  TBili  1.2  /  DBili  0.3  /  AST  12  /  ALT  <10<L>  /  AlkPhos  91  07-12            Review of Systems	      Objective     Physical Examination    heart s1s2  lung dc BS  abd soft      Pertinent Lab findings & Imaging      Alireza:  NO   Adequate UO     I&O's Detail    11 Jul 2022 07:01  -  12 Jul 2022 07:00  --------------------------------------------------------  IN:    IV PiggyBack: 950 mL    Lactated Ringers: 2400 mL  Total IN: 3350 mL    OUT:    Voided (mL): 700 mL  Total OUT: 700 mL    Total NET: 2650 mL      12 Jul 2022 07:01  -  13 Jul 2022 06:46  --------------------------------------------------------  IN:    IV PiggyBack: 100 mL    Lactated Ringers: 1100 mL  Total IN: 1200 mL    OUT:    Voided (mL): 800 mL  Total OUT: 800 mL    Total NET: 400 mL               Discussed with:     Cultures:	        Radiology                            
Date/Time Patient Seen:  		  Referring MD:   Data Reviewed	       Patient is a 87y old  Female who presents with a chief complaint of Respiratory distress. (13 Jul 2022 14:37)      Subjective/HPI     PAST MEDICAL & SURGICAL HISTORY:  Cholesterolosis    Alzheimer&#x27;s dementia without behavioral disturbance, unspecified timing of dementia onset    Depression, unspecified depression type    Parkinsonism, unspecified Parkinsonism type    Falls frequently    No significant past surgical history          Medication list         MEDICATIONS  (STANDING):  amLODIPine   Tablet 5 milliGRAM(s) Oral daily  aspirin enteric coated 81 milliGRAM(s) Oral daily  atorvastatin 20 milliGRAM(s) Oral at bedtime  carbidopa/levodopa  25/100 1 Tablet(s) Oral three times a day  dextrose 5%. 1000 milliLiter(s) (50 mL/Hr) IV Continuous <Continuous>  dextrose 5%. 1000 milliLiter(s) (100 mL/Hr) IV Continuous <Continuous>  dextrose 50% Injectable 25 Gram(s) IV Push once  dextrose 50% Injectable 12.5 Gram(s) IV Push once  dextrose 50% Injectable 25 Gram(s) IV Push once  donepezil 5 milliGRAM(s) Oral daily  enoxaparin Injectable 40 milliGRAM(s) SubCutaneous every 24 hours  glucagon  Injectable 1 milliGRAM(s) IntraMuscular once  insulin lispro (ADMELOG) corrective regimen sliding scale   SubCutaneous Before meals and at bedtime  memantine 10 milliGRAM(s) Oral two times a day  piperacillin/tazobactam IVPB.. 3.375 Gram(s) IV Intermittent every 8 hours  sertraline 100 milliGRAM(s) Oral daily    MEDICATIONS  (PRN):  acetaminophen     Tablet .. 650 milliGRAM(s) Oral every 6 hours PRN Temp greater or equal to 38C (100.4F), Mild Pain (1 - 3)  ALBUTerol    0.083% 2.5 milliGRAM(s) Nebulizer every 8 hours PRN Shortness of Breath and/or Wheezing  dextrose Oral Gel 15 Gram(s) Oral once PRN Blood Glucose LESS THAN 70 milliGRAM(s)/deciliter  hydrALAZINE Injectable 5 milliGRAM(s) IV Push every 6 hours PRN SBP >160  melatonin 3 milliGRAM(s) Oral at bedtime PRN Insomnia  ondansetron Injectable 4 milliGRAM(s) IV Push every 6 hours PRN Nausea and/or Vomiting         Vitals log        ICU Vital Signs Last 24 Hrs  T(C): 37.2 (14 Jul 2022 06:00), Max: 37.9 (13 Jul 2022 16:00)  T(F): 99 (14 Jul 2022 06:00), Max: 100.2 (13 Jul 2022 16:00)  HR: 83 (14 Jul 2022 07:00) (82 - 103)  BP: 147/57 (14 Jul 2022 07:00) (114/54 - 164/76)  BP(mean): 83 (14 Jul 2022 07:00) (72 - 120)  ABP: --  ABP(mean): --  RR: 26 (14 Jul 2022 07:00) (16 - 50)  SpO2: 100% (14 Jul 2022 07:00) (91% - 100%)    O2 Parameters below as of 14 Jul 2022 06:00  Patient On (Oxygen Delivery Method): nasal cannula  O2 Flow (L/min): 3               Input and Output:  I&O's Detail    13 Jul 2022 07:01  -  14 Jul 2022 07:00  --------------------------------------------------------  IN:  Total IN: 0 mL    OUT:    Voided (mL): 1450 mL  Total OUT: 1450 mL    Total NET: -1450 mL          Lab Data                        11.4   9.64  )-----------( 187      ( 14 Jul 2022 06:00 )             34.4     07-14    141  |  103  |  11  ----------------------------<  99  2.9<LL>   |  29  |  0.83    Ca    9.3      14 Jul 2022 06:00  Mg     1.9     07-14    TPro  6.3  /  Alb  2.4<L>  /  TBili  1.4<H>  /  DBili  0.3  /  AST  15  /  ALT  12  /  AlkPhos  88  07-14            Review of Systems	      Objective     Physical Examination  heart s1s2  lung dc BS  abd soft        Pertinent Lab findings & Imaging      Alireza:  NO   Adequate UO     I&O's Detail    13 Jul 2022 07:01  -  14 Jul 2022 07:00  --------------------------------------------------------  IN:  Total IN: 0 mL    OUT:    Voided (mL): 1450 mL  Total OUT: 1450 mL    Total NET: -1450 mL               Discussed with:     Cultures:	        Radiology                            
Patient is a 87y old  Female who presents with a chief complaint of Respiratory distress. (2022 07:19)        HPI:  This is an 88 y/o F with PMH of HTN, Pre-DM, Dyslipidemia, AS, Parkinson's Disease, Alzheimer Dementia, Anxiety, and Depression who was sent from her assisted living facility for respiratory distress & AMS, was reported to have thick secretions in her throat with dropping of her SPO2 to the low 70's, improved with suctioning & oxygen supplementation to upper 80's & low 90's, no reported fever. Patient was tested positive for COVID-19 about 2 weeks ago. At the ED her CTA showed no PE to the level of lobes, but showed evidence suggestive of aspiration PNA. Patient is non verbal, no further history could be obtained at this time.  (2022 22:39)      SUBJECTIVE & OBJECTIVE: Pt seen and examined at bedside. alert, confused, with severe hypokalemia K 2.3     PHYSICAL EXAM:  T(C): 37 (22 @ 05:52), Max: 37.7 (07-10-22 @ 11:00)  HR: 97 (22 @ 06:00) (73 - 97)  BP: 133/48 (22 @ 06:00) (116/75 - 189/61)  RR: 33 (22 @ 06:00) (18 - 33)  SpO2: 93% (22 @ 06:00) (92% - 100%)  Wt(kg): --   GENERAL: NAD,   NECK: Supple, No JVD  NERVOUS SYSTEM:  Alert &  CHEST/LUNG: decrease air entry at the bases   HEART: Regular rate and rhythm; No murmurs, rubs, or gallops  ABDOMEN: Soft, Nontender, Nondistended; Bowel sounds present  EXTREMITIES:  2+ Peripheral Pulses, No clubbing, cyanosis, or edema        MEDICATIONS  (STANDING):  amLODIPine   Tablet 5 milliGRAM(s) Oral daily  aspirin enteric coated 81 milliGRAM(s) Oral daily  atorvastatin 20 milliGRAM(s) Oral at bedtime  carbidopa/levodopa  25/100 1 Tablet(s) Oral three times a day  dexAMETHasone  Injectable 6 milliGRAM(s) IV Push daily  dextrose 5%. 1000 milliLiter(s) (50 mL/Hr) IV Continuous <Continuous>  dextrose 5%. 1000 milliLiter(s) (100 mL/Hr) IV Continuous <Continuous>  dextrose 50% Injectable 25 Gram(s) IV Push once  dextrose 50% Injectable 12.5 Gram(s) IV Push once  dextrose 50% Injectable 25 Gram(s) IV Push once  donepezil 5 milliGRAM(s) Oral daily  enoxaparin Injectable 40 milliGRAM(s) SubCutaneous every 24 hours  glucagon  Injectable 1 milliGRAM(s) IntraMuscular once  insulin lispro (ADMELOG) corrective regimen sliding scale   SubCutaneous every 6 hours  lactated ringers. 1000 milliLiter(s) (100 mL/Hr) IV Continuous <Continuous>  memantine 10 milliGRAM(s) Oral two times a day  piperacillin/tazobactam IVPB.. 3.375 Gram(s) IV Intermittent every 8 hours  potassium chloride    Tablet ER 40 milliEquivalent(s) Oral every 4 hours  sertraline 100 milliGRAM(s) Oral daily  vancomycin  IVPB      vancomycin  IVPB 1000 milliGRAM(s) IV Intermittent once    MEDICATIONS  (PRN):  acetaminophen     Tablet .. 650 milliGRAM(s) Oral every 6 hours PRN Temp greater or equal to 38C (100.4F), Mild Pain (1 - 3)  ALBUTerol    0.083% 2.5 milliGRAM(s) Nebulizer every 8 hours PRN Shortness of Breath and/or Wheezing  dextrose Oral Gel 15 Gram(s) Oral once PRN Blood Glucose LESS THAN 70 milliGRAM(s)/deciliter  hydrALAZINE Injectable 5 milliGRAM(s) IV Push every 6 hours PRN SBP >160  melatonin 3 milliGRAM(s) Oral at bedtime PRN Insomnia  ondansetron Injectable 4 milliGRAM(s) IV Push every 6 hours PRN Nausea and/or Vomiting      LABS:                        12.3   17.76 )-----------( 190      ( 2022 06:51 )             36.7     07-11    148<H>  |  110<H>  |  15  ----------------------------<  98  2.3<LL>   |  27  |  0.71    Ca    9.7      2022 06:51  Phos  1.9     07-11  Mg     1.7     07-11    TPro  6.3  /  Alb  2.7<L>  /  TBili  1.8<H>  /  DBili  0.5<H>  /  AST  17  /  ALT  <10<L>  /  AlkPhos  90  -11    PT/INR - ( 2022 06:51 )   PT: 18.1 sec;   INR: 1.57 ratio         PTT - ( 2022 15:48 )  PTT:23.5 sec  Urinalysis Basic - ( 2022 15:48 )    Color: Yellow / Appearance: Clear / S.030 / pH: x  Gluc: x / Ketone: Negative  / Bili: Negative / Urobili: Negative mg/dL   Blood: x / Protein: 100 mg/dL / Nitrite: Negative   Leuk Esterase: Trace / RBC: 11-25 /HPF / WBC 6-10   Sq Epi: x / Non Sq Epi: Few / Bacteria: Moderate      Magnesium, Serum: 1.7 mg/dL ( @ 06:51)    CAPILLARY BLOOD GLUCOSE      POCT Blood Glucose.: 146 mg/dL (2022 05:08)  POCT Blood Glucose.: 211 mg/dL (10 Jul 2022 23:06)  POCT Blood Glucose.: 104 mg/dL (10 Jul 2022 12:18)      CAPILLARY BLOOD GLUCOSE      POCT Blood Glucose.: 146 mg/dL (2022 05:08)  POCT Blood Glucose.: 211 mg/dL (10 Jul 2022 23:06)  POCT Blood Glucose.: 104 mg/dL (10 Jul 2022 12:18)    CAPILLARY BLOOD GLUCOSE      POCT Blood Glucose.: 146 mg/dL (2022 05:08)    ABG - ( 2022 20:31 )  pH, Arterial: 7.37  pH, Blood: x     /  pCO2: 39    /  pO2: 108   / HCO3: 22    / Base Excess: -2.8  /  SaO2: 99.1                    RECENT CULTURES:      RADIOLOGY & ADDITIONAL TESTS:                        DVT/GI ppx  Discussed with pt @ bedside
Patient is a 87y old  Female who presents with a chief complaint of Respiratory distress. (10 Jul 2022 13:20)        HPI:  This is an 86 y/o F with PMH of HTN, Pre-DM, Dyslipidemia, AS, Parkinson's Disease, Alzheimer Dementia, Anxiety, and Depression who was sent from her assisted living facility for respiratory distress & AMS, was reported to have thick secretions in her throat with dropping of her SPO2 to the low 70's, improved with suctioning & oxygen supplementation to upper 80's & low 90's, no reported fever. Patient was tested positive for COVID-19 about 2 weeks ago. At the ED her CTA showed no PE to the level of lobes, but showed evidence suggestive of aspiration PNA. Patient is non verbal, no further history could be obtained at this time.  (2022 22:39)      SUBJECTIVE & OBJECTIVE: Pt seen and examined at bedside. dyspnea     PHYSICAL EXAM:  T(C): 36.5 (07-10-22 @ 12:14), Max: 37.7 (07-10-22 @ 11:00)  HR: 92 (07-10-22 @ 14:00) (72 - 101)  BP: 116/75 (07-10-22 @ 14:00) (116/75 - 219/92)  RR: 24 (07-10-22 @ 14:00) (1 - 48)  SpO2: 97% (07-10-22 @ 14:00) (71% - 100%)  Wt(kg): -- Height (cm): 167.6 ( @ 15:09)  Weight (kg): 72.6 ( @ 15:09)  BMI (kg/m2): 25.8 ( @ 15:09)  BSA (m2): 1.82 ( @ 15:09)  GENERAL: NAD, well-groomed, well-developed  HEAD:  Atraumatic, Normocephalic  NERVOUS SYSTEM:  Alert & Oriented X3, CHEST/LUNG: Clear to auscultation bilaterally; No rales, rhonchi, wheezing, or rubs  HEART: Regular rate and rhythm; No murmurs, rubs, or gallops  ABDOMEN: Soft, Nontender, Nondistended; Bowel sounds present  EXTREMITIES:  2+ Peripheral Pulses, No clubbing, cyanosis, or edema        MEDICATIONS  (STANDING):  amLODIPine   Tablet 5 milliGRAM(s) Oral once  aspirin enteric coated 81 milliGRAM(s) Oral daily  atorvastatin 20 milliGRAM(s) Oral at bedtime  carbidopa/levodopa  25/100 1 Tablet(s) Oral three times a day  dexAMETHasone  Injectable 6 milliGRAM(s) IV Push daily  dextrose 5%. 1000 milliLiter(s) (50 mL/Hr) IV Continuous <Continuous>  dextrose 5%. 1000 milliLiter(s) (100 mL/Hr) IV Continuous <Continuous>  dextrose 50% Injectable 25 Gram(s) IV Push once  dextrose 50% Injectable 12.5 Gram(s) IV Push once  dextrose 50% Injectable 25 Gram(s) IV Push once  donepezil 5 milliGRAM(s) Oral daily  enoxaparin Injectable 40 milliGRAM(s) SubCutaneous every 24 hours  glucagon  Injectable 1 milliGRAM(s) IntraMuscular once  insulin lispro (ADMELOG) corrective regimen sliding scale   SubCutaneous every 6 hours  lactated ringers. 1000 milliLiter(s) (100 mL/Hr) IV Continuous <Continuous>  memantine 10 milliGRAM(s) Oral two times a day  piperacillin/tazobactam IVPB.. 3.375 Gram(s) IV Intermittent every 8 hours  sertraline 100 milliGRAM(s) Oral daily    MEDICATIONS  (PRN):  acetaminophen     Tablet .. 650 milliGRAM(s) Oral every 6 hours PRN Temp greater or equal to 38C (100.4F), Mild Pain (1 - 3)  ALBUTerol    0.083% 2.5 milliGRAM(s) Nebulizer every 8 hours PRN Shortness of Breath and/or Wheezing  dextrose Oral Gel 15 Gram(s) Oral once PRN Blood Glucose LESS THAN 70 milliGRAM(s)/deciliter  hydrALAZINE Injectable 5 milliGRAM(s) IV Push every 6 hours PRN SBP >160  melatonin 3 milliGRAM(s) Oral at bedtime PRN Insomnia  ondansetron Injectable 4 milliGRAM(s) IV Push every 6 hours PRN Nausea and/or Vomiting      LABS:                        12.4   13.63 )-----------( 213      ( 2022 15:48 )             38.9     07-10    x   |  x   |  x   ----------------------------<  x   x    |  x   |  0.89    Ca    9.1      2022 16:19    TPro  6.8  /  Alb  3.1<L>  /  TBili  1.1  /  DBili  0.3  /  AST  16  /  ALT  15  /  AlkPhos  96  07-10    PT/INR - ( 10 Jul 2022 05:59 )   PT: 14.2 sec;   INR: 1.20 ratio         PTT - ( 2022 15:48 )  PTT:23.5 sec  Urinalysis Basic - ( 2022 15:48 )    Color: Yellow / Appearance: Clear / S.030 / pH: x  Gluc: x / Ketone: Negative  / Bili: Negative / Urobili: Negative mg/dL   Blood: x / Protein: 100 mg/dL / Nitrite: Negative   Leuk Esterase: Trace / RBC: 11-25 /HPF / WBC 6-10   Sq Epi: x / Non Sq Epi: Few / Bacteria: Moderate        CAPILLARY BLOOD GLUCOSE      POCT Blood Glucose.: 104 mg/dL (10 Jul 2022 12:18)  POCT Blood Glucose.: 150 mg/dL (10 Jul 2022 06:08)  POCT Blood Glucose.: 169 mg/dL (10 Jul 2022 01:00)      CAPILLARY BLOOD GLUCOSE      POCT Blood Glucose.: 104 mg/dL (10 Jul 2022 12:18)  POCT Blood Glucose.: 150 mg/dL (10 Jul 2022 06:08)  POCT Blood Glucose.: 169 mg/dL (10 Jul 2022 01:00)    CAPILLARY BLOOD GLUCOSE      POCT Blood Glucose.: 104 mg/dL (10 Jul 2022 12:18)    ABG - ( 2022 20:31 )  pH, Arterial: 7.37  pH, Blood: x     /  pCO2: 39    /  pO2: 108   / HCO3: 22    / Base Excess: -2.8  /  SaO2: 99.1                    RECENT CULTURES:      RADIOLOGY & ADDITIONAL TESTS:                        DVT/GI ppx  Discussed with pt @ bedside
Patient is a 87y old  Female who presents with a chief complaint of Respiratory distress. (12 Jul 2022 07:20)        HPI:  This is an 86 y/o F with PMH of HTN, Pre-DM, Dyslipidemia, AS, Parkinson's Disease, Alzheimer Dementia, Anxiety, and Depression who was sent from her assisted living facility for respiratory distress & AMS, was reported to have thick secretions in her throat with dropping of her SPO2 to the low 70's, improved with suctioning & oxygen supplementation to upper 80's & low 90's, no reported fever. Patient was tested positive for COVID-19 about 2 weeks ago. At the ED her CTA showed no PE to the level of lobes, but showed evidence suggestive of aspiration PNA. Patient is non verbal, no further history could be obtained at this time.  (09 Jul 2022 22:39)      SUBJECTIVE & OBJECTIVE: Pt seen and examined at bedside. nad    PHYSICAL EXAM:  T(C): 37.1 (07-12-22 @ 08:00), Max: 37.1 (07-12-22 @ 08:00)  HR: 97 (07-12-22 @ 08:00) (91 - 109)  BP: 124/59 (07-12-22 @ 08:00) (109/50 - 169/74)  RR: 28 (07-12-22 @ 08:00) (24 - 33)  SpO2: 98% (07-12-22 @ 08:00) (92% - 100%)  Wt(kg): --   GENERAL: confused/  HEAD:  Atraumatic, Normocephalic  NECK: Supple, No JVD  NERVOUS SYSTEM:  Alert  CHEST/LUNG: decrease air entry at the bases   HEART: Regular rate and rhythm; No murmurs, rubs, or gallops  ABDOMEN: Soft, Nontender, Nondistended; Bowel sounds present  EXTREMITIES:  2+ Peripheral Pulses, No clubbing, cyanosis, or edema        MEDICATIONS  (STANDING):  amLODIPine   Tablet 5 milliGRAM(s) Oral daily  aspirin enteric coated 81 milliGRAM(s) Oral daily  atorvastatin 20 milliGRAM(s) Oral at bedtime  carbidopa/levodopa  25/100 1 Tablet(s) Oral three times a day  dexAMETHasone  Injectable 6 milliGRAM(s) IV Push daily  dextrose 5%. 1000 milliLiter(s) (50 mL/Hr) IV Continuous <Continuous>  dextrose 5%. 1000 milliLiter(s) (100 mL/Hr) IV Continuous <Continuous>  dextrose 50% Injectable 25 Gram(s) IV Push once  dextrose 50% Injectable 12.5 Gram(s) IV Push once  dextrose 50% Injectable 25 Gram(s) IV Push once  donepezil 5 milliGRAM(s) Oral daily  enoxaparin Injectable 40 milliGRAM(s) SubCutaneous every 24 hours  glucagon  Injectable 1 milliGRAM(s) IntraMuscular once  insulin lispro (ADMELOG) corrective regimen sliding scale   SubCutaneous Before meals and at bedtime  lactated ringers. 1000 milliLiter(s) (100 mL/Hr) IV Continuous <Continuous>  memantine 10 milliGRAM(s) Oral two times a day  piperacillin/tazobactam IVPB.. 3.375 Gram(s) IV Intermittent every 8 hours  sertraline 100 milliGRAM(s) Oral daily    MEDICATIONS  (PRN):  acetaminophen     Tablet .. 650 milliGRAM(s) Oral every 6 hours PRN Temp greater or equal to 38C (100.4F), Mild Pain (1 - 3)  ALBUTerol    0.083% 2.5 milliGRAM(s) Nebulizer every 8 hours PRN Shortness of Breath and/or Wheezing  dextrose Oral Gel 15 Gram(s) Oral once PRN Blood Glucose LESS THAN 70 milliGRAM(s)/deciliter  hydrALAZINE Injectable 5 milliGRAM(s) IV Push every 6 hours PRN SBP >160  melatonin 3 milliGRAM(s) Oral at bedtime PRN Insomnia  ondansetron Injectable 4 milliGRAM(s) IV Push every 6 hours PRN Nausea and/or Vomiting      LABS:                        11.2   17.09 )-----------( 211      ( 12 Jul 2022 06:00 )             33.7     07-12    143  |  107  |  13  ----------------------------<  126<H>  3.4<L>   |  27  |  0.69    Ca    9.4      12 Jul 2022 06:00  Phos  1.9     07-11  Mg     2.0     07-12    TPro  6.0  /  Alb  2.3<L>  /  TBili  1.2  /  DBili  x   /  AST  12  /  ALT  <10<L>  /  AlkPhos  91  07-12    PT/INR - ( 12 Jul 2022 06:00 )   PT: 15.3 sec;   INR: 1.33 ratio             Magnesium, Serum: 2.0 mg/dL (07-12 @ 06:00)    CAPILLARY BLOOD GLUCOSE      POCT Blood Glucose.: 130 mg/dL (12 Jul 2022 07:52)  POCT Blood Glucose.: 123 mg/dL (11 Jul 2022 21:29)  POCT Blood Glucose.: 104 mg/dL (11 Jul 2022 17:32)  POCT Blood Glucose.: 212 mg/dL (11 Jul 2022 11:59)      CAPILLARY BLOOD GLUCOSE      POCT Blood Glucose.: 130 mg/dL (12 Jul 2022 07:52)  POCT Blood Glucose.: 123 mg/dL (11 Jul 2022 21:29)  POCT Blood Glucose.: 104 mg/dL (11 Jul 2022 17:32)  POCT Blood Glucose.: 212 mg/dL (11 Jul 2022 11:59)    CAPILLARY BLOOD GLUCOSE      POCT Blood Glucose.: 130 mg/dL (12 Jul 2022 07:52)            RECENT CULTURES:      RADIOLOGY & ADDITIONAL TESTS:                        DVT/GI ppx  Discussed with pt @ bedside

## 2022-07-14 NOTE — CONSULT NOTE ADULT - SUBJECTIVE AND OBJECTIVE BOX
HPI:  This is an 88 y/o F with PMH of HTN, Pre-DM, Dyslipidemia, AS, Parkinson's Disease, Alzheimer Dementia, Anxiety, and Depression who was sent from her assisted living facility for respiratory distress & AMS, was reported to have thick secretions in her throat with dropping of her SPO2 to the low 70's, improved with suctioning & oxygen supplementation to upper 80's & low 90's, no reported fever. Patient was tested positive for COVID-19 about 2 weeks ago. At the ED her CTA showed no PE to the level of lobes, but showed evidence suggestive of aspiration PNA. Patient is non verbal, no further history could be obtained at this time.  (09 Jul 2022 22:39)    PERTINENT PM/SXH:   Cholesterolosis    Alzheimer&#x27;s dementia without behavioral disturbance, unspecified timing of dementia onset    Depression, unspecified depression type    Parkinsonism, unspecified Parkinsonism type    Falls frequently      No significant past surgical history      FAMILY HISTORY:  No pertinent family history in first degree relatives      ITEMS NOT CHECKED ARE NOT PRESENT    SOCIAL HISTORY:   Significant other/partner[ ]  Children[ ]  Restoration/Spirituality:  Substance hx:  [ ]   Tobacco hx:  [ ]   Alcohol hx: [ ]   Home Opioid hx:  [ ] I-Stop Reference No:  Living Situation: [ ]Home  [ ]Long term care  [ ]Rehab [ ]Other    ADVANCE DIRECTIVES:    DNR  MOLST  [ ]  Living Will  [ ]   DECISION MAKER(s):  [ ] Health Care Proxy(s)  [ ] Surrogate(s)  [ ] Guardian           Name(s): Phone Number(s):    BASELINE (I)ADL(s) (prior to admission):  Jamul: [ ]Total  [ ] Moderate [ ]Dependent    Allergies    No Known Allergies    Intolerances    MEDICATIONS  (STANDING):  carbidopa/levodopa  25/100 1 Tablet(s) Oral three times a day  potassium chloride  10 mEq/100 mL IVPB 10 milliEquivalent(s) IV Intermittent every 1 hour    MEDICATIONS  (PRN):  acetaminophen     Tablet .. 650 milliGRAM(s) Oral every 6 hours PRN Temp greater or equal to 38C (100.4F), Mild Pain (1 - 3)  ALBUTerol    0.083% 2.5 milliGRAM(s) Nebulizer every 8 hours PRN Shortness of Breath and/or Wheezing  ondansetron Injectable 4 milliGRAM(s) IV Push every 6 hours PRN Nausea and/or Vomiting    PRESENT SYMPTOMS: [ ]Unable to obtain due to poor mentation   Source if other than patient:  [ ]Family   [ ]Team     Pain: [ ]yes [ ]no  QOL impact -   Location -                    Aggravating factors -  Quality -  Radiation -  Timing-  Severity (0-10 scale):  Minimal acceptable level (0-10 scale):     CPOT:    https://www.[facility].org/getattachment/[email or web address]/Critical-Care-Pain-Observation-Tool-(CPOT)      PAIN AD Score:     [email or web address]/painad.pdf (press ctrl +  left click to view)    Dyspnea:                           [ ]Mild [ ]Moderate [ ]Severe  Anxiety:                             [ ]Mild [ ]Moderate [ ]Severe  Fatigue:                             [ ]Mild [ ]Moderate [ ]Severe  Nausea:                             [ ]Mild [ ]Moderate [ ]Severe  Loss of appetite:              [ ]Mild [ ]Moderate [ ]Severe  Constipation:                    [ ]Mild [ ]Moderate [ ]Severe    Other Symptoms:  [ ]All other review of systems negative     Palliative Performance Status Version 2:         %    http://npcrc.org/files/news/palliative_performance_scale_ppsv2.pdf  PHYSICAL EXAM:  Vital Signs Last 24 Hrs  T(C): 36.3 (14 Jul 2022 08:15), Max: 37.9 (13 Jul 2022 16:00)  T(F): 97.3 (14 Jul 2022 08:15), Max: 100.2 (13 Jul 2022 16:00)  HR: 84 (14 Jul 2022 11:00) (78 - 103)  BP: 106/62 (14 Jul 2022 11:00) (84/69 - 153/68)  BP(mean): 71 (14 Jul 2022 11:00) (66 - 120)  RR: 30 (14 Jul 2022 11:00) (16 - 50)  SpO2: 100% (14 Jul 2022 11:00) (96% - 100%)    Parameters below as of 14 Jul 2022 11:00  Patient On (Oxygen Delivery Method): nasal cannula  O2 Flow (L/min): 1   I&O's Summary    13 Jul 2022 07:01  -  14 Jul 2022 07:00  --------------------------------------------------------  IN: 0 mL / OUT: 1450 mL / NET: -1450 mL      GENERAL:  [ ]Alert  [ ]Oriented x   [ ]Lethargic  [ ]Cachexia  [ ]Unarousable  [ ]Verbal  [ ]Non-Verbal  Behavioral:   [ ] Anxiety  [ ] Delirium [ ] Agitation [ ] Other  HEENT:  [ ]Normal   [ ]Dry mouth   [ ]ET Tube/Trach  [ ]Oral lesions  PULMONARY:   [ ]Clear [ ]Tachypnea  [ ]Audible excessive secretions   [ ]Rhonchi        [ ]Right [ ]Left [ ]Bilateral  [ ]Crackles        [ ]Right [ ]Left [ ]Bilateral  [ ]Wheezing     [ ]Right [ ]Left [ ]Bilateral  [ ]Diminished breath sounds [ ]right [ ]left [ ]bilateral  CARDIOVASCULAR:    [ ]Regular [ ]Irregular [ ]Tachy  [ ]Carlitos [ ]Murmur [ ]Other  GASTROINTESTINAL:  [ ]Soft  [ ]Distended   [ ]+BS  [ ]Non tender [ ]Tender  [ ]PEG [ ]OGT/ NGT  Last BM:   GENITOURINARY:  [ ]Normal [ ] Incontinent   [ ]Oliguria/Anuria   [ ]Lay  MUSCULOSKELETAL:   [ ]Normal   [ ]Weakness  [ ]Bed/Wheelchair bound [ ]Edema  NEUROLOGIC:   [ ]No focal deficits  [ ]Cognitive impairment  [ ]Dysphagia [ ]Dysarthria [ ]Paresis [ ]Other   SKIN:   [ ]Normal    [ ]Rash  [ ]Pressure ulcer(s)       Present on admission [ ]y [ ]n    CRITICAL CARE:  [ ] Shock Present  [ ]Septic [ ]Cardiogenic [ ]Neurologic [ ]Hypovolemic  [ ]  Vasopressors [ ]  Inotropes   [ ]Respiratory failure present [ ]Mechanical ventilation [ ]Non-invasive ventilatory support [ ]High flow    [ ]Acute  [ ]Chronic [ ]Hypoxic  [ ]Hypercarbic [ ]Other  [ ]Other organ failure     LABS:                        11.4   9.64  )-----------( 187      ( 14 Jul 2022 06:00 )             34.4   07-14    141  |  103  |  11  ----------------------------<  99  2.9<LL>   |  29  |  0.83    Ca    9.3      14 Jul 2022 06:00  Mg     1.9     07-14    TPro  6.3  /  Alb  2.4<L>  /  TBili  1.4<H>  /  DBili  0.3  /  AST  15  /  ALT  12  /  AlkPhos  88  07-14  PT/INR - ( 14 Jul 2022 06:00 )   PT: 15.4 sec;   INR: 1.30 ratio               RADIOLOGY & ADDITIONAL STUDIES: reviewed    PROTEIN CALORIE MALNUTRITION PRESENT: [ ]mild [ ]moderate [ ]severe [ ]underweight [ ]morbid obesity  https://www.andeal.org/vault/2440/web/files/ONC/Table_Clinical%20Characteristics%20to%20Document%20Malnutrition-White%20JV%20et%20al%202012.pdf    Height (cm): 167.6 (07-09-22 @ 15:09)  Weight (kg): 72.6 (07-09-22 @ 15:09)  BMI (kg/m2): 25.8 (07-09-22 @ 15:09)    [x ]PPSV2 < or = to 30% [ ]significant weight loss  [ ]poor nutritional intake  [ ]anasarca      [ ]Artificial Nutrition      REFERRALS:   [ ]Chaplaincy  [ x]Hospice  [ ]Child Life  [ ]Social Work  [ ]Case management [ ]Holistic Therapy     Goals of Care Document:  HPI:  This is an 88 y/o F with PMH of HTN, Pre-DM, Dyslipidemia, AS, Parkinson's Disease, Alzheimer Dementia, Anxiety, and Depression who was sent from her assisted living facility for respiratory distress & AMS, was reported to have thick secretions in her throat with dropping of her SPO2 to the low 70's, improved with suctioning & oxygen supplementation to upper 80's & low 90's, no reported fever. Patient was tested positive for COVID-19 about 2 weeks ago. At the ED her CTA showed no PE to the level of lobes, but showed evidence suggestive of aspiration PNA. Patient is non verbal, no further history could be obtained at this time.  (09 Jul 2022 22:39)    PERTINENT PM/SXH:   Cholesterolosis    Alzheimer&#x27;s dementia without behavioral disturbance, unspecified timing of dementia onset    Depression, unspecified depression type    Parkinsonism, unspecified Parkinsonism type    Falls frequently      No significant past surgical history      FAMILY HISTORY:  No pertinent family history in first degree relatives      ITEMS NOT CHECKED ARE NOT PRESENT    SOCIAL HISTORY:   Significant other/partner[ ]  Children[ x]  Nondenominational/Spirituality:  Substance hx:  [ ]   Tobacco hx:  [ ]   Alcohol hx: [ ]   (x) none  Home Opioid hx:  [ ] I-Stop Reference No:  Living Situation: [x ]Home  [ ]Long term care  [ ]Rehab [ ]Other    ADVANCE DIRECTIVES:    DNR  MOLST  [ x]  Living Will  [ ]   DECISION MAKER(s):  [ x] Health Care Proxy(s)  [ ] Surrogate(s)  [ ] Guardian           Name(s): Phone Number(s):    BASELINE (I)ADL(s) (prior to admission):  [place]otton: [ ]Total  [ x] Moderate [ ]Dependent    Allergies    No Known Allergies    Intolerances    MEDICATIONS  (STANDING):  carbidopa/levodopa  25/100 1 Tablet(s) Oral three times a day  potassium chloride  10 mEq/100 mL IVPB 10 milliEquivalent(s) IV Intermittent every 1 hour    MEDICATIONS  (PRN):  acetaminophen     Tablet .. 650 milliGRAM(s) Oral every 6 hours PRN Temp greater or equal to 38C (100.4F), Mild Pain (1 - 3)  ALBUTerol    0.083% 2.5 milliGRAM(s) Nebulizer every 8 hours PRN Shortness of Breath and/or Wheezing  ondansetron Injectable 4 milliGRAM(s) IV Push every 6 hours PRN Nausea and/or Vomiting    PRESENT SYMPTOMS: [ x]Unable to obtain due to poor mentation   Source if other than patient:  [ ]Family   [ ]Team     Pain: [ ]yes [ ]no  QOL impact -   Location -                    Aggravating factors -  Quality -  Radiation -  Timing-  Severity (0-10 scale):  Minimal acceptable level (0-10 scale):     CPOT:    https://www.[facility].org/getattachment/[email or web address]/Critical-Care-Pain-Observation-Tool-(CPOT)      PAIN AD Score:     http://geriatrictoolkit.[email or web address]/painad.pdf (press ctrl +  left click to view)    Dyspnea:                           [ ]Mild [ ]Moderate [ ]Severe  Anxiety:                             [ ]Mild [ ]Moderate [ ]Severe  Fatigue:                             [ ]Mild [ ]Moderate [ ]Severe  Nausea:                             [ ]Mild [ ]Moderate [ ]Severe  Loss of appetite:              [ ]Mild [ ]Moderate [ ]Severe  Constipation:                    [ ]Mild [ ]Moderate [ ]Severe    Other Symptoms:  [ ]All other review of systems negative     Palliative Performance Status Version 2:         %    http://npcrc.org/files/news/palliative_performance_scale_ppsv2.pdf  PHYSICAL EXAM:  Vital Signs Last 24 Hrs  T(C): 36.3 (14 Jul 2022 08:15), Max: 37.9 (13 Jul 2022 16:00)  T(F): 97.3 (14 Jul 2022 08:15), Max: 100.2 (13 Jul 2022 16:00)  HR: 84 (14 Jul 2022 11:00) (78 - 103)  BP: 106/62 (14 Jul 2022 11:00) (84/69 - 153/68)  BP(mean): 71 (14 Jul 2022 11:00) (66 - 120)  RR: 30 (14 Jul 2022 11:00) (16 - 50)  SpO2: 100% (14 Jul 2022 11:00) (96% - 100%)    Parameters below as of 14 Jul 2022 11:00  Patient On (Oxygen Delivery Method): nasal cannula  O2 Flow (L/min): 1   I&O's Summary    13 Jul 2022 07:01  -  14 Jul 2022 07:00  --------------------------------------------------------  IN: 0 mL / OUT: 1450 mL / NET: -1450 mL    GENERAL:  well-groomed, frail, mildly tachypneic  HEENT:  Atraumatic, Normocephalic, EOMI, PERRLA, conjunctiva and sclera clear, anicteric  ENMT: Moist mucous membranes,   NECK: Supple, No JVD, Normal thyroid  NERVOUS SYSTEM:  lethargic, non focal, occasionally opens eyes  CHEST/LUNG: BS decreased at bases  HEART: Regular rate and rhythm; No murmurs, rubs, or gallops  ABDOMEN: Soft, Nontender, Nondistended; Bowel sounds present  EXTREMITIES:  2+ Peripheral Pulses, No clubbing, cyanosis, or edema    LABS:                        11.4   9.64  )-----------( 187      ( 14 Jul 2022 06:00 )             34.4   07-14    141  |  103  |  11  ----------------------------<  99  2.9<LL>   |  29  |  0.83    Ca    9.3      14 Jul 2022 06:00  Mg     1.9     07-14    TPro  6.3  /  Alb  2.4<L>  /  TBili  1.4<H>  /  DBili  0.3  /  AST  15  /  ALT  12  /  AlkPhos  88  07-14  PT/INR - ( 14 Jul 2022 06:00 )   PT: 15.4 sec;   INR: 1.30 ratio               RADIOLOGY & ADDITIONAL STUDIES: reviewed    PROTEIN CALORIE MALNUTRITION PRESENT: [ ]mild [ ]moderate [ ]severe [ ]underweight [ ]morbid obesity  https://www.andeal.org/vault/0490/web/files/ONC/Table_Clinical%20Characteristics%20to%20Document%20Malnutrition-White%20JV%20et%20al%202012.pdf    Height (cm): 167.6 (07-09-22 @ 15:09)  Weight (kg): 72.6 (07-09-22 @ 15:09)  BMI (kg/m2): 25.8 (07-09-22 @ 15:09)    [x ]PPSV2 < or = to 30% [ ]significant weight loss  [ ]poor nutritional intake  [ ]anasarca      [ ]Artificial Nutrition      REFERRALS:   [ ]Chaplaincy  [ x]Hospice  [ ]Child Life  [ ]Social Work  [ ]Case management [ ]Holistic Therapy     Goals of Care Document:

## 2022-07-14 NOTE — PROGRESS NOTE ADULT - ASSESSMENT
86 y/o F with PMH of HTN, Pre-DM, Dyslipidemia, AS, Parkinson's Disease, Alzheimer Dementia, Anxiety, and Depression who was sent from her assisted living facility for respiratory distress & AMS,    AMS  resp distress  Atelectasis  PNA  Parkinson's Disease  Ataxic gait  OP  OA  HLD  HTN  Dementia  Depression  COVID    hospice discussion under way  cm follow up reviewed  remains on ABX  vs noted  labs reviewed    emp ABX  ID eval  COVID isolation  cough rx regimen  HOB elev - asp prec - oral hygiene  assist with all NEEDS  GOC discussion - DNR  Ct chest - neg for PE - although a limited study  fio2 support and titration - keep sat > 88 pct  monitor VS and HD and Sat  prognosis guarded  medical supp regimen in progress   pt is a resident of retirement
86 y/o F with PMH of HTN, Pre-DM, Dyslipidemia, AS, Parkinson's Disease, Alzheimer Dementia, Anxiety, and Depression who was sent from her assisted living facility for respiratory distress & AMS,    AMS  resp distress  Atelectasis  PNA  Parkinson's Disease  Ataxic gait  OP  OA  HLD  HTN  Dementia  Depression  COVID    on ABX  on IVF  on o2 support  VS noted  labs reviewed  remains on Isolation  MBS noted -     emp ABX  ID eval  COVID isolation  cough rx regimen  HOB elev - asp prec - oral hygiene  assist with all NEEDS  GOC discussion - DNR  Ct chest - neg for PE - although a limited study  fio2 support and titration - keep sat > 88 pct  monitor VS and HD and Sat  prognosis guarded  medical supp regimen in progress   pt is a [facility]  
88 y/o F with PMH of HTN, Pre-DM, Dyslipidemia, AS, Parkinson's Disease, Alzheimer Dementia, Anxiety, and Depression who was sent from her assisted living facility for respiratory distress & AMS,    AMS  resp distress  Atelectasis  PNA  Parkinson's Disease  Ataxic gait  OP  OA  HLD  HTN  Dementia  Depression  COVID    on ABX  on IVF  on o2 support  VS noted  labs reviewed  remains on Isolation    emp ABX  ID eval  COVID isolation  cough rx regimen  HOB elev - asp prec - oral hygiene  assist with all NEEDS  GOC discussion - DNR  Ct chest - neg for PE - although a limited study  fio2 support and titration - keep sat > 88 pct  monitor VS and HD and Sat  prognosis guarded  medical supp regimen in progress   pt is a resident of [facility]  
This is an 86 y/o F with PMH of HTN, Pre-DM, Dyslipidemia, AS, Parkinson's Disease, Alzheimer Dementia, Anxiety, and Depression admitted for    1. Aspiration PNA  2. COVID 19, 2 weeks ago  3. Acute Hypoxic Respiratory Failure, improving  4. UTI  5. Sepsis, improving    Neuro:  - Avoid Neuro Deliriogenic / sedative medications  - Aspiration Precautions, HOB > 30 degrees  - Continue Dementia redimen    CV:  - No active issues, Normotensive  - Continue current antihypertensive regimen  - asa/ Statin    Pulm:  - Continue Steroids for enhanced anti-inflammatory effect  - Supplemental oxygen as needed to maintain SpO2 > 92%,  - Incentive spirometry                  GI:  - Pureed Mildly thick    Renal:  - Even to net negative fluid balance as tolerated by hemodynamics and renal fx.    - Preserved Renal Function Continue to monitor Bun/Cr and UOP  - Replacing electrolytes as needed with Goal K> 4, PO> 3, Mg> 2               - Strict I&O's  - Avoid Nephro toxic medication    Heme:  - Lovenox    ID:  - Zosyn s/p Ezra  - Microbiology and Radiology reviewed   - trend CBC with diff, CMP  and fever curve    Endo:  - ISS for aggressive glycemic control to limit FS glucose to < 180mg/dl.    COVID 19 specific considerations and therapeutic  options based on the available and rapidly changing literature  
88 y/o F with PMH of HTN, Pre-DM, Dyslipidemia, AS, Parkinson's Disease, Alzheimer Dementia, Anxiety, and Depression who was sent from her assisted living facility for respiratory distress & AMS,    AMS  resp distress  Atelectasis  PNA  Parkinson's Disease  Ataxic gait  OP  OA  HLD  HTN  Dementia  Depression  COVID    Blood cx noted - on ABX - ID follow up - final Cx and Sx pending  VS noted  remains on o2 support    emp ABX  ID eval  COVID isolation  cough rx regimen  HOB elev - asp prec - oral hygiene  assist with all NEEDS  GOC discussion - DNR  Ct chest - neg for PE - although a limited study  fio2 support and titration - keep sat > 88 pct  monitor VS and HD and Sat  prognosis guarded  medical supp regimen in progress   pt is a resident of [facility]
87F Parkinson's, HTN, HLD and Dementia admitted for Acute Hypoxic Respiratory Failure     Acute Hypoxic Respiratory Failure   Suspect its from Aspiration Pneumonia; Recently diagnosed with COVID (2 weeks ago) and still testing positive   Imaging suggestive of B/L Infiltrates   Completed Remdesivir; Continue Decadron and Zosyn   Evaluation for Aspiration with Barium Swallow   Currently on 4L NC     HTN / HLD   Controlled; Amlodipine + Statin    Parkinson's Disease / Dementia   Aricept + Namenda   Sinemet     Diet  Pureed    DVT Prophylaxis   Lovenox    Disposition  Discharge planning pending hospital course 
87F Parkinson's, HTN, HLD and Dementia admitted for Acute Hypoxic Respiratory Failure     Acute Hypoxic Respiratory Failure   Suspect its from Aspiration Pneumonia; Recently diagnosed with COVID (2 weeks ago) and still testing positive   Imaging suggestive of B/L Infiltrates   Completed Remdesivir; Continue Decadron and Zosyn   Currently on 4L NC   palliative care being discussed      HTN / HLD   Controlled; Amlodipine + Statin    Parkinson's Disease / Dementia   Aricept + Namenda   Sinemet     Diet  Pureed    DVT Prophylaxis   Lovenox    Disposition  Discharge planning pending hospital course     hospice care being discussed, palliative care consult called   d/w Daughter Chey [facility] [phone]
86 y/o F with PMH of HTN, Pre-DM, Dyslipidemia, AS, Parkinson's Disease, Alzheimer Dementia, Anxiety, and Depression presented with respiratory distress & AMS,

## 2022-07-14 NOTE — DISCHARGE NOTE NURSING/CASE MANAGEMENT/SOCIAL WORK - NSDCPEFALRISK_GEN_ALL_CORE
For information on Fall & Injury Prevention, visit: [email or web address] OR  [email or web address] OR  https://www.cdc.gov/steadi/patient.html

## 2022-07-14 NOTE — GOALS OF CARE CONVERSATION - ADVANCED CARE PLANNING - TREATMENT GUIDELINES
DNI/DNR Order/Comfort measures only/Do not re-hospitalize/No blood draws/No artificial nutrition/No antibiotics/No IV fluids

## 2022-07-14 NOTE — PROVIDER CONTACT NOTE (CRITICAL VALUE NOTIFICATION) - PERSON GIVING RESULT:
yoni-lab
charlie [facility]
linda partida
sharonda lab
Denise from lab

## 2022-07-14 NOTE — DISCHARGE NOTE PROVIDER - NSDCCPCAREPLAN_GEN_ALL_CORE_FT
PRINCIPAL DISCHARGE DIAGNOSIS  Diagnosis: Pneumonia, aspiration  Assessment and Plan of Treatment: hospice care

## 2022-07-14 NOTE — CONSULT NOTE ADULT - ASSESSMENT
#Acute hypoxic resp failure 2/2 asp PNA  recent COVID  comfort measures only    #SOB/pain  start morphine    #Goals of care/advance care planning  - Palliative performance scale score: 10% (terminal)  - Prognosis: hours-days (pt is hospice eligible)   - Code status: DNR/DNI (MOLST form filled out and placed in the chart)  - GOC: comfort measures. D/c to inpatient hospice pending bed availability.    - HCP:   - Psychosocial support provided  - SW to start referral to hospice    Appreciate consult. Discussed with the primary team.    Justina Blanchard MD, [facility]-C   #Acute hypoxic resp failure 2/2 asp PNA  #recent COVID  - comfort measures only    #SOB/pain  - start morphine    #Parkinson's Disease / Dementia   - d/c po meds    #Goals of care/advance care planning  - Palliative performance scale score: 10% (terminal)  - Prognosis: hours-days (pt is hospice eligible)   - Code status: DNR/DNI (MOLST form filled out and placed in the chart)  - GOC: comfort measures. D/c to inpatient hospice pending bed availability.    - Surrogate: lenore Guerrier  - Psychosocial support provided  - SW to start referral to hospice    Appreciate consult. Discussed with the primary team.    Justina Blanchard MD, [facility]

## 2022-07-14 NOTE — DISCHARGE NOTE NURSING/CASE MANAGEMENT/SOCIAL WORK - PATIENT PORTAL LINK FT
You can access the FollowMyHealth Patient Portal offered by [facility] by registering at the following website: [email or web address]. By joining [email or web address] FollowMyHealth portal, you will also be able to view your health information using other applications (apps) compatible with our system.

## 2022-07-14 NOTE — DISCHARGE NOTE PROVIDER - HOSPITAL COURSE
87F Parkinson's, HTN, HLD and Dementia admitted for Acute Hypoxic Respiratory Failure     Acute Hypoxic Respiratory Failure   Suspect its from Aspiration Pneumonia; Recently diagnosed with COVID (2 weeks ago) and still testing positive   Imaging suggestive of B/L Infiltrates   Completed Remdesivir; Continue Decadron and Zosyn   Currently on 4L NC   palliative care being discussed  HTN / HLD   Controlled; Amlodipine + Statin  Parkinson's Disease / Dementia   Aricept + Namenda   Sinemet   Diet  Pureed      Disposition  to hospice In   d/w Daughter Chey [facility] [phone]

## 2022-07-14 NOTE — GOALS OF CARE CONVERSATION - ADVANCED CARE PLANNING - CONVERSATION DETAILS
Writer spoke  with.dtr/ HCP  Chey Green. Reviewed patient's medical and social history as well as events leading to patient's hospitalization. Writer discussed patient's current diagnosis (Asp.PNA, Parkinsons. Alzheimers), medical condition and management, prognosis, and life expectancy. Inquired about patient's wishes regarding extent of medical care to be provided including escalation of medical care into the ICU and use of vasopressor support. In addition, the writer inquired about thoughts regarding cardiopulmonary resuscitation, artificial nutrition and hydration including use of feeding tubes and IVF, antibiotics, and further investigative studies such as blood draws and radiology.  Chey  showed good  insight into medical condition. All questions answered. Writer recommended hospice inn evaluation . Chey consented to DNR/DNI, comfort measures only, no FT, no ABX,no fluids. MOLST form filled out and placed in chart. Psychosocial support provided. JOSE LUIS barroso will place referral to INN.

## 2022-07-15 LAB
CULTURE RESULTS: SIGNIFICANT CHANGE UP
SPECIMEN SOURCE: SIGNIFICANT CHANGE UP

## 2022-07-17 LAB
CULTURE RESULTS: SIGNIFICANT CHANGE UP
SPECIMEN SOURCE: SIGNIFICANT CHANGE UP

## 2022-07-19 ENCOUNTER — NON-APPOINTMENT (OUTPATIENT)
Age: 87
End: 2022-07-19

## 2023-01-22 NOTE — SWALLOW VFSS/MBS ASSESSMENT ADULT - LARYNGEAL PENETRATION DURING THE SWALLOW - SILENT
FAMILY HISTORY:  No pertinent family history in first degree relatives     with complete retrieval/Mild Moderate

## 2023-03-15 NOTE — SWALLOW VFSS/MBS ASSESSMENT ADULT - SLP GENERAL OBSERVATIONS
[Dear  ___] : Dear  [unfilled], Pt received sitting upright in MBS chair, +lateral view.  Pt was awake, on supplemental O2 via 1L NC. Pt was confused/anxious requiring max cues of encouragement for participation. Pt demonstrated difficulty following low level directives. Nonverbal pain scale 0/10. [Consult Letter:] : I had the pleasure of evaluating your patient, [unfilled]. [Please see my note below.] : Please see my note below. [Consult Closing:] : Thank you very much for allowing me to participate in the care of this patient.  If you have any questions, please do not hesitate to contact me. [Sincerely,] : Sincerely, [FreeTextEntry3] : Jett Mackey MD FACS

## 2023-07-08 NOTE — PROGRESS NOTE ADULT - SUBJECTIVE AND OBJECTIVE BOX
Return to the emergency department if your child experiences worsening abdominal pain, fevers, night sweats, intractable vomiting, testicular pain or if you have any other concerns. Patient is a 85y old  Female who presents with a chief complaint of blunt trauma, fall from standing, r femoral neck fx (05 Oct 2020 16:48)      BRIEF HOSPITAL COURSE:   86 yo f pmhx HTN, HLD, Alzheimers dementia, severe AS, nonobstructive CAD, PVD admitted s/p mechanical fall at home and sustained right femoral neck fracture.      10/5: went to cath lab for aortic balloon valvuloplasty, intraop patient sustained wide complex tachycardia with worsening aortic insuff and was monitored in MICU.    Events last 24 hours:   No events overnight. Urine appears cloudy, urine cx sent, started on Rocephin.       PAST MEDICAL & SURGICAL HISTORY:  Falls frequently  Parkinsonism, unspecified Parkinsonism type  Depression, unspecified depression type  Alzheimer&#x27;s dementia without behavioral disturbance, unspecified timing of dementia onset  Cholesterolosis  No significant past surgical history      Allergies  No Known Allergies      FAMILY HISTORY:  Unknown      Social History:   From home      Review of Systems:  "I'm ready to go home" No other complaints overnight.       Vitals During Exam:   HR: 67  BP: 129/93 mmHg   RR: 18  sPO2: 96% on RA    Physical Examination:    General: Elderly, pleasant female, lying in bed, NAD      HEENT: NC/AT, Pupils equal, reactive to light.  Symmetric.    PULM: Symmetrical thorax expansion upon respiration.  Clear to auscultation bilaterally, no significant sputum production    CVS: Regular rate and rhythm, no murmurs, rubs, or gallops appreciated    ABD: Soft, nondistended, nontender, normoactive bowel sounds, no masses appreciated    EXT: Nonpitting edema, nontender    SKIN: Warm and well perfused, no rashes noted.    NEURO: Alert, oriented to person and hospital setting, interactive      Medications:  cefTRIAXone   IVPB 1000 milliGRAM(s) IV Intermittent every 24 hours  amLODIPine   Tablet 5 milliGRAM(s) Oral daily  losartan 50 milliGRAM(s) Oral daily  acetaminophen   Tablet .. 650 milliGRAM(s) Oral every 6 hours PRN  carbidopa/levodopa  25/100 1 Tablet(s) Oral three times a day  donepezil 5 milliGRAM(s) Oral daily  donepezil 5 milliGRAM(s) Oral at bedtime  melatonin 3 milliGRAM(s) Oral at bedtime  memantine 10 milliGRAM(s) Oral two times a day  sertraline 100 milliGRAM(s) Oral daily  heparin   Injectable 5000 Unit(s) SubCutaneous every 12 hours  atorvastatin 20 milliGRAM(s) Oral at bedtime  cholecalciferol 2000 Unit(s) Oral every 48 hours  potassium chloride  10 mEq/100 mL IVPB 10 milliEquivalent(s) IV Intermittent every 1 hour  chlorhexidine 2% Cloths 1 Application(s) Topical <User Schedule>      ICU Vital Signs Last 24 Hrs  T(C): 36.6 (06 Oct 2020 07:18), Max: 37.3 (05 Oct 2020 17:55)  T(F): 97.8 (06 Oct 2020 07:18), Max: 99.1 (05 Oct 2020 17:55)  HR: 67 (06 Oct 2020 07:00) (64 - 80)  BP: 129/93 (06 Oct 2020 07:00) (115/56 - 180/70)  BP(mean): 106 (06 Oct 2020 07:00) (71 - 107)  ABP: --  ABP(mean): --  RR: 18 (06 Oct 2020 07:00) (15 - 57)  SpO2: 96% (06 Oct 2020 07:00) (92% - 99%)    Vital Signs Last 24 Hrs  T(C): 36.6 (06 Oct 2020 07:18), Max: 37.3 (05 Oct 2020 17:55)  T(F): 97.8 (06 Oct 2020 07:18), Max: 99.1 (05 Oct 2020 17:55)  HR: 67 (06 Oct 2020 07:00) (64 - 80)  BP: 129/93 (06 Oct 2020 07:00) (115/56 - 180/70)  BP(mean): 106 (06 Oct 2020 07:00) (71 - 107)  RR: 18 (06 Oct 2020 07:00) (15 - 57)  SpO2: 96% (06 Oct 2020 07:00) (92% - 99%)        I&O's Detail    05 Oct 2020 07:01  -  06 Oct 2020 07:00  --------------------------------------------------------  IN:    Lactated Ringers: 360 mL    Oral Fluid: 120 mL  Total IN: 480 mL    OUT:    Voided (mL): 315 mL  Total OUT: 315 mL  Total NET: 165 mL      LABS:                        12.1   6.86  )-----------( 148      ( 06 Oct 2020 05:50 )             37.0     10-    141  |  104  |  18.0  ----------------------------<  102<H>  4.1   |  23.0  |  0.76    Ca    9.7      06 Oct 2020 05:50  Phos  3.0     10  Mg     2.5     10-06    TPro  6.6  /  Alb  3.8  /  TBili  1.8  /  DBili  x   /  AST  16  /  ALT  7   /  AlkPhos  119  10-06      Urinalysis Basic - ( 04 Oct 2020 10:13 )  Color: Yellow / Appearance: Slightly Turbid / S.010 / pH: x  Gluc: x / Ketone: Trace  / Bili: Negative / Urobili: 4 mg/dL   Blood: x / Protein: 30 mg/dL / Nitrite: Negative   Leuk Esterase: Moderate / RBC: 3-5 /HPF / WBC >50   Sq Epi: x / Non Sq Epi: Occasional / Bacteria: Many      CULTURES:  Pending      RADIOLOGY:   < from: CT Pelvis Bony Only No Cont (10.02.20 @ 17:36) >   EXAM:  CT PELVIS BONY ONLY                         EXAM:  CT 3D RECONSTRUCT W PEYTON                          PROCEDURE DATE:  10/02/2020      INTERPRETATION:  HISTORY: Right femoral neck fracture.    Helical CT imaging of the bony pelvis was performed without intravenous contrast. Sagittal and coronal reformats were provided. 3-D reformats were performed on a separate workstation.    Correlation is made with prior radiographs from 2020.    Findings:    There is an acute subcapital right femoral neck fracture with impaction of fracture fragments with apex anterior angulation. There is no dislocation. There is surrounding soft tissue edema about the right hip.    There is chronic appearing angulation at the sacrococcygeal junction likely related to remote trauma. There is mild bilateral hip arthrosis. There is moderate pubic symphysis arthrosis. Sacroiliac joints are preserved. There is lower lumbar spondylosis.    There is atherosclerotic disease. Cystic structures are seen within the ovaries bilaterally measuring 4.3 x 2.3 cm on the left and 3.1 x 2 cm on the right. Further evaluation with pelvic ultrasound is recommended. There is colonic diverticulosis.    Impression:    Acute impacted right subcapital femoral neck fracture.    Nonspecific cystic lesions within the bilateral ovaries adrenal to 4.3 cm for which further evaluation with pelvic ultrasound is recommended.    Findings discussed with Dr. Ira SANTOYO M.D., ATTENDING RADIOLOGIST  This document has been electronically signed. Oct  2 2020  5:57PM    < end of copied text >      SUPPLEMENTAL O2: RA  LINES: Peripheral   IVF: N  KAY: Y  PPx: PPI, Heparin  CONTACT: Patient is a 85y old  Female who presents with a chief complaint of blunt trauma, fall from standing, r femoral neck fx (05 Oct 2020 16:48)      BRIEF HOSPITAL COURSE:   84 yo f pmhx HTN, HLD, Alzheimers dementia, severe AS, nonobstructive CAD, PVD admitted s/p mechanical fall at home and sustained right femoral neck fracture.      10/5: went to cath lab for aortic balloon valvuloplasty, intraop patient sustained wide complex tachycardia with worsening aortic insuff and was monitored in MICU.    Events last 24 hours:   No events overnight. Urine appears cloudy, urine cx sent, started on Rocephin. TTE pending for today.       PAST MEDICAL & SURGICAL HISTORY:  Falls frequently  Parkinsonism, unspecified Parkinsonism type  Depression, unspecified depression type  Alzheimer&#x27;s dementia without behavioral disturbance, unspecified timing of dementia onset  Cholesterolosis  No significant past surgical history      Allergies  No Known Allergies      FAMILY HISTORY:  Unknown      Social History:   From home      Review of Systems:  "I'm ready to go home" No other complaints overnight.       Vitals During Exam:   HR: 67  BP: 129/93 mmHg   RR: 18  sPO2: 96% on RA    Physical Examination:    General: Elderly, pleasant female, lying in bed, NAD      HEENT: NC/AT, Pupils equal, reactive to light.  Symmetric.    PULM: Symmetrical thorax expansion upon respiration.  Clear to auscultation bilaterally, no significant sputum production    CVS: Regular rate and rhythm, no murmurs, rubs, or gallops appreciated    ABD: Soft, nondistended, nontender, normoactive bowel sounds, no masses appreciated    EXT: Nonpitting edema, nontender    SKIN: Warm and well perfused, no rashes noted.    NEURO: Alert, oriented to person and hospital setting, interactive      Medications:  cefTRIAXone   IVPB 1000 milliGRAM(s) IV Intermittent every 24 hours  amLODIPine   Tablet 5 milliGRAM(s) Oral daily  losartan 50 milliGRAM(s) Oral daily  acetaminophen   Tablet .. 650 milliGRAM(s) Oral every 6 hours PRN  carbidopa/levodopa  25/100 1 Tablet(s) Oral three times a day  donepezil 5 milliGRAM(s) Oral daily  donepezil 5 milliGRAM(s) Oral at bedtime  melatonin 3 milliGRAM(s) Oral at bedtime  memantine 10 milliGRAM(s) Oral two times a day  sertraline 100 milliGRAM(s) Oral daily  heparin   Injectable 5000 Unit(s) SubCutaneous every 12 hours  atorvastatin 20 milliGRAM(s) Oral at bedtime  cholecalciferol 2000 Unit(s) Oral every 48 hours  potassium chloride  10 mEq/100 mL IVPB 10 milliEquivalent(s) IV Intermittent every 1 hour  chlorhexidine 2% Cloths 1 Application(s) Topical <User Schedule>      ICU Vital Signs Last 24 Hrs  T(C): 36.6 (06 Oct 2020 07:18), Max: 37.3 (05 Oct 2020 17:55)  T(F): 97.8 (06 Oct 2020 07:18), Max: 99.1 (05 Oct 2020 17:55)  HR: 67 (06 Oct 2020 07:00) (64 - 80)  BP: 129/93 (06 Oct 2020 07:00) (115/56 - 180/70)  BP(mean): 106 (06 Oct 2020 07:00) (71 - 107)  ABP: --  ABP(mean): --  RR: 18 (06 Oct 2020 07:00) (15 - 57)  SpO2: 96% (06 Oct 2020 07:00) (92% - 99%)    Vital Signs Last 24 Hrs  T(C): 36.6 (06 Oct 2020 07:18), Max: 37.3 (05 Oct 2020 17:55)  T(F): 97.8 (06 Oct 2020 07:18), Max: 99.1 (05 Oct 2020 17:55)  HR: 67 (06 Oct 2020 07:00) (64 - 80)  BP: 129/93 (06 Oct 2020 07:00) (115/56 - 180/70)  BP(mean): 106 (06 Oct 2020 07:00) (71 - 107)  RR: 18 (06 Oct 2020 07:00) (15 - 57)  SpO2: 96% (06 Oct 2020 07:00) (92% - 99%)        I&O's Detail    05 Oct 2020 07:01  -  06 Oct 2020 07:00  --------------------------------------------------------  IN:    Lactated Ringers: 360 mL    Oral Fluid: 120 mL  Total IN: 480 mL    OUT:    Voided (mL): 315 mL  Total OUT: 315 mL  Total NET: 165 mL      LABS:                        12.1   6.86  )-----------( 148      ( 06 Oct 2020 05:50 )             37.0     10-    141  |  104  |  18.0  ----------------------------<  102<H>  4.1   |  23.0  |  0.76    Ca    9.7      06 Oct 2020 05:50  Phos  3.0     10  Mg     2.5     10-06    TPro  6.6  /  Alb  3.8  /  TBili  1.8  /  DBili  x   /  AST  16  /  ALT  7   /  AlkPhos  119  10-06      Urinalysis Basic - ( 04 Oct 2020 10:13 )  Color: Yellow / Appearance: Slightly Turbid / S.010 / pH: x  Gluc: x / Ketone: Trace  / Bili: Negative / Urobili: 4 mg/dL   Blood: x / Protein: 30 mg/dL / Nitrite: Negative   Leuk Esterase: Moderate / RBC: 3-5 /HPF / WBC >50   Sq Epi: x / Non Sq Epi: Occasional / Bacteria: Many      CULTURES:  Pending      RADIOLOGY:   < from: CT Pelvis Bony Only No Cont (10.02.20 @ 17:36) >   EXAM:  CT PELVIS BONY ONLY                         EXAM:  CT 3D RECONSTRUCT W PEYTON                          PROCEDURE DATE:  10/02/2020      INTERPRETATION:  HISTORY: Right femoral neck fracture.    Helical CT imaging of the bony pelvis was performed without intravenous contrast. Sagittal and coronal reformats were provided. 3-D reformats were performed on a separate workstation.    Correlation is made with prior radiographs from 2020.    Findings:    There is an acute subcapital right femoral neck fracture with impaction of fracture fragments with apex anterior angulation. There is no dislocation. There is surrounding soft tissue edema about the right hip.    There is chronic appearing angulation at the sacrococcygeal junction likely related to remote trauma. There is mild bilateral hip arthrosis. There is moderate pubic symphysis arthrosis. Sacroiliac joints are preserved. There is lower lumbar spondylosis.    There is atherosclerotic disease. Cystic structures are seen within the ovaries bilaterally measuring 4.3 x 2.3 cm on the left and 3.1 x 2 cm on the right. Further evaluation with pelvic ultrasound is recommended. There is colonic diverticulosis.    Impression:    Acute impacted right subcapital femoral neck fracture.    Nonspecific cystic lesions within the bilateral ovaries adrenal to 4.3 cm for which further evaluation with pelvic ultrasound is recommended.    Findings discussed with Dr. Ira SANTOYO M.D., ATTENDING RADIOLOGIST  This document has been electronically signed. Oct  2 2020  5:57PM    < end of copied text >      SUPPLEMENTAL O2: RA  LINES: Peripheral   IVF: N  KAY: Y  PPx: PPI, Heparin  CONTACT:

## 2023-07-25 NOTE — PROGRESS NOTE ADULT - PROVIDER SPECIALTY LIST ADULT
Cardiology How Severe Is Your Acne?: moderate Is This A New Presentation, Or A Follow-Up?: Acne Additional Comments (Use Complete Sentences): Currently using oil free acne wash

## 2024-07-08 NOTE — ED PROVIDER NOTE - ATTENDING APP SHARED VISIT CONTRIBUTION OF CARE
ASSESSMENT/PLAN: Favor allergic contact dermatitis to a topical product (likely neosporin) vs. other OTC creams.    - Start clobetasol 0.05% ointment BID to affected area on BODY for up to 2 weeks at a time, 1 week break before resuming as needed.  - If pruritic rash develops on face, start hydrocortisone 2.5% ointment BID to affected areas on face for up to 2 weeks at a time. Avoid direct contact with eyes    Thank you for this consult. Patient was seen and reviewed with attending dermatologist Dr. Guo    Please page [phone] with a [phone] call-back number for further related questions.    Ana Churchill MD  Resident Physician, PGY-3  [facility]   Pager: [phone]
I performed a history and physical exam of the patient and discussed their management with the advanced care provider. I reviewed the advanced care provider's note and agree with the documented findings and plan of care. My medical decision making and objective findings are found above.